# Patient Record
Sex: FEMALE | Race: WHITE | NOT HISPANIC OR LATINO | Employment: OTHER | URBAN - METROPOLITAN AREA
[De-identification: names, ages, dates, MRNs, and addresses within clinical notes are randomized per-mention and may not be internally consistent; named-entity substitution may affect disease eponyms.]

---

## 2017-11-24 ENCOUNTER — HOSPITAL ENCOUNTER (EMERGENCY)
Facility: HOSPITAL | Age: 37
End: 2017-11-24
Attending: EMERGENCY MEDICINE | Admitting: EMERGENCY MEDICINE
Payer: MEDICARE

## 2017-11-24 ENCOUNTER — HOSPITAL ENCOUNTER (INPATIENT)
Facility: HOSPITAL | Age: 37
LOS: 21 days | Discharge: HOME | DRG: 885 | End: 2017-12-15
Attending: PSYCHIATRY & NEUROLOGY | Admitting: PSYCHIATRY & NEUROLOGY
Payer: MEDICARE

## 2017-11-24 VITALS
SYSTOLIC BLOOD PRESSURE: 153 MMHG | HEART RATE: 101 BPM | DIASTOLIC BLOOD PRESSURE: 81 MMHG | OXYGEN SATURATION: 100 % | TEMPERATURE: 98.2 F | RESPIRATION RATE: 16 BRPM

## 2017-11-24 DIAGNOSIS — F41.9 ANXIETY: ICD-10-CM

## 2017-11-24 DIAGNOSIS — L98.9 DERMATOSIS: ICD-10-CM

## 2017-11-24 DIAGNOSIS — Z72.0 TOBACCO USE: ICD-10-CM

## 2017-11-24 DIAGNOSIS — F31.64 BIPOLAR I DISORDER, MOST RECENT EPISODE MIXED, SEVERE WITH PSYCHOTIC FEATURES (HCC): Primary | Chronic | ICD-10-CM

## 2017-11-24 DIAGNOSIS — E63.9 NUTRITIONAL DEFICIENCY: ICD-10-CM

## 2017-11-24 DIAGNOSIS — N39.0 UTI (URINARY TRACT INFECTION): ICD-10-CM

## 2017-11-24 DIAGNOSIS — K59.00 CONSTIPATION: ICD-10-CM

## 2017-11-24 DIAGNOSIS — R44.0 AUDITORY HALLUCINATIONS: ICD-10-CM

## 2017-11-24 DIAGNOSIS — F12.10 CANNABIS ABUSE: Chronic | ICD-10-CM

## 2017-11-24 DIAGNOSIS — R45.851 SUICIDAL IDEATIONS: Primary | ICD-10-CM

## 2017-11-24 DIAGNOSIS — L60.3: ICD-10-CM

## 2017-11-24 DIAGNOSIS — L85.3 XEROSIS OF SKIN: ICD-10-CM

## 2017-11-24 LAB — ETHANOL EXG-MCNC: 0 MG/DL

## 2017-11-24 PROCEDURE — 99284 EMERGENCY DEPT VISIT MOD MDM: CPT

## 2017-11-24 PROCEDURE — 82075 ASSAY OF BREATH ETHANOL: CPT | Performed by: EMERGENCY MEDICINE

## 2017-11-24 RX ORDER — HALOPERIDOL 5 MG/ML
10 INJECTION INTRAMUSCULAR ONCE
Status: DISCONTINUED | OUTPATIENT
Start: 2017-11-24 | End: 2017-11-24

## 2017-11-24 RX ORDER — HALOPERIDOL 5 MG/ML
5 INJECTION INTRAMUSCULAR EVERY 6 HOURS PRN
Status: CANCELLED | OUTPATIENT
Start: 2017-11-24

## 2017-11-24 RX ORDER — CARBAMAZEPINE 100 MG/5ML
SUSPENSION ORAL 4 TIMES DAILY
COMMUNITY
End: 2017-12-15 | Stop reason: HOSPADM

## 2017-11-24 RX ORDER — HALOPERIDOL 5 MG
5 TABLET ORAL EVERY 6 HOURS PRN
Status: CANCELLED | OUTPATIENT
Start: 2017-11-24

## 2017-11-24 RX ORDER — MAGNESIUM HYDROXIDE/ALUMINUM HYDROXICE/SIMETHICONE 120; 1200; 1200 MG/30ML; MG/30ML; MG/30ML
30 SUSPENSION ORAL EVERY 4 HOURS PRN
Status: CANCELLED | OUTPATIENT
Start: 2017-11-24

## 2017-11-24 RX ORDER — LORAZEPAM 1 MG/1
1 TABLET ORAL ONCE
Status: COMPLETED | OUTPATIENT
Start: 2017-11-24 | End: 2017-11-24

## 2017-11-24 RX ORDER — BENZTROPINE MESYLATE 1 MG/ML
1 INJECTION INTRAMUSCULAR; INTRAVENOUS
Status: CANCELLED | OUTPATIENT
Start: 2017-11-24

## 2017-11-24 RX ORDER — LORAZEPAM 2 MG/ML
2 INJECTION INTRAMUSCULAR ONCE
Status: DISCONTINUED | OUTPATIENT
Start: 2017-11-24 | End: 2017-11-24

## 2017-11-24 RX ORDER — OLANZAPINE 10 MG/1
10 TABLET, ORALLY DISINTEGRATING ORAL ONCE
Status: COMPLETED | OUTPATIENT
Start: 2017-11-24 | End: 2017-11-24

## 2017-11-24 RX ORDER — ACETAMINOPHEN 325 MG/1
650 TABLET ORAL EVERY 6 HOURS PRN
Status: CANCELLED | OUTPATIENT
Start: 2017-11-24

## 2017-11-24 RX ADMIN — LORAZEPAM 1 MG: 1 TABLET ORAL at 21:34

## 2017-11-24 RX ADMIN — OLANZAPINE 10 MG: 10 TABLET, ORALLY DISINTEGRATING ORAL at 21:34

## 2017-11-24 NOTE — ED NOTES
Pt was observed to attempt to leave the [address] [place] [address] area  She then walked into the corner of another empty room where she remained staring at the wall  She was approached and asked to remain in her room unless she needed to discuss something specific  Pt remains upset about not being able to urinate since she believes she does not have genitals      GINNY Quintana  11/24/17   3500
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
## 2017-11-24 NOTE — ED NOTES
Pt freely admits to having both SI and HI  Pt states she wishes her grandmother were dead and want to overdose her with medications  Pt states she already gives her grandmother pills at night to make her go to sleep so she doesn't have to deal with her  Pt also states that she has SI and has thougths of dissappearing, but walking off and "never to be found" also by overdosing on mouth wash       Thanh Avery RN  11/24/17 1557

## 2017-11-24 NOTE — ED NOTES
Received report from [facility], continual observation to be continued  Pt has been pacing around the [facility] unit and is now staring out the [facility] doors        Hetal Alvarado  11/24/17 9041

## 2017-11-24 NOTE — ED NOTES
CW spoke to patient in room  Patient seated on bed  Patient was cooperative  Patient requested water from [email or web address]  Patient was delivered water by SHAILA  CATALINO [facility]  CW exited [facility]  Will continue to monitor       Brittni Castro  11/24/17 9510

## 2017-11-24 NOTE — ED NOTES
Pt took sheet off of bed and is switching between sitting in the chair and laying in bed       Ramon Alcala  11/24/17 1846

## 2017-11-24 NOTE — ED NOTES
Patient given dietary tray  Tray is finger food diet only  Patient is seated on bed eating food  Will continue to monitor       Clarence Lozano  11/24/17 1625

## 2017-11-24 NOTE — LETTER
[address] [facility] [address]  [facility] [address]  Dept: [phone]                EMTALA TRANSFER CONSENT    NAME Cordelia Celestin                                1980                              MRN [id number]    I have been informed of my rights regarding examination, treatment, and transfer   by Dr Henrietta Chapin MD    Benefits: Other benefits (Include comment)_______________________ (inpt MH tx)    Risks: Potential deterioration of medical condition      Consent for Transfer:  I acknowledge that my medical condition has been evaluated and explained to me by the emergency department physician or other qualified medical person and/or my attending physician, who has recommended that I be transferred to the service of  Accepting Physician: Dr Adama Gusman at [address] [place] [address] Name, Clem[address]fedward [address] : Jose Juan Gibbs [phone]  The above potential benefits of such transfer, the potential risks associated with such transfer, and the probable risks of not being transferred have been explained to me, and I fully understand them  The doctor has explained that, in my case, the benefits of transfer outweigh the risks  I agree to be transferred  I authorize the performance of emergency medical procedures and treatments upon me in both transit and upon arrival at the receiving facility  Additionally, I authorize the release of any and all medical records to the receiving facility and request they be transported with me, if possible  I understand that the safest mode of transportation during a medical emergency is an ambulance and that the Hospital advocates the use of this mode of transport  Risks of traveling to the receiving facility by car, including absence of medical control, life sustaining equipment, such as oxygen, and medical personnel has been explained to me and I fully understand them      (ANNE CORRECT BOX BELOW)  [  Cecile Benites  I consent to the stated transfer and to be transported by ambulance/helicopter  [  ]  I consent to the stated transfer, but refuse transportation by ambulance and accept full responsibility for my transportation by car  I understand the risks of non-ambulance transfers and I exonerate the Hospital and its staff from any deterioration in my condition that results from this refusal     X___________________________________________    DATE  17  TIME________  Signature of patient or legally responsible individual signing on patient behalf           RELATIONSHIP TO PATIENT_________________________                          Provider Certification    NAME Kiet Elkins                                 1980                              MRN [id number]    A medical screening exam was performed on the above named patient  Based on the examination:    Condition Necessitating Transfer Suicidal Ideations, Psychosis     Patient Condition: The patient has been stabilized such that within reasonable medical probability, no material deterioration of the patient condition or the condition of the unborn child(hayden) is likely to result from the transfer    Reason for Transfer: Level of Care needed not available at this facility    Transfer Requirements: [facility] [address] [phone]   · Space available and qualified personnel available for treatment as acknowledged by Luma Linder  · Agreed to accept transfer and to provide appropriate medical treatment as acknowledged by       Dr Kp Marsh  · Appropriate medical records of the examination and treatment of the patient are provided at the time of transfer   [address] [facility], [address] __DC_____  · Transfer will be performed by qualified personnel from [facility] [phone]  and appropriate transfer equipment as required, including the use of necessary and appropriate life support measures      Provider Certification: I have examined the patient and explained the following risks and benefits of being transferred/refusing transfer to the patient/family:  General risk, such as traffic hazards, adverse weather conditions, rough terrain or turbulence, possible failure of equipment (including vehicle or aircraft), or consequences of actions of persons outside the control of the transport personnel      Based on these reasonable risks and benefits to the patient and/or the unborn child(hayden), and based upon the information available at the time of the patients examination, I certify that the medical benefits reasonably to be expected from the provision of appropriate medical treatments at another medical facility outweigh the increasing risks, if any, to the individuals medical condition, and in the case of labor to the unborn child, from effecting the transfer      X____________________________________________ DATE 11/24/17        TIME_______      ORIGINAL - SEND TO MEDICAL RECORDS   COPY - SEND WITH PATIENT DURING TRANSFER

## 2017-11-24 NOTE — ED NOTES
Spoke with Kristen Bishop at [facility], reports available beds  201 faxed over for review       Luma Linder, GINNY  11/24/17   9636

## 2017-11-24 NOTE — ED NOTES
Pt changed into paper scrubs per protocol  Pt's belongings inventoried and placed in bags in locker  Pt calm and cooperative  Constant watch observation initiated       Jett Herbert  11/24/17 5530

## 2017-11-24 NOTE — ED NOTES
Patient [facility] in [facility] at [facility] time  Full patient report given to Pallavi Love to continue to monitor        Woo Ritter  11/24/17 1545

## 2017-11-24 NOTE — ED NOTES
Patient pacing in [facility]  Dr Arianna Gill entered [facility] to talk to patient  Patient appears cooperative, but continues to pace  Dr Arianna Gill exits [facility]  Will continue to monitor        Kristal Herbert  11/24/17 1523

## 2017-11-24 NOTE — ED NOTES
Pt kept pacing around the unit after being told multiple times to stay in her room, but she had gone to the double doors and was staring out the window  She had pushed on the door and it had opened with no alarm going off, she was stopped at the doors before exiting the [address] [place] [address] unit  She was easily redirected back into the unit  Security was notified and the doors are now locked        Alli Charles  11/24/17 7231

## 2017-11-24 NOTE — ED NOTES
Registration entered secured holding three, patient seated and chair and cooperative  Registration exited  Will continue to monitor       [address] [facility]  11/24/17 4195

## 2017-11-24 NOTE — ED NOTES
Pt is laying in bed, eyes closed, respirations steady and unlabored  Will continue to monitor        Micaela Hernandez  11/24/17 8628

## 2017-11-24 NOTE — ED NOTES
Patient requested cup of water  Patient was given cup of water and then proceeded to pour down the sink and crush cup  Patient is now pacing in and out of doorway of room  Will continue to monitor       Jasmina Bell  11/24/17 6660

## 2017-11-25 ENCOUNTER — APPOINTMENT (INPATIENT)
Dept: RADIOLOGY | Facility: HOSPITAL | Age: 37
DRG: 885 | End: 2017-11-25
Payer: MEDICARE

## 2017-11-25 PROBLEM — F31.89 ATYPICAL BIPOLAR AFFECTIVE DISORDER (HCC): Status: ACTIVE | Noted: 2017-11-25

## 2017-11-25 LAB
ALBUMIN SERPL BCP-MCNC: 3.1 G/DL (ref 3.5–5)
ALP SERPL-CCNC: 59 U/L (ref 46–116)
ALT SERPL W P-5'-P-CCNC: 22 U/L (ref 12–78)
AMPHETAMINES SERPL QL SCN: NEGATIVE
ANION GAP SERPL CALCULATED.3IONS-SCNC: 5 MMOL/L (ref 4–13)
APAP SERPL-MCNC: <2 UG/ML (ref 10–30)
AST SERPL W P-5'-P-CCNC: 13 U/L (ref 5–45)
BARBITURATES UR QL: NEGATIVE
BASOPHILS # BLD AUTO: 0.01 THOUSANDS/ΜL (ref 0–0.1)
BASOPHILS NFR BLD AUTO: 0 % (ref 0–1)
BENZODIAZ UR QL: NEGATIVE
BILIRUB SERPL-MCNC: 0.33 MG/DL (ref 0.2–1)
BUN SERPL-MCNC: 9 MG/DL (ref 5–25)
CALCIUM SERPL-MCNC: 8.4 MG/DL (ref 8.3–10.1)
CHLORIDE SERPL-SCNC: 108 MMOL/L (ref 100–108)
CO2 SERPL-SCNC: 29 MMOL/L (ref 21–32)
COCAINE UR QL: NEGATIVE
CREAT SERPL-MCNC: 0.68 MG/DL (ref 0.6–1.3)
EOSINOPHIL # BLD AUTO: 0 THOUSAND/ΜL (ref 0–0.61)
EOSINOPHIL NFR BLD AUTO: 0 % (ref 0–6)
ERYTHROCYTE [DISTWIDTH] IN BLOOD BY AUTOMATED COUNT: 14.1 % (ref 11.6–15.1)
ETHANOL SERPL-MCNC: <3 MG/DL (ref 0–3)
GFR SERPL CREATININE-BSD FRML MDRD: 113 ML/MIN/1.73SQ M
GLUCOSE P FAST SERPL-MCNC: 99 MG/DL (ref 65–99)
GLUCOSE SERPL-MCNC: 96 MG/DL (ref 65–140)
GLUCOSE SERPL-MCNC: 99 MG/DL (ref 65–140)
HCT VFR BLD AUTO: 37.6 % (ref 34.8–46.1)
HGB BLD-MCNC: 12.3 G/DL (ref 11.5–15.4)
LYMPHOCYTES # BLD AUTO: 2.36 THOUSANDS/ΜL (ref 0.6–4.47)
LYMPHOCYTES NFR BLD AUTO: 23 % (ref 14–44)
MCH RBC QN AUTO: 29.7 PG (ref 26.8–34.3)
MCHC RBC AUTO-ENTMCNC: 32.7 G/DL (ref 31.4–37.4)
MCV RBC AUTO: 91 FL (ref 82–98)
METHADONE UR QL: NEGATIVE
MONOCYTES # BLD AUTO: 0.65 THOUSAND/ΜL (ref 0.17–1.22)
MONOCYTES NFR BLD AUTO: 6 % (ref 4–12)
NEUTROPHILS # BLD AUTO: 7.42 THOUSANDS/ΜL (ref 1.85–7.62)
NEUTS SEG NFR BLD AUTO: 71 % (ref 43–75)
NRBC BLD AUTO-RTO: 0 /100 WBCS
OPIATES UR QL SCN: NEGATIVE
PCP UR QL: NEGATIVE
PLATELET # BLD AUTO: 251 THOUSANDS/UL (ref 149–390)
PMV BLD AUTO: 10.7 FL (ref 8.9–12.7)
POTASSIUM SERPL-SCNC: 3.9 MMOL/L (ref 3.5–5.3)
PROT SERPL-MCNC: 6 G/DL (ref 6.4–8.2)
RBC # BLD AUTO: 4.14 MILLION/UL (ref 3.81–5.12)
SALICYLATES SERPL-MCNC: <3 MG/DL (ref 3–20)
SODIUM SERPL-SCNC: 142 MMOL/L (ref 136–145)
THC UR QL: NEGATIVE
WBC # BLD AUTO: 10.46 THOUSAND/UL (ref 4.31–10.16)

## 2017-11-25 PROCEDURE — 70450 CT HEAD/BRAIN W/O DYE: CPT

## 2017-11-25 PROCEDURE — 82948 REAGENT STRIP/BLOOD GLUCOSE: CPT

## 2017-11-25 PROCEDURE — 80329 ANALGESICS NON-OPIOID 1 OR 2: CPT | Performed by: EMERGENCY MEDICINE

## 2017-11-25 PROCEDURE — 80320 DRUG SCREEN QUANTALCOHOLS: CPT | Performed by: EMERGENCY MEDICINE

## 2017-11-25 PROCEDURE — 80307 DRUG TEST PRSMV CHEM ANLYZR: CPT | Performed by: EMERGENCY MEDICINE

## 2017-11-25 PROCEDURE — 85025 COMPLETE CBC W/AUTO DIFF WBC: CPT | Performed by: EMERGENCY MEDICINE

## 2017-11-25 PROCEDURE — 80053 COMPREHEN METABOLIC PANEL: CPT | Performed by: EMERGENCY MEDICINE

## 2017-11-25 RX ORDER — MAGNESIUM HYDROXIDE/ALUMINUM HYDROXICE/SIMETHICONE 120; 1200; 1200 MG/30ML; MG/30ML; MG/30ML
30 SUSPENSION ORAL EVERY 4 HOURS PRN
Status: DISCONTINUED | OUTPATIENT
Start: 2017-11-25 | End: 2017-12-15 | Stop reason: HOSPADM

## 2017-11-25 RX ORDER — NALOXONE HYDROCHLORIDE 1 MG/ML
INJECTION INTRAMUSCULAR; INTRAVENOUS; SUBCUTANEOUS
Status: DISCONTINUED
Start: 2017-11-25 | End: 2017-11-25 | Stop reason: WASHOUT

## 2017-11-25 RX ORDER — HALOPERIDOL 5 MG/ML
5 INJECTION INTRAMUSCULAR EVERY 6 HOURS PRN
Status: DISCONTINUED | OUTPATIENT
Start: 2017-11-25 | End: 2017-11-28

## 2017-11-25 RX ORDER — ACETAMINOPHEN 325 MG/1
650 TABLET ORAL EVERY 6 HOURS PRN
Status: DISCONTINUED | OUTPATIENT
Start: 2017-11-25 | End: 2017-12-05

## 2017-11-25 RX ORDER — NALOXONE HYDROCHLORIDE 1 MG/ML
1 INJECTION INTRAMUSCULAR; INTRAVENOUS; SUBCUTANEOUS ONCE
Status: COMPLETED | OUTPATIENT
Start: 2017-11-25 | End: 2017-11-25

## 2017-11-25 RX ORDER — NALOXONE HYDROCHLORIDE 1 MG/ML
INJECTION INTRAMUSCULAR; INTRAVENOUS; SUBCUTANEOUS
Status: COMPLETED
Start: 2017-11-25 | End: 2017-11-25

## 2017-11-25 RX ORDER — BENZTROPINE MESYLATE 1 MG/ML
1 INJECTION INTRAMUSCULAR; INTRAVENOUS
Status: DISCONTINUED | OUTPATIENT
Start: 2017-11-25 | End: 2017-11-28

## 2017-11-25 RX ORDER — HALOPERIDOL 5 MG
5 TABLET ORAL EVERY 6 HOURS PRN
Status: DISCONTINUED | OUTPATIENT
Start: 2017-11-25 | End: 2017-11-28

## 2017-11-25 RX ADMIN — NALOXONE HYDROCHLORIDE 1 MG: 1 INJECTION INTRAMUSCULAR; INTRAVENOUS; SUBCUTANEOUS at 01:08

## 2017-11-25 RX ADMIN — NALOXONE HYDROCHLORIDE 1 MG: 1 INJECTION PARENTERAL at 01:08

## 2017-11-25 NOTE — ED NOTES
[facility]TS arrived early, [place] at [facility] made aware of ETA       Agusto Quijano, GINNY  11/24/17   0704
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
## 2017-11-25 NOTE — ED NOTES
Pt came out of room stating that she drank the entire bottle of water already  She was asking for more water  Pt was given another cup of water       Tala Rabago  11/24/17 2014
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
## 2017-11-25 NOTE — CONSULTS
H&P Exam - Trauma   Rudi Arellano 40 y o  female MRN: [id number]  Unit/Bed#: FB8 670-14 Encounter: [id number]    Assessment/Plan   Trauma Alert: Inpatient trauma consultation after fall  Model of Arrival: Ambulance  Trauma Team: Attending Inocente Guzman and Nedra Dockery  Consultants: None    Trauma Active Problems:   1  Unwitnessed fall  2  Altered mental status  3  Adverse medication effect (sedated)    Trauma Plan:   1  Baseline labs/UDS/POC Glucose   2  Narcan  3  CT head  4  Continual observation  5  If negative CTH, reevaluate patient when clinically sober, hold sedating medications until results of tests and pt has returned to baseline  6  Fall precautions/Bed alarm    Chief Complaint: altered mental status      History of Present Illness   HPI:  Rudi Arellano is a 40 y o  female who is an inpatient behavioral health consult after unwitnessed fall  Per staff, pt was recently transferred from [facility] for SI/HI/psychosis, tech heard loud noise and found patient on the floor next to the bed with (+) urinary incontinence  According to notes from [facility] ED pt was a poor historian at previous facility, was given Zyprexa and Ativan prior to arrival  Based on outpatient records pt is normally a good historian, unsure if pt took elicit drugs prior to arrival to ED  Pt at time of examination, is somnolent, altered with a GCS of 11  Pt initially told staff she did not strike her head but at time of examination stated she did  Pt denied any pain at time of exam, scattered bruising noted to all extremities, no spinal tenderness, chest/abdominal tenderness  History limited secondary to altered state      Mechanism:Fall      Past Medical History:   Diagnosis Date    Bipolar I disorder, most recent episode depressed, moderate (HCC)    Depressive disorder, not elsewhere classified     Past Surgical History:   plastic surgery on ear, earring came out   D and E   foot, skin graft     Family History   Problem Relation Age of Onset    Hypertension Mother    Heart Disorder Mother   heart murmur    Cancer Paternal Grandmother   skin     Social History    Marital status: Single   Spouse name: N/A    Number of children: 0    Years of education: 12+   Occupational History    [employer]   Social History Main Topics    Smoking status: Former Smoker   Packs/day: 0 40   Years: 2 00   Types: Cigarettes, Cigars   Quit date: 9/14/2016    Smokeless tobacco: Never Used   Comment: pt quit about 2 days ago    Alcohol use No   Comment: socially    Drug use: No    Sexual activity: Yes   Partners: Male   Comment: multiple partners; unprotected sex while drunk     Review of patient's allergies indicates: Allergen Reactions    Allopurinol    Depakote [Valproic Acid And Related]    Risperdal [Risperidone]       Review of Systems   Unable to perform ROS: Mental status change   Genitourinary:        Urinary incontinence   Musculoskeletal: Positive for gait problem (gait instability)  Skin: Positive for wound (scattered bruising to all extremities)  Psychiatric/Behavioral: Positive for confusion and hallucinations  Historical Information   History is unobtainable from the patient due to sedation/AMS  Efforts to obtain history included the following sources: other medical personnel, obtained from other records    Past Medical History:   Diagnosis Date    Bipolar disorder ([facility] [address] )     Depression      No past surgical history on file  Social History   History   Alcohol use Not on file     History   Drug Use    Frequency: 1 0 time per week    Types: Methamphetamines     History   Smoking Status    Unknown If Ever Smoked   Smokeless Tobacco    Not on file       There is no immunization history on file for this patient    Last Tetanus: unknown  Family History: Non-contributory      Meds/Allergies   all current active meds have been reviewed    Allergies   Allergen Reactions    Lithium          PHYSICAL EXAM    PE limited by: patient cooperation    Objective   Vitals:   First set: Temperature: 97 8 °F (36 6 °C) (11/24/17 2249)  Pulse: 104 (11/24/17 2249)  Respirations: 16 (11/24/17 2249)  Blood Pressure: 131/65 (11/24/17 2249)    Primary Survey:   (A) Airway: intact  (B) Breathing: BBS CTA, no W/R/R  (C) Circulation: Pulses:   pedal  2/4 and radial  2/4  (D) Disabliity:  GCS Total:  11, Eye Opening: To pain = 2, Motor Response: Localizes pain = 5 and Verbal Response:  Confused = 4  (E) Expose:  Completed    Secondary Survey:   Physical Exam   Constitutional: She appears well-developed and well-nourished  Mild distress secondary to somnolent/altered mental status   HENT:   Head: Normocephalic and atraumatic  Nose: Nose normal    Mouth/Throat: Oropharynx is clear and moist  No oropharyngeal exudate  Eyes: Conjunctivae and EOM are normal  Pupils are equal, round, and reactive to light  Right eye exhibits no discharge  Left eye exhibits no discharge  Neck: Normal range of motion  Neck supple  No JVD present  No tracheal deviation present  No c-spine tenderness   Cardiovascular: Regular rhythm, normal heart sounds and intact distal pulses  Exam reveals no gallop and no friction rub  No murmur heard  tachycardic   Pulmonary/Chest: Effort normal and breath sounds normal  No stridor  No respiratory distress  She has no wheezes  She has no rales  She exhibits no tenderness  Abdominal: Soft  Bowel sounds are normal  She exhibits no distension  There is no tenderness  There is no rebound and no guarding  Genitourinary:   Genitourinary Comments: No genital trauma noted   Musculoskeletal: Normal range of motion  She exhibits no edema, tenderness or deformity  Neurological:   Drowsy, GCS 11, pt falling asleep during exam (difficulty with answering questions or following commands)   Skin: She is not diaphoretic  Scattered ecchymosis to extremities   Nursing note and vitals reviewed        Invasive Devices          No matching active lines, drains, or airways          Lab Results:   BMP/CMP:   Lab Results   Component Value Date     11/25/2017    K 3 9 11/25/2017     11/25/2017    CO2 29 11/25/2017    ANIONGAP 5 11/25/2017    BUN 9 11/25/2017    CREATININE 0 68 11/25/2017    GLUCOSE 99 11/25/2017    CALCIUM 8 4 11/25/2017    AST 13 11/25/2017    ALT 22 11/25/2017    ALKPHOS 59 11/25/2017    PROT 6 0 (L) 11/25/2017    ALBUMIN 3 1 (L) 11/25/2017    BILITOT 0 33 11/25/2017    EGFR 113 11/25/2017   , CBC:   Lab Results   Component Value Date    WBC 10 46 (H) 11/25/2017    HGB 12 3 11/25/2017    HCT 37 6 11/25/2017    MCV 91 11/25/2017     11/25/2017    MCH 29 7 11/25/2017    MCHC 32 7 11/25/2017    RDW 14 1 11/25/2017    MPV 10 7 11/25/2017    NRBC 0 11/25/2017   , AST:   Lab Results   Component Value Date    AST 13 11/25/2017   , ALT:   Lab Results   Component Value Date    ALT 22 11/25/2017   UDS: pending  Imaging/EKG Studies:   11/25/17 CT head: No acute intracranial pathology        Code Status: No Order  Advance Directive and Living Will:      Power of :    POLST:

## 2017-11-25 NOTE — ED NOTES
1 to 1 taken over  Pt appears to be asleep on her bed  No signs of distress  Will continue to monitor       Armando Rabago  11/24/17 9172

## 2017-11-25 NOTE — PLAN OF CARE
Alteration in Thoughts and Perception     Treatment Goal: Gain control of psychotic behaviors/thinking, reduce/eliminate presenting symptoms and demonstrate improved reality functioning upon discharge Progressing     Verbalize thoughts and feelings Progressing     Refrain from acting on delusional thinking/internal stimuli Progressing     Agree to be compliant with medication regime, as prescribed and report medication side effects Progressing     Recognize dysfunctional thoughts, communicate reality-based thoughts at the time of discharge Progressing     Complete daily ADLs, including personal hygiene independently, as able Progressing

## 2017-11-25 NOTE — ED NOTES
Pt allowed me to obtain her vitals  After I walked out she stepped out of her room, placed her empty urine cup on the counter near the sink and grabbed her food from her tray that was sitting next to the sink  Pt is now standing in her doorway staring out  Will continue to monitor       Bre Sharifa Rabago  11/24/17 9630

## 2017-11-25 NOTE — ED NOTES
Call placed to [facility] to check on status of COB request, spoke with Jc Faye who stated Austin Sherman will not be placed on same day; due to weekend return call will most likely be received on Monday 11/27       GINNY Null  11/24/17   9591

## 2017-11-25 NOTE — PROGRESS NOTES
Patient demanding to be given "something" because "I was given Lithium and I'm allergic to it " "Take this band of me - pointing to her allergy band - it's too tight, I can't breathe " Patient breathing perfectly normally, plastic allergy band was not tight but was cut off anyway  Explained to patient she was given a small dose of Narcan at 1 08 am because her behavior was causing suspicion she may have used drugs before she got here, and was potentially the reason she was falling around and was disorganized  She was told she was not given any other drug and at this point was not going to receive any medication

## 2017-11-25 NOTE — PROGRESS NOTES
MHT that was doing rounding heard a "thump" from pt's room  Pt was noted to be on her knees and getting up from  The floor  Pt was incontinent of a large amount of urine  Pt was disorganized and sedated  Gait was unsteady  Staff assisted pt to bathroom to assist in ADLs

## 2017-11-25 NOTE — ED NOTES
Pt came out of room and asked what time breakfast was served  I advised her not until 7 am   Pt then asked what time it was, informed that it was approx  8 pm   Pt asked for snacks  Pt was provided a water and a bag lunch which included a sandwich, pretzels and animal crackers  Pt brought bag lunch to her bed and sat it on the mattress  Pt continues to stand and stare out the doorway of her room       Verona Rabago  11/24/17 2010

## 2017-11-25 NOTE — ED NOTES
Pt has been accepted to [facility] by Nurse Ebonie Boggs and Dr Montesinos Held      Report needs to be called to [facility] at [phone]

## 2017-11-25 NOTE — ED NOTES
Call placed to [facility], pt has [address] [place] [address] coverage as secondary insurance  Call placed to [address] [place] [address], spoke with Fabiano Garnett to begin COB  Awaiting call back  Call placed to [facility] spoke with Rosas Burnette who set up transport for 2300      GINNY Engle  11/24/17   1964
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
## 2017-11-25 NOTE — TREATMENT PLAN
TREATMENT PLAN REVIEW - [address] 40 y o  1980 female MRN: [id number]    [address] [facility] Room / Bed: [place] [address] Encounter: [id number]          Admit Date/Time:  11/24/2017 10:43 PM    Treatment Team: Attending Provider: Judit Chavez MD; Patient Care Technician: Sabas Keenan; Patient Care Technician: Roxann Nagy; Patient Care Technician: Julito Lazcano; Nursing Student: Agus Najera;  Registered Nurse: Zhane Mauricio RN; Patient Care Technician: Harjeet Mccrary    Diagnosis: Principal Problem:    Atypical bipolar affective disorder [facility])      Patient Strengths/Assets: cooperative, communication skills    Patient Barriers/Limitations: limited education, limited motivation, low self esteem, noncompliant with medication, noncompliant with treatment, poor insight, poor interpersonal skills    Short Term Goals: decrease in depressive symptoms, decrease in paranoid thoughts, decrease in psychotic symptoms, decrease in suicidal thoughts    Long Term Goals: free of suicidal thoughts, resolution of psychotic symptoms, improved insight    Progress Towards Goals: continue psychiatric medications as prescribed    Recommended Treatment: medication management, patient medication education, group therapy, milieu therapy, continued Behavioral Health psychiatric evaluation/assessment process    Treatment Frequency: daily medication monitoring, group and milieu therapy daily, monitoring through interdisciplinary rounds, monitoring through weekly patient care conferences    Expected Discharge Date:  12/05/17    Discharge Plan: discharge to home    Treatment Plan Created/Updated By: Aurelio Henriquez MD

## 2017-11-25 NOTE — ED NOTES
Call received from [id number],  of [facility]  [id number] inquired about pt's status; this worker apologized for not reaching out and explained pt denied current services  [id number] reports that pt gave birth a month ago, and prior to that was stable on medications  Pt had a recent psychiatry appointment, but is unsure the turn out of that and reports that pt may have lied about taking medications since she has been breast feeding  [address] confirmed pt lives with her parents, is in active treatment and has not been presenting the way described at this time  ACT is agreeable to pt's inpatient hospitalization at this time as she was informed of current psychosis  [id number] made aware of ETA and placement  Please update ACT as needed: [id number] [phone]       GINNY Guy  11/24/17 2037

## 2017-11-25 NOTE — ED NOTES
Crisis worker at bedside speaking with pt  Pt signing paperwork for transport       Bonnie Rabago  11/24/17 2033

## 2017-11-25 NOTE — PROGRESS NOTES
More alert,    Pt understands that she is at the hospital   She is orientated x 4  Gait is becoming more steady  Bed alarm is on

## 2017-11-25 NOTE — ED NOTES
Spoke with Kasie Clemons at [facility], pt accepted to [facility] by Dr Letitia Huitron      Nurse to Nurse Report to be completed [address] [facility]  11/24/17   1950

## 2017-11-25 NOTE — ED NOTES
Attempted to call report to [facility]   Spoke with Ron Reid who states she will call back     Silvino Carmona RN  11/24/17 7218
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
## 2017-11-25 NOTE — ED NOTES
Pt told security SHAILA Dougherty that she saw a rat running around the [facility]ILITY unit        Arnaud Arielle  11/24/17 1903

## 2017-11-25 NOTE — PROGRESS NOTES
Narcan 1 mg IM given at 01:10  Minimal effect of same  Pt is arousable with shaking of shoulder and loud verbal stimuli, but falls right back to sleep
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
## 2017-11-25 NOTE — ED NOTES
Pt was given a bucket with personal cleaning supplies in it  She was advised that she may go into the restroom to clean up if she would like  New scrubs were given to pt as well       Bre Rabago  11/24/17 5743

## 2017-11-26 RX ORDER — CARBAMAZEPINE 100 MG/1
100 TABLET, CHEWABLE ORAL 2 TIMES DAILY
Status: DISCONTINUED | OUTPATIENT
Start: 2017-11-26 | End: 2017-11-29

## 2017-11-26 RX ADMIN — CARBAMAZEPINE 100 MG: 100 TABLET, CHEWABLE ORAL at 12:23

## 2017-11-26 RX ADMIN — CARBAMAZEPINE 100 MG: 100 TABLET, CHEWABLE ORAL at 18:47

## 2017-11-26 NOTE — PROGRESS NOTES
Progress Note - Behavioral Health   Tatiana Triplett 40 y o  female MRN: [id number]  Unit/Bed#: RJ8 139-63 Encounter: [id number]    Assessment/Plan   Principal Problem:    Atypical bipolar affective disorder ([address] )      Behavior over the last 24 hours:  unchanged  Sleep: insomnia  Appetite: normal  Medication side effects: No  ROS: no complaints    Mental Status Evaluation:  Appearance:  disheveled   Behavior:  psychomotor agitation   Speech:  soft   Mood:  dysthymic   Affect:  labile   Thought Process:  disorganized   Thought Content:  delusions  persecutory   Perceptual Disturbances: None   Risk Potential: Suicidal Ideations without plan   Sensorium:  person and place   Cognition:  grossly intact   Consciousness:  awake    Attention: attention span and concentration were age appropriate   Insight:  limited   Judgment: limited   Gait/Station: normal gait/station   Motor Activity: no abnormal movements     Progress Toward Goals: Pt cont to be quite disorganized with her mood being labile and impulsive  Staff report that she slept poorly last night though pt denies this stating that she sometime sleep walks  Limiyed insght  Recommended Treatment:  1-Tegretol 100mg bid  2- Continue with group therapy, milieu therapy and occupational therapy  Risks, benefits and possible side effects of Medications:   Risks, benefits, and possible side effects of medications explained to patient and patient verbalizes understanding        Medications:   current meds:   Current Facility-Administered Medications   Medication Dose Route Frequency    acetaminophen (TYLENOL) tablet 650 mg  650 mg Oral Q6H PRN    aluminum-magnesium hydroxide-simethicone (MYLANTA) 200-200-20 mg/5 mL oral suspension 30 mL  30 mL Oral Q4H PRN    benztropine (COGENTIN) injection 1 mg  1 mg Intramuscular Q1H PRN    carBAMazepine (TEGretol) chewable tablet 100 mg  100 mg Oral BID    haloperidol (HALDOL) tablet 5 mg  5 mg Oral Q6H PRN    haloperidol lactate (HALDOL) injection 5 mg  5 mg Intramuscular Q6H PRN    magnesium hydroxide (MILK OF MAGNESIA) 400 mg/5 mL oral suspension 30 mL  30 mL Oral Daily PRN     Labs: I have reviewed pt's labs    Counseling / Coordination of Care  Total floor / unit time spent today 25 minutes  Greater than 50% of total time was spent with the patient and / or family counseling and / or coordination of care   A description of the counseling / coordination of care:

## 2017-11-26 NOTE — PLAN OF CARE
Problem: Alteration in Thoughts and Perception  Goal: Treatment Goal: Gain control of psychotic behaviors/thinking, reduce/eliminate presenting symptoms and demonstrate improved reality functioning upon discharge  Outcome: Progressing    Goal: Verbalize thoughts and feelings  Interventions:  - Promote a nonjudgmental and trusting relationship with the patient through active listening and therapeutic communication  - Assess patient's level of functioning, behavior and potential for risk  - Engage patient in 1 on 1 interactions for a minimum of 15 minutes each session  - Encourage patient to express fears, feelings, frustrations, and discuss symptoms    - [place] patient to reality, help patient recognize reality-based thinking   - Administer medications as ordered and assess for potential side effects  - Provide the patient education related to the signs and symptoms of the illness and desired effects of prescribed medications   Outcome: Progressing    Goal: Refrain from acting on delusional thinking/internal stimuli  Interventions:  - Monitor patient closely, per order   - Utilize least restrictive measures   - Set reasonable limits, give positive feedback for acceptable   - Administer medications as ordered and monitor of potential side effects   Outcome: Progressing    Goal: Agree to be compliant with medication regime, as prescribed and report medication side effects  Interventions:  - Offer appropriate PRN medication and supervise ingestion; conduct aims, as needed    Outcome: Progressing    Goal: Recognize dysfunctional thoughts, communicate reality-based thoughts at the time of discharge  Interventions:  - Provide medication and psycho-education to assist patient in compliance and developing insight into his/her illness    Outcome: Progressing    Goal: Complete daily ADLs, including personal hygiene independently, as able  Interventions:  - Observe, teach, and assist patient with ADLS  - Monitor and promote a balance of rest/activity, with adequate nutrition and elimination    Outcome: Progressing

## 2017-11-26 NOTE — PROGRESS NOTES
Pt denies all symptoms  She appears guarded and suspicious  Pt has an odd affect, seems disorganized during brief interaction with RN  Had an inappropriate smile  All she admits to is feeling depressed  Will monitor

## 2017-11-26 NOTE — PROGRESS NOTES
Patient has been quite disorganized this evening but less of the irritable edge  She appears to be internally stimulated but becomes quite guarded when she is asked about this

## 2017-11-27 LAB
ALBUMIN SERPL BCP-MCNC: 2.8 G/DL (ref 3.5–5)
ALP SERPL-CCNC: 55 U/L (ref 46–116)
ALT SERPL W P-5'-P-CCNC: 22 U/L (ref 12–78)
ANION GAP SERPL CALCULATED.3IONS-SCNC: 6 MMOL/L (ref 4–13)
AST SERPL W P-5'-P-CCNC: 14 U/L (ref 5–45)
BASOPHILS # BLD AUTO: 0.01 THOUSANDS/ΜL (ref 0–0.1)
BASOPHILS NFR BLD AUTO: 0 % (ref 0–1)
BILIRUB SERPL-MCNC: 0.31 MG/DL (ref 0.2–1)
BUN SERPL-MCNC: 8 MG/DL (ref 5–25)
CALCIUM SERPL-MCNC: 8.5 MG/DL (ref 8.3–10.1)
CHLORIDE SERPL-SCNC: 108 MMOL/L (ref 100–108)
CHOLEST SERPL-MCNC: 146 MG/DL (ref 50–200)
CO2 SERPL-SCNC: 28 MMOL/L (ref 21–32)
CREAT SERPL-MCNC: 0.7 MG/DL (ref 0.6–1.3)
EOSINOPHIL # BLD AUTO: 0 THOUSAND/ΜL (ref 0–0.61)
EOSINOPHIL NFR BLD AUTO: 0 % (ref 0–6)
ERYTHROCYTE [DISTWIDTH] IN BLOOD BY AUTOMATED COUNT: 14 % (ref 11.6–15.1)
FOLATE SERPL-MCNC: 12.4 NG/ML (ref 3.1–17.5)
GFR SERPL CREATININE-BSD FRML MDRD: 112 ML/MIN/1.73SQ M
GLUCOSE P FAST SERPL-MCNC: 89 MG/DL (ref 65–99)
GLUCOSE SERPL-MCNC: 89 MG/DL (ref 65–140)
HCG SERPL QL: NEGATIVE
HCT VFR BLD AUTO: 36.8 % (ref 34.8–46.1)
HDLC SERPL-MCNC: 52 MG/DL (ref 40–60)
HGB BLD-MCNC: 11.9 G/DL (ref 11.5–15.4)
LDLC SERPL CALC-MCNC: 78 MG/DL (ref 0–100)
LYMPHOCYTES # BLD AUTO: 1.73 THOUSANDS/ΜL (ref 0.6–4.47)
LYMPHOCYTES NFR BLD AUTO: 24 % (ref 14–44)
MCH RBC QN AUTO: 29.2 PG (ref 26.8–34.3)
MCHC RBC AUTO-ENTMCNC: 32.3 G/DL (ref 31.4–37.4)
MCV RBC AUTO: 90 FL (ref 82–98)
MONOCYTES # BLD AUTO: 0.69 THOUSAND/ΜL (ref 0.17–1.22)
MONOCYTES NFR BLD AUTO: 9 % (ref 4–12)
NEUTROPHILS # BLD AUTO: 4.89 THOUSANDS/ΜL (ref 1.85–7.62)
NEUTS SEG NFR BLD AUTO: 67 % (ref 43–75)
NRBC BLD AUTO-RTO: 0 /100 WBCS
PLATELET # BLD AUTO: 242 THOUSANDS/UL (ref 149–390)
PMV BLD AUTO: 10.6 FL (ref 8.9–12.7)
POTASSIUM SERPL-SCNC: 4.1 MMOL/L (ref 3.5–5.3)
PROT SERPL-MCNC: 6.3 G/DL (ref 6.4–8.2)
RBC # BLD AUTO: 4.07 MILLION/UL (ref 3.81–5.12)
SODIUM SERPL-SCNC: 142 MMOL/L (ref 136–145)
T3 SERPL-MCNC: 1 NG/ML (ref 0.6–1.8)
TRIGL SERPL-MCNC: 80 MG/DL
TSH SERPL DL<=0.05 MIU/L-ACNC: 0.68 UIU/ML (ref 0.36–3.74)
VIT B12 SERPL-MCNC: 320 PG/ML (ref 100–900)
WBC # BLD AUTO: 7.33 THOUSAND/UL (ref 4.31–10.16)

## 2017-11-27 PROCEDURE — 84480 ASSAY TRIIODOTHYRONINE (T3): CPT | Performed by: PSYCHIATRY & NEUROLOGY

## 2017-11-27 PROCEDURE — 82746 ASSAY OF FOLIC ACID SERUM: CPT | Performed by: PSYCHIATRY & NEUROLOGY

## 2017-11-27 PROCEDURE — 80061 LIPID PANEL: CPT | Performed by: PSYCHIATRY & NEUROLOGY

## 2017-11-27 PROCEDURE — 84443 ASSAY THYROID STIM HORMONE: CPT | Performed by: PSYCHIATRY & NEUROLOGY

## 2017-11-27 PROCEDURE — 84703 CHORIONIC GONADOTROPIN ASSAY: CPT | Performed by: PSYCHIATRY & NEUROLOGY

## 2017-11-27 PROCEDURE — 85025 COMPLETE CBC W/AUTO DIFF WBC: CPT | Performed by: PSYCHIATRY & NEUROLOGY

## 2017-11-27 PROCEDURE — 80053 COMPREHEN METABOLIC PANEL: CPT | Performed by: PSYCHIATRY & NEUROLOGY

## 2017-11-27 PROCEDURE — 82607 VITAMIN B-12: CPT | Performed by: PSYCHIATRY & NEUROLOGY

## 2017-11-27 RX ORDER — QUETIAPINE FUMARATE 25 MG/1
50 TABLET, FILM COATED ORAL
Status: DISCONTINUED | OUTPATIENT
Start: 2017-11-27 | End: 2017-11-28

## 2017-11-27 RX ADMIN — ALUMINUM HYDROXIDE, MAGNESIUM HYDROXIDE, AND SIMETHICONE 30 ML: 200; 200; 20 SUSPENSION ORAL at 23:59

## 2017-11-27 RX ADMIN — CARBAMAZEPINE 100 MG: 100 TABLET, CHEWABLE ORAL at 08:42

## 2017-11-27 RX ADMIN — CARBAMAZEPINE 100 MG: 100 TABLET, CHEWABLE ORAL at 17:53

## 2017-11-27 RX ADMIN — QUETIAPINE FUMARATE 50 MG: 25 TABLET ORAL at 22:02

## 2017-11-27 RX ADMIN — ACETAMINOPHEN 650 MG: 325 TABLET, FILM COATED ORAL at 14:33

## 2017-11-27 NOTE — PROGRESS NOTES
Patient is cooperative and calm, medication compliant without additional encouragement needed  Pt advised that she is feeling better today d/t the fact that she finally had a good night sleep  Pt has been present in the milieu, no agitation or bizarre behavior noted  Denies s/s  Will continue to monitor

## 2017-11-27 NOTE — PLAN OF CARE
Problem: Ineffective Coping  Goal: Participates in unit activities  Interventions:  - Provide therapeutic environment   - Provide required programming   - Redirect inappropriate behaviors   Outcome: Progressing

## 2017-11-27 NOTE — PROGRESS NOTES
When asked how she is today she said "I'm less manic " She is better related, interaction more appropriate  She had no ambivalence about taking her medications this evening and she is aware she will get Seroquel at [facility]

## 2017-11-27 NOTE — ED NOTES
Spoke with Pippa from [facility] regarding [address] for placement

## 2017-11-27 NOTE — PROGRESS NOTES
Progress Note - [address] 40 y o  female MRN: @MRN   Unit/Bed#: RM6 736-58 Encounter: [id number]    Patient has demonstrated odd behavior, disorganization, irritability  Maeve Suh reports today that she is feeling okay  She says that she is tired but doing all right with medications  She says that she slept last night  Admits to feeling a little bit sad but not very depressed, anxiety is present  He is not sure why she is here and only had vague recollection of the details of the event prior to her hospitalization  She admits to trying that overdose years ago on mouthwash and pills, but denies any current issues  She denies any suicidal or homicidal ideation but also denies ever stating that she was suicidal or homicidal, denies any auditory or visual hallucinations, denies having an ACT team, denies making statements that have been reported in prior notes, and minimizes events and symptoms  She seemed very guarded and unable to articulate clearly an understanding of the events that preceded this hospitalization as well as events that happened here at the hospital      She feels comfortable taking the Tegretol and at 1st stated that she does not plan to continue to breast feed  She then later indicated that she was hoping to do so  We talked about medication options including increasing Tegretol, or addition of a 1st or 2nd generation antipsychotic  We talked about a number of different medication options, and she was most interested in Seroquel  She understands that seroquel does infiltrative breast milk although less so than many other medications  She understands also however that it is still a potential risk to her infant and  I still advise that she stop breastfeeding  She has done well with seroquel in the past      Sleep: normal  Appetite: normal  Medication side effects: No except feeling tired     ROS: tired    Mental Status Evaluation:    Appearance:  age appropriate   Behavior:  Pleasant & cooperative, guarded   Speech:  regular rate and rhythm   Mood:  dysphoric, anxious   Affect:  blunted       Thought Process:  goal directed, concrete   Associations: intact associations   Thought Content:  appears paranoid/guarded but denies symptoms   Perceptual Disturbances: Stated [employer] AH and VH at admission, but she denies any IOR, [employer], thought insertion, AH or VH  She also denies ever having this recently   Risk Potential: Suicidal ideation - she denies, but also states she has not had SI for a long time although notes indicate she admitted to [facility] while in hospital  Homicidal ideation - as above  Potential for aggression - No   Sensorium:  A&Ox3   Cognition:  grossly intact   Consciousness:  alert and awake   Memory:  recent memory moderately impaired, remote memory mildly impaired   Attention: attention span and concentration appear shorter than expected for age   Intellect: within normal limits       Insight:  impaired   Judgment: limited         Gait/Station: normal gait/station with good balance   Motor Activity: no abnormal movements     Vital signs in last 24 hours:    Temp:  [97 9 °F (36 6 °C)] 97 9 °F (36 6 °C)  HR:  [] 92  Resp:  [16] 16  BP: (112-121)/(64-66) 112/64    Laboratory results:  I have personally reviewed all pertinent laboratory/tests results  Progress Toward Goals:  slow improvement  Assessment/Plan   Principal Problem:    Atypical bipolar affective disorder (HCC)      Kisha Gold is demonstrating symptoms that make me strongly suspicious for psychotic symptoms  She is very guarded and withdrawn  She has the longstanding history of being stable psychiatrically per her ACT team representative Karel Lundberg with whom I talked today  I tried to reach her psychiatrist   They are not in the office today  I considered maintaining the Tegretol at the current dose or increasing that dose    She states that she was compliant with this medication although I a m  suspicious that she was not  Unfortunately we do not have Tegretol levels from admission to verify  She is on a very low dose and had reported issues with this at higher doses in the past   She indicates that Seroquel has been a very favorable medication and she is interested in this medication  I did weigh the risks and benefits with breast feeding and other medication considerations  I shared with her metabolic syndrome risks, TD, EPS and NMS  We talked about considerations the medication directions but she was most interested in Seroquel as she has been on this medication in the past and feels that it has helped her  Recommended Treatment:     Planned medication and treatment changes: All current active medications have been reviewed  carBAMazepine 100 mg Oral BID   QUEtiapine 50 mg Oral HS       1) Start seroquel 50mg HS  PARQ discussed  Continue to consider options in moving forward in treatment (pt wants to breastfeed)  2) continue tegretol 100mg BID  3) Attempted to reach Dr Jacquie Randolph [phone] (closed Monday, will open Tuesday)  4) Discussed pt with , Yadira Contreras [phone]  She was apparently stable for ~2yr prior to this event  Child born 9/18/2017  Medications adjusted recently by psychiatrist   5) Majorica, ACT CM would like us to contact her when Leticia Soto is less guarded/more engageable  She would like to visit patient  6) Continue to support patient and engage them in the programs available as possible and appropriate  Continue case management support and therapy  Continue discharge planning  Risks / Benefits of Treatment:    Risks, benefits, and possible side effects of medications explained to patient and patient verbalizes understanding and agreement for treatment  Counseling / Coordination of Care:    Patient's progress discussed with staff in treatment team meeting    Medications, treatment progress and treatment plan reviewed with patient        Marlon Park III, DO  11/27/2017  3:33 PM
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
## 2017-11-27 NOTE — PROGRESS NOTES
Initially resistant to taking 2nd dose of Tegretol this evening but after multiple explanations about name of drug, when she is supposed to take it, whether or not she had an allergy - she eventually agreed to take it  Approaching [facility] with a very demanding, bullying attitude  Redirection needed    Patient has told her peers staff are  Calling her by the wrong name - her real name is Andressa Sotomayor

## 2017-11-28 PROBLEM — F31.64 BIPOLAR I DISORDER, MOST RECENT EPISODE MIXED, SEVERE WITH PSYCHOTIC FEATURES (HCC): Chronic | Status: ACTIVE | Noted: 2017-11-25

## 2017-11-28 LAB
BACTERIA UR QL AUTO: ABNORMAL /HPF
BILIRUB UR QL STRIP: NEGATIVE
CLARITY UR: ABNORMAL
COLOR UR: YELLOW
GLUCOSE UR STRIP-MCNC: NEGATIVE MG/DL
HGB UR QL STRIP.AUTO: NEGATIVE
HYALINE CASTS #/AREA URNS LPF: ABNORMAL /LPF
KETONES UR STRIP-MCNC: NEGATIVE MG/DL
LEUKOCYTE ESTERASE UR QL STRIP: ABNORMAL
NITRITE UR QL STRIP: NEGATIVE
NON-SQ EPI CELLS URNS QL MICRO: ABNORMAL /HPF
PH UR STRIP.AUTO: 8 [PH] (ref 4.5–8)
PROT UR STRIP-MCNC: NEGATIVE MG/DL
RBC #/AREA URNS AUTO: ABNORMAL /HPF
SP GR UR STRIP.AUTO: 1.01 (ref 1–1.03)
UROBILINOGEN UR QL STRIP.AUTO: 1 E.U./DL
WBC #/AREA URNS AUTO: ABNORMAL /HPF

## 2017-11-28 PROCEDURE — 87086 URINE CULTURE/COLONY COUNT: CPT | Performed by: PSYCHIATRY & NEUROLOGY

## 2017-11-28 PROCEDURE — 81001 URINALYSIS AUTO W/SCOPE: CPT | Performed by: PSYCHIATRY & NEUROLOGY

## 2017-11-28 RX ORDER — BETAMETHASONE DIPROPIONATE 0.05 %
OINTMENT (GRAM) TOPICAL 2 TIMES DAILY PRN
Status: DISCONTINUED | OUTPATIENT
Start: 2017-11-28 | End: 2017-11-29

## 2017-11-28 RX ORDER — ARIPIPRAZOLE 15 MG/1
15 TABLET ORAL
COMMUNITY
Start: 2015-12-05 | End: 2017-12-15 | Stop reason: HOSPADM

## 2017-11-28 RX ORDER — LANOLIN ALCOHOL/MO/W.PET/CERES
3 CREAM (GRAM) TOPICAL
Status: DISCONTINUED | OUTPATIENT
Start: 2017-11-28 | End: 2017-11-29 | Stop reason: SDUPTHER

## 2017-11-28 RX ORDER — HALOPERIDOL 5 MG
5 TABLET ORAL EVERY 6 HOURS PRN
Status: DISCONTINUED | OUTPATIENT
Start: 2017-11-28 | End: 2017-12-15 | Stop reason: HOSPADM

## 2017-11-28 RX ORDER — NICOTINE 21 MG/24HR
21 PATCH, TRANSDERMAL 24 HOURS TRANSDERMAL DAILY
Status: DISCONTINUED | OUTPATIENT
Start: 2017-11-28 | End: 2017-12-15 | Stop reason: HOSPADM

## 2017-11-28 RX ORDER — OLANZAPINE 10 MG/1
10 INJECTION, POWDER, LYOPHILIZED, FOR SOLUTION INTRAMUSCULAR
Status: DISCONTINUED | OUTPATIENT
Start: 2017-11-28 | End: 2017-12-15 | Stop reason: HOSPADM

## 2017-11-28 RX ORDER — TRAZODONE HYDROCHLORIDE 50 MG/1
50 TABLET ORAL
Status: DISCONTINUED | OUTPATIENT
Start: 2017-11-28 | End: 2017-11-29

## 2017-11-28 RX ORDER — BENZTROPINE MESYLATE 1 MG/1
1 TABLET ORAL EVERY 6 HOURS PRN
Status: DISCONTINUED | OUTPATIENT
Start: 2017-11-28 | End: 2017-12-15 | Stop reason: HOSPADM

## 2017-11-28 RX ORDER — OLANZAPINE 10 MG/1
10 TABLET, ORALLY DISINTEGRATING ORAL
Status: DISCONTINUED | OUTPATIENT
Start: 2017-11-28 | End: 2017-12-15 | Stop reason: HOSPADM

## 2017-11-28 RX ORDER — BETAMETHASONE DIPROPIONATE 0.05 %
OINTMENT (GRAM) TOPICAL
COMMUNITY
Start: 2017-10-05 | End: 2017-12-15 | Stop reason: HOSPADM

## 2017-11-28 RX ORDER — HALOPERIDOL 5 MG/ML
5 INJECTION INTRAMUSCULAR EVERY 6 HOURS PRN
Status: DISCONTINUED | OUTPATIENT
Start: 2017-11-28 | End: 2017-12-15 | Stop reason: HOSPADM

## 2017-11-28 RX ORDER — BENZTROPINE MESYLATE 1 MG/ML
1 INJECTION INTRAMUSCULAR; INTRAVENOUS EVERY 6 HOURS PRN
Status: DISCONTINUED | OUTPATIENT
Start: 2017-11-28 | End: 2017-12-15 | Stop reason: HOSPADM

## 2017-11-28 RX ADMIN — NICOTINE 21 MG: 21 PATCH, EXTENDED RELEASE TRANSDERMAL at 10:04

## 2017-11-28 RX ADMIN — QUETIAPINE FUMARATE 150 MG: 100 TABLET ORAL at 21:30

## 2017-11-28 RX ADMIN — MELATONIN TAB 3 MG 3 MG: 3 TAB at 21:30

## 2017-11-28 RX ADMIN — ACETAMINOPHEN 650 MG: 325 TABLET, FILM COATED ORAL at 03:52

## 2017-11-28 RX ADMIN — CARBAMAZEPINE 100 MG: 100 TABLET, CHEWABLE ORAL at 08:20

## 2017-11-28 RX ADMIN — CARBAMAZEPINE 100 MG: 100 TABLET, CHEWABLE ORAL at 18:11

## 2017-11-28 NOTE — PROGRESS NOTES
Pt given Tylenol PRN for headache 6/10 pain @0355  Medication effective, pt states her pain improved to 3/10 which is an acceptable level

## 2017-11-28 NOTE — PROGRESS NOTES
Pt was complaining of indigestion and was given Mylanta  Pt stated one hour later that she has no indigestion

## 2017-11-28 NOTE — PROGRESS NOTES
Progress Note - [address] 40 y o  female MRN: [id number]   Unit/Bed#: YW8 745-05 Encounter: [id number]    Behavior over the last 24 hours: minimal improvement  Myron Speaker is still disorganized and still has some paranoia - has been accusing staff of taking her belongings  Seems distracted at times, reports visual hallucinations of "shadows"  Poor sleep last night - as per staff report she was awake most of the night, but when asked about that she answered 'I slept fine"  Compliant with medications - wants to remain on Tegretol and Seroquel and plans to stop breastfeeding after discharge  Attends some groups  Sleep: decreased  Appetite: normal  Medication side effects: No   ROS: reports heartburn and urinary incontinence, denies any shortness of breath or chest pain    Mental Status Evaluation:    Appearance:  casually dressed   Behavior:  cooperative, limited eye contact   Speech:  soft, disorganized   Mood:  dysphoric, anxious   Affect:  blunted   Thought Process:  disorganized   Associations: tangential associations   Thought Content:  paranoid delusions   Perceptual Disturbances: no auditory hallucinations, visual hallucinations of "shadows", appears distracted   Risk Potential: Suicidal ideation - None at present  Homicidal ideation - None  Potential for aggression - No   Sensorium:  oriented to person, place and time/date   Memory:  recent and remote memory grossly intact   Consciousness:  alert and awake   Attention: poor concentration and poor attention span   Insight:  impaired   Judgment: impaired   Gait/Station: normal gait/station and normal balance   Motor Activity: no abnormal movements     Vital signs in last 24 hours:    Temp:  [97 5 °F (36 4 °C)-98 3 °F (36 8 °C)] 97 5 °F (36 4 °C)  HR:  [76-88] 76  Resp:  [16] 16  BP: (114-136)/(78-82) 114/78    Laboratory results:  I have personally reviewed all pertinent laboratory/tests results      Most Recent Labs:   Lab Results Component Value Date    WBC 7 33 11/27/2017    RBC 4 07 11/27/2017    HGB 11 9 11/27/2017    HCT 36 8 11/27/2017     11/27/2017    RDW 14 0 11/27/2017    NEUTROABS 4 89 11/27/2017     11/27/2017    K 4 1 11/27/2017     11/27/2017    CO2 28 11/27/2017    BUN 8 11/27/2017    CREATININE 0 70 11/27/2017    GLUCOSE 89 11/27/2017    CALCIUM 8 5 11/27/2017    AST 14 11/27/2017    ALT 22 11/27/2017    ALKPHOS 55 11/27/2017    PROT 6 3 (L) 11/27/2017    ALBUMIN 2 8 (L) 11/27/2017    BILITOT 0 31 11/27/2017    CHOL 146 11/27/2017    HDL 52 11/27/2017    TRIG 80 11/27/2017    LDLCALC 78 11/27/2017    [id number] 0 678 11/27/2017    PREGSERUM Negative 11/27/2017   Drug Screen:   Lab Results   Component Value Date    BARBTUR Negative 11/25/2017    BDZUR Negative 11/25/2017    THCUR Negative 11/25/2017    COCAINEUR Negative 11/25/2017    METHADONEUR Negative 11/25/2017    OPIATEUR Negative 11/25/2017    PCPUR Negative 11/25/2017       Progress Toward Goals: limited progress, poor reality testing, still has psychotic symptoms    Assessment/Plan   Principal Problem:    Bipolar I disorder, most recent episode mixed, severe with psychotic features ([facility] [address] )    Recommended Treatment:     Planned medication and treatment changes: All current active medications have been reviewed  Encourage group therapy, milieu therapy and occupational therapy  Behavioral Health checks every 15 minutes  Increase Seroquel to 150 mg at bedtime and titrate dose  Check Tegretol level in the morning     Continue all other medications:      carBAMazepine 100 mg Oral BID   nicotine 21 mg Transdermal Daily   QUEtiapine 150 mg Oral HS       Risks / Benefits of Treatment:    Risks, benefits, and possible side effects of medications explained to patient and patient verbalizes understanding and agreement for treatment  Risks of medications in pregnancy explained if female patient   Patient verbalizes understanding and agrees to notify her doctor if she becomes pregnant  Counseling / Coordination of Care:    Patient's progress discussed with staff in treatment team meeting  Medications, treatment progress and treatment plan reviewed with patient  Medication changes discussed with patient

## 2017-11-28 NOTE — PROGRESS NOTES
Pt is out in the milieu and interacts with select peers  When pt asked why she came into the hospital, she states that she came from long-term  States that they were "barely feeding her there  " Pt denies that she stole a car  States that it was her boyfriend's car  States that she thinks she was arrested for "resisting arrest " Pt thoughts disorganized at times  Pt went on to talk about feeling" light headed and dizzy" when arrested and thinks it was because her sugar was low  Denies SI/HI

## 2017-11-28 NOTE — MEDICAL STUDENT
Progress Note - Behavioral Health   Patrick Rasmussen 40 y o  female MRN: [id number]  Unit/Bed#: FN5 381-88 Encounter: [id number]    Assessment/Plan   Principal Problem:    Atypical bipolar affective disorder ([address] )      Continue medication management and supportive therapy  Contact Dr Lopez Friends [phone] for medication timeline  Update ACT team , Yeny Hilario with updates in care [phone]  Contact pastoral care again for their services and a bible  Order Betamethasone cream for eczema rash    Patient says she is doing well  She had a visit last night from her family  She said she saw her phone with missed calls from her boyfriend giving her reassurance he still cares for her  The visit went well despite her strained relationship with her mother  Patient is now concerned for missing items: reading glasses and a book bag  MHTs report that they have repeatedly assured her that her belongings are locked up and safe with visual confirmation  She reports having mild anxiety yesterday to which she overcame with deep breathing exercises  Appetite has been good and patient slept well through the night  Staff reports patient is incontinent with urine  Patient indicates she feels less tired today and has a minor headache in the front of her head  Patient is pleased with medication regimen but still appears guarded  She denies any suicidal thoughts, plans or intent, homicidal ideations or hallucinations       Behavior over the last 24 hours:  unchanged  Sleep: normal  Appetite: normal  Medication side effects: No  ROS: headache, eczema, incontinece    Mental Status Evaluation:  Appearance:  age appropriate and casually dressed   Behavior:  guarded   Speech:  normal pitch and normal volume   Mood:  constricted   Affect:  blunted   Thought Process:  goal directed   Thought Content:  normal   Perceptual Disturbances: None   Risk Potential: Suicidal Ideations none, Homicidal Ideations none and Potential for Aggression No Sensorium:  person, place and time/date   Cognition:  grossly intact   Consciousness:  alert and awake    Attention: attention span and concentration were age appropriate   Insight:  limited   Judgment: limited   Gait/Station: normal gait/station and normal balance   Motor Activity: no abnormal movements     Progress Toward Goals: progressing    Recommended Treatment: Continue with group therapy, milieu therapy and occupational therapy  Risks, benefits and possible side effects of Medications:   Risks, benefits, and possible side effects of medications explained to patient and patient verbalizes understanding  Medications: all current active meds have been reviewed  Labs:   Lab Results   Component Value Date    WBC 7 33 11/27/2017    HGB 11 9 11/27/2017    HCT 36 8 11/27/2017    MCV 90 11/27/2017     11/27/2017     Lab Results   Component Value Date    GLUCOSE 89 11/27/2017    CALCIUM 8 5 11/27/2017     11/27/2017    K 4 1 11/27/2017    CO2 28 11/27/2017     11/27/2017    BUN 8 11/27/2017    CREATININE 0 70 11/27/2017       DISPO: Patient is guarded and evasive  Awaiting her to become more engaged and insightful about her situation and mental health care

## 2017-11-28 NOTE — PLAN OF CARE
Problem: Alteration in Thoughts and Perception  Goal: Treatment Goal: Gain control of psychotic behaviors/thinking, reduce/eliminate presenting symptoms and demonstrate improved reality functioning upon discharge  Outcome: Progressing    Goal: Verbalize thoughts and feelings  Interventions:  - Promote a nonjudgmental and trusting relationship with the patient through active listening and therapeutic communication  - Assess patient's level of functioning, behavior and potential for risk  - Engage patient in 1 on 1 interactions for a minimum of 15 minutes each session  - Encourage patient to express fears, feelings, frustrations, and discuss symptoms    - [place] patient to reality, help patient recognize reality-based thinking   - Administer medications as ordered and assess for potential side effects  - Provide the patient education related to the signs and symptoms of the illness and desired effects of prescribed medications   Outcome: Progressing    Goal: Refrain from acting on delusional thinking/internal stimuli  Interventions:  - Monitor patient closely, per order   - Utilize least restrictive measures   - Set reasonable limits, give positive feedback for acceptable   - Administer medications as ordered and monitor of potential side effects   Outcome: Progressing    Goal: Agree to be compliant with medication regime, as prescribed and report medication side effects  Interventions:  - Offer appropriate PRN medication and supervise ingestion; conduct aims, as needed    Outcome: Progressing    Goal: Recognize dysfunctional thoughts, communicate reality-based thoughts at the time of discharge  Interventions:  - Provide medication and psycho-education to assist patient in compliance and developing insight into his/her illness    Outcome: Progressing    Goal: Complete daily ADLs, including personal hygiene independently, as able  Interventions:  - Observe, teach, and assist patient with ADLS  - Monitor and promote a balance of rest/activity, with adequate nutrition and elimination    Outcome: Progressing      Problem: DISCHARGE PLANNING  Goal: Discharge to home or other facility with appropriate resources  INTERVENTIONS:  - Identify barriers to discharge w/patient and caregiver  - Arrange for needed discharge resources and transportation as appropriate  - Identify discharge learning needs (meds, wound care, etc )  - Refer to Case Management Department for coordinating discharge planning if the patient needs post-hospital services based on physician/advanced practitioner order or complex needs related to functional status, cognitive ability, or social support system    Outcome: Progressing      Problem: Ineffective Coping  Goal: Participates in unit activities  Interventions:  - Provide therapeutic environment   - Provide required programming   - Redirect inappropriate behaviors    Outcome: Progressing

## 2017-11-29 LAB
BACTERIA UR CULT: NORMAL
CARBAMAZEPINE SERPL-MCNC: 4.3 UG/ML (ref 4–12)

## 2017-11-29 PROCEDURE — 80156 ASSAY CARBAMAZEPINE TOTAL: CPT | Performed by: PSYCHIATRY & NEUROLOGY

## 2017-11-29 RX ORDER — CARBAMAZEPINE 100 MG/1
200 TABLET, CHEWABLE ORAL 2 TIMES DAILY
Status: DISCONTINUED | OUTPATIENT
Start: 2017-11-29 | End: 2017-12-01

## 2017-11-29 RX ORDER — CARBAMAZEPINE 100 MG/1
100 TABLET, CHEWABLE ORAL ONCE
Status: COMPLETED | OUTPATIENT
Start: 2017-11-29 | End: 2017-11-29

## 2017-11-29 RX ORDER — LANOLIN ALCOHOL/MO/W.PET/CERES
6 CREAM (GRAM) TOPICAL
Status: DISCONTINUED | OUTPATIENT
Start: 2017-11-29 | End: 2017-11-29

## 2017-11-29 RX ORDER — BETAMETHASONE DIPROPIONATE 0.05 %
OINTMENT (GRAM) TOPICAL 2 TIMES DAILY
Status: DISCONTINUED | OUTPATIENT
Start: 2017-11-29 | End: 2017-12-15 | Stop reason: HOSPADM

## 2017-11-29 RX ORDER — LANOLIN ALCOHOL/MO/W.PET/CERES
3 CREAM (GRAM) TOPICAL
Status: DISCONTINUED | OUTPATIENT
Start: 2017-11-29 | End: 2017-11-30

## 2017-11-29 RX ADMIN — QUETIAPINE FUMARATE 250 MG: 200 TABLET ORAL at 21:48

## 2017-11-29 RX ADMIN — CARBAMAZEPINE 100 MG: 100 TABLET, CHEWABLE ORAL at 10:26

## 2017-11-29 RX ADMIN — MELATONIN TAB 3 MG 3 MG: 3 TAB at 21:48

## 2017-11-29 RX ADMIN — CARBAMAZEPINE 200 MG: 100 TABLET, CHEWABLE ORAL at 17:39

## 2017-11-29 RX ADMIN — BETAMETHASONE DIPROPIONATE 1 APPLICATION: 0.5 OINTMENT TOPICAL at 10:26

## 2017-11-29 RX ADMIN — CARBAMAZEPINE 100 MG: 100 TABLET, CHEWABLE ORAL at 08:11

## 2017-11-29 RX ADMIN — NICOTINE 21 MG: 21 PATCH, EXTENDED RELEASE TRANSDERMAL at 08:11

## 2017-11-29 RX ADMIN — BETAMETHASONE DIPROPIONATE: 0.5 OINTMENT TOPICAL at 17:57

## 2017-11-29 NOTE — MEDICAL STUDENT
Progress Note - Behavioral Health   Gomez Hernandez 40 y o  female MRN: [id number]  Unit/Bed#: XH9 197-44 Encounter: [id number]    Assessment/Plan   Principal Problem:    Bipolar I disorder, most recent episode mixed, severe with psychotic features ([facility] [address] )    Patient has a history of bipolar disorder that has been stable on a low dose of Tegretol  This admission has been her first manic episode in 2 years according to her ACT team , Roscoe Mead  Patient continues to minimize her symptoms in an effort to be discharged sooner  On multiple occassions staff reports events of patient incontinence, dizziness, forgetfulness and odd behaviors and patient continues to deny these events occurred  She is compliant with medications and attends all groups  She continues to be guarded and has a blunted affect  She is not currently suicidal and is not having hallucinations, delusions, paranoia or homicidal ideation  Increase Seroquel to 250mg qhs  Change Betamethasone cream to scheduled bid  Add Melatonin 3mg qhs for sleep  Continue psychotropic management  Arrange family meeting for follow up care planning    Patient stated she slept well during the night, contrary to staff reports of her having difficulty sleeping due to feeling dizzy  Patient denies ever feeling dizzy  Last night patient was confused believing she had not seen the doctor all day  When asked about this today, she denied the occurrence  Patient is eating well, attending groups and commented on her irritation with other patients that attempt to engage her in conversation as she would prefer to be left alone  Patient is having increased anxiety due to wanting to be home for her daughter's13th birthday tomorrow  Of note, patient does report burning with urination, urgency and day time fatigue  Patient denies suicidal thoughts, plan or intent  She is not experiencing hallucinations, delusions or paranoia       Behavior over the last 24 hours: unchanged  Sleep: insomnia  Appetite: normal  Medication side effects: Yes , day time fatigue  ROS: dizziness, dysuria, polyuria    Mental Status Evaluation:  Appearance:  age appropriate and casually dressed   Behavior:  guarded   Speech:  increased latency of response   Mood:  anxious and constricted   Affect:  blunted   Thought Process:  goal directed and disorganized at times   Thought Content:  normal   Perceptual Disturbances: None   Risk Potential: Suicidal Ideations none, Homicidal Ideations none and Potential for Aggression No   Sensorium:  person, place, time/date and year   Cognition:  grossly intact   Consciousness:  alert and awake    Attention: attention span appeared shorter than expected for age   Insight:  limited   Judgment: limited   Gait/Station: normal gait/station and normal balance   Motor Activity: no abnormal movements     Progress Toward Goals: progressing    Recommended Treatment: Continue with group therapy, milieu therapy and occupational therapy  Risks, benefits and possible side effects of Medications:   Risks, benefits, and possible side effects of medications explained to patient and patient verbalizes understanding  Medications: all current active meds have been reviewed  Labs:  Carbamazepine level: 4 3  Urine dipstick shows negative for all components, positive for leukocytes      DISPO: family meeting with boyfriend to discuss patient follow up care and medication adherence

## 2017-11-29 NOTE — PROGRESS NOTES
Pt complained of feeling dizzy  She appeared very sleepy and was encouraged to go to bed  Vital signs were within normal limits

## 2017-11-29 NOTE — PROGRESS NOTES
Progress Note - [address] 40 y o  female MRN: [id number]   Unit/Bed#: KQ0 248-78 Encounter: [id number]    Behavior over the last 24 hours: unchanged  Edmund Lebron remains disorganized and irritable at times  She still has vague visual hallucinations of shadows" and vague auditory hallucinations of "radio box" - seems distracted and preoccupied  Poor concentration, poor memory  Was telling staff yesterday that she "did not see a doctor for several days"  Awake since 4:30 AM, but she insists that she "slept well"  Compliant with medications  Attends group therapy  Sleep: decreased, early awakening  Appetite: improving  Medication side effects: No   ROS: reports abdominal discomfort, dizziness and uinary frequency, denies any shortness of breath or chest pain    Mental Status Evaluation:    Appearance:  casually dressed   Behavior:  cooperative, limited eye contact   Speech:  soft, tangential   Mood:  irritable   Affect:  blunted   Thought Process:  disorganized   Associations: tangential associations   Thought Content:  paranoid delusions   Perceptual Disturbances: auditory hallucinations of "radio box", visual hallucinations of "shadows"   Risk Potential: Suicidal ideation - None  Homicidal ideation - None  Potential for aggression - No   Sensorium:  oriented to person, place and time/date   Memory:  recent memory mildly impaired, remote memory intact   Consciousness:  alert and awake   Attention: poor concentration and poor attention span   Insight:  impaired   Judgment: impaired   Gait/Station: normal gait/station and normal balance   Motor Activity: no abnormal movements     Vital signs in last 24 hours:    Temp:  [97 8 °F (36 6 °C)-98 4 °F (36 9 °C)] 97 8 °F (36 6 °C)  HR:  [] 89  Resp:  [18] 18  BP: (127-138)/(71-98) 127/81    Laboratory results:  I have personally reviewed all pertinent laboratory/tests results      Recent Results (from the past 24 hour(s))   UA w Reflex to Microscopic w Reflex to Culture    Collection Time: 11/28/17 10:39 AM   Result Value Ref Range    Color, UA Yellow     Clarity, UA Cloudy     Specific [place], UA 1 015 1 003 - 1 030    pH, UA 8 0 4 5 - 8 0    Leukocytes, UA Large (A) Negative    Nitrite, UA Negative Negative    Protein, UA Negative Negative mg/dl    Glucose, UA Negative Negative mg/dl    Ketones, UA Negative Negative mg/dl    Urobilinogen, UA 1 0 0 2, 1 0 E U /dl E U /dl    Bilirubin, UA Negative Negative    Blood, UA Negative Negative   Urine Microscopic    Collection Time: 11/28/17 10:39 AM   Result Value Ref Range    RBC, UA None Seen None Seen, 0-5 /hpf    WBC, UA 10-20 (A) None Seen, 0-5, 5-55, 5-65 /hpf    Epithelial Cells Moderate (A) None Seen, Occasional /hpf    Bacteria, UA Occasional None Seen, Occasional /hpf    Hyaline Casts, UA None Seen None Seen /lpf   Carbamazepine level, total    Collection Time: 11/29/17  4:52 AM   Result Value Ref Range    Carbamazepine Lvl 4 3 4 0 - 12 0 ug/mL       Progress Toward Goals: no significant improvement, poor insight, still has psychotic symptoms    Assessment/Plan   Principal Problem:    Bipolar I disorder, most recent episode mixed, severe with psychotic features ([facility] [address] )    Recommended Treatment:     Planned medication and treatment changes: All current active medications have been reviewed    Encourage group therapy, milieu therapy and occupational therapy  Behavioral Health checks every 15 minutes  Increase Tegretol to 200 mg bid  Increase Seroquel to 250 mg at bedtime and titrate dose gradually  Add Melatonin 3 mg at bedtime to help with insomnia  Recheck Tegretol level, CBC and CMP in 2 days     Continue all other medications:      betamethasone dipropionate  Topical BID   carBAMazepine 200 mg Oral BID   melatonin 3 mg Oral HS   nicotine 21 mg Transdermal Daily   QUEtiapine 250 mg Oral HS       Risks / Benefits of Treatment:    Risks, benefits, and possible side effects of medications explained to patient and patient verbalizes understanding and agreement for treatment  Risks of medications in pregnancy explained if female patient  Patient verbalizes understanding and agrees to notify her doctor if she becomes pregnant  Counseling / Coordination of Care: Total floor / unit time spent today 35 minutes  Greater than 50% of total time was spent with the patient and / or family counseling and / or coordination of care  A description of counseling / coordination of care:    Patient's progress discussed with staff in treatment team meeting  Medications, treatment progress and treatment plan reviewed with patient  Medication changes discussed with patient  Medication education provided to patient  Importance of medication and treatment compliance reviewed with patient  Reoriented to reality and reassured

## 2017-11-29 NOTE — PROGRESS NOTES
Pt denies all S&S, compliant with meds  She asked her showering supplies to shower  Pt was OOB and visible in the milieu the entire shift but does not socialize with peers  Pt is guarded  She asked to see the doctor because she said she did not see her/his MD yet  She is calm, pleasant, and cooperative

## 2017-11-29 NOTE — PLAN OF CARE
Problem: Alteration in Thoughts and Perception  Goal: Treatment Goal: Gain control of psychotic behaviors/thinking, reduce/eliminate presenting symptoms and demonstrate improved reality functioning upon discharge  Outcome: Progressing    Goal: Verbalize thoughts and feelings  Interventions:  - Promote a nonjudgmental and trusting relationship with the patient through active listening and therapeutic communication  - Assess patient's level of functioning, behavior and potential for risk  - Engage patient in 1 on 1 interactions for a minimum of 15 minutes each session  - Encourage patient to express fears, feelings, frustrations, and discuss symptoms    - [place] patient to reality, help patient recognize reality-based thinking   - Administer medications as ordered and assess for potential side effects  - Provide the patient education related to the signs and symptoms of the illness and desired effects of prescribed medications   Outcome: Progressing    Goal: Refrain from acting on delusional thinking/internal stimuli  Interventions:  - Monitor patient closely, per order   - Utilize least restrictive measures   - Set reasonable limits, give positive feedback for acceptable   - Administer medications as ordered and monitor of potential side effects   Outcome: Progressing    Goal: Agree to be compliant with medication regime, as prescribed and report medication side effects  Interventions:  - Offer appropriate PRN medication and supervise ingestion; conduct aims, as needed    Outcome: Progressing    Goal: Recognize dysfunctional thoughts, communicate reality-based thoughts at the time of discharge  Interventions:  - Provide medication and psycho-education to assist patient in compliance and developing insight into his/her illness    Outcome: Progressing    Goal: Complete daily ADLs, including personal hygiene independently, as able  Interventions:  - Observe, teach, and assist patient with ADLS  - Monitor and promote a balance of rest/activity, with adequate nutrition and elimination    Outcome: Progressing      Problem: DISCHARGE PLANNING  Goal: Discharge to home or other facility with appropriate resources  INTERVENTIONS:  - Identify barriers to discharge w/patient and caregiver  - Arrange for needed discharge resources and transportation as appropriate  - Identify discharge learning needs (meds, wound care, etc )  - Refer to Case Management Department for coordinating discharge planning if the patient needs post-hospital services based on physician/advanced practitioner order or complex needs related to functional status, cognitive ability, or social support system    Outcome: Progressing      Problem: Ineffective Coping  Goal: Participates in unit activities  Interventions:  - Provide therapeutic environment   - Provide required programming   - Redirect inappropriate behaviors    Outcome: Progressing

## 2017-11-29 NOTE — PROGRESS NOTES
Patient denies SI/HI and denies any hallucinations this am  Patient out in milieu interacting well with other patients and staff

## 2017-11-30 PROBLEM — F19.10 POLYSUBSTANCE ABUSE (HCC): Status: ACTIVE | Noted: 2017-11-30

## 2017-11-30 PROBLEM — K59.00 CONSTIPATION: Status: ACTIVE | Noted: 2017-11-30

## 2017-11-30 PROBLEM — N39.0 UTI (URINARY TRACT INFECTION): Status: ACTIVE | Noted: 2017-11-30

## 2017-11-30 LAB
BACTERIA UR QL AUTO: ABNORMAL /HPF
BILIRUB UR QL STRIP: NEGATIVE
CLARITY UR: ABNORMAL
COLOR UR: YELLOW
GLUCOSE UR STRIP-MCNC: NEGATIVE MG/DL
HGB UR QL STRIP.AUTO: NEGATIVE
HYALINE CASTS #/AREA URNS LPF: ABNORMAL /LPF
KETONES UR STRIP-MCNC: NEGATIVE MG/DL
LEUKOCYTE ESTERASE UR QL STRIP: ABNORMAL
NITRITE UR QL STRIP: NEGATIVE
NON-SQ EPI CELLS URNS QL MICRO: ABNORMAL /HPF
PH UR STRIP.AUTO: 7.5 [PH] (ref 4.5–8)
PROT UR STRIP-MCNC: NEGATIVE MG/DL
RBC #/AREA URNS AUTO: ABNORMAL /HPF
SP GR UR STRIP.AUTO: 1.01 (ref 1–1.03)
UROBILINOGEN UR QL STRIP.AUTO: 1 E.U./DL
WBC #/AREA URNS AUTO: ABNORMAL /HPF

## 2017-11-30 PROCEDURE — 81001 URINALYSIS AUTO W/SCOPE: CPT | Performed by: PSYCHIATRY & NEUROLOGY

## 2017-11-30 RX ORDER — SULFAMETHOXAZOLE AND TRIMETHOPRIM 800; 160 MG/1; MG/1
1 TABLET ORAL EVERY 12 HOURS SCHEDULED
Status: DISPENSED | OUTPATIENT
Start: 2017-11-30 | End: 2017-12-03

## 2017-11-30 RX ORDER — QUETIAPINE FUMARATE 300 MG/1
300 TABLET, FILM COATED ORAL
Status: DISCONTINUED | OUTPATIENT
Start: 2017-11-30 | End: 2017-12-01

## 2017-11-30 RX ORDER — LANOLIN ALCOHOL/MO/W.PET/CERES
6 CREAM (GRAM) TOPICAL
Status: DISCONTINUED | OUTPATIENT
Start: 2017-11-30 | End: 2017-12-03

## 2017-11-30 RX ORDER — DOCUSATE SODIUM 100 MG/1
100 CAPSULE, LIQUID FILLED ORAL 2 TIMES DAILY
Status: DISCONTINUED | OUTPATIENT
Start: 2017-11-30 | End: 2017-12-15 | Stop reason: HOSPADM

## 2017-11-30 RX ORDER — POLYVINYL ALCOHOL 14 MG/ML
1 SOLUTION/ DROPS OPHTHALMIC
Status: DISCONTINUED | OUTPATIENT
Start: 2017-11-30 | End: 2017-12-15 | Stop reason: HOSPADM

## 2017-11-30 RX ADMIN — BETAMETHASONE DIPROPIONATE: 0.5 OINTMENT TOPICAL at 08:18

## 2017-11-30 RX ADMIN — CARBAMAZEPINE 200 MG: 100 TABLET, CHEWABLE ORAL at 08:14

## 2017-11-30 RX ADMIN — NICOTINE 21 MG: 21 PATCH, EXTENDED RELEASE TRANSDERMAL at 08:18

## 2017-11-30 RX ADMIN — DOCUSATE SODIUM 100 MG: 100 CAPSULE, LIQUID FILLED ORAL at 18:00

## 2017-11-30 RX ADMIN — MELATONIN TAB 3 MG 6 MG: 3 TAB at 22:44

## 2017-11-30 RX ADMIN — SULFAMETHOXAZOLE AND TRIMETHOPRIM 1 TABLET: 800; 160 TABLET ORAL at 22:00

## 2017-11-30 RX ADMIN — BETAMETHASONE DIPROPIONATE: 0.5 OINTMENT TOPICAL at 17:14

## 2017-11-30 RX ADMIN — CARBAMAZEPINE 200 MG: 100 TABLET, CHEWABLE ORAL at 17:55

## 2017-11-30 RX ADMIN — QUETIAPINE FUMARATE 300 MG: 300 TABLET, FILM COATED ORAL at 22:44

## 2017-11-30 RX ADMIN — SULFAMETHOXAZOLE AND TRIMETHOPRIM 1 TABLET: 800; 160 TABLET ORAL at 16:00
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
## 2017-11-30 NOTE — PROGRESS NOTES
Pt denies all signs and symptoms  Pt had to be redirected because she was telling another patient she did not need to take her medications  Pt stated that she understood  She is out in the milieu and interacting with peers
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
## 2017-11-30 NOTE — CONSULTS
[address] [facility], [address] Internal Medicine-SOD TEAM Lue Osler 40 y o  female MRN: [id number]  Unit/Bed#: GW0 851-50 Encounter: [id number]    Code Status: Level 1 - Full Code    Reason for Admission / Principal Problem: Bipolar disorder  Reason for Consult: UTI    Impression:  Patient Active Problem List   Diagnosis    Suicidal thoughts    Bipolar I disorder, most recent episode mixed, severe with psychotic features ([address] )    UTI (urinary tract infection)    Polysubstance abuse    Constipation        A/P: Ms Louann Troncoso is a 40year old female with a past medical history of Bipolar I disorder admitted to inpatient behavioral health units for worsening depression with SI whom we are consulted for medical management of UTI    1  UTI  · Given sx of increased frequency and dirty UA, though negative nitrites will treat with DS Bactrim PO x3 days  · Continue to monitor clinically    2  Hx of HTN? · Will continue to monitor  May consider starting low-dose ACEi if continues to be elevated  · Check lipid panel    3  Hx of pre-diabetes? · Fasting glucose on 11/27 89 which is acceptable  · Will check A1c  · For now, continue regular diet  Pending A1c may consider adding carb restriction to diet, checking microalb/Cr ratio, and starting statin  Hold off for now though    4  Constipation  · Start Colace BID  · Continue to monitor BM  Avoid opiates    5  Nicotine dependence/Polysubstance abuse  · Patient counseled on smoking cessation and polysubstance abuse  · Continue to monitor    6  Bipolar disorder/Depressed mood   · Management per psychiatry    HPI: Astrid Goldmann is a 40 y o  female whom we are consulted for medical management of UTI  Patient denies any dysuria or urgency, though does state she has increased frequency  She is afebrile without leukocytosis    UA showed large leuks, cloudy, neg nitrites occasional bacteria, 4-10WBC  UCx grew mixed contaminants  Patient denies fever, chills, vomiting  Does state she has some nausea associated with food intake  She denies any CP  Patient reports a history of hypertension  She states she use to take medication for this but does not remember what she took  She also states she has a hx of prediabetes and is not on medication nor ever has been  Family history significant for father with heart disease and HTN, Mother with prediabetes and depression    History obtained from Chart review and patient    PMH:  Past Medical History:   Diagnosis Date    Bipolar disorder ([facility] [address] )     Eczema         PSH:  History reviewed  No pertinent surgical history  Allergies: Allergies   Allergen Reactions    Allopurinol     Lithium     Risperdal [Risperidone]     Valproic Acid And Related        Family History:   Family History   Problem Relation Age of Onset    Depression Mother     Heart disease Father     Hypertension Father        Social History:   History   Smoking Status    Current Every Day Smoker    Packs/day: 1 00   Smokeless Tobacco    Never Used      History   Alcohol Use    1 8 oz/week    2 Glasses of wine, 1 Cans of beer per week      History   Drug Use    Frequency: 1 0 time per week    Types: Marijuana        Home Medications:   Prior to Admission medications    Medication Sig Start Date End Date Taking? Authorizing Provider   ARIPiprazole (ABILIFY) 15 mg tablet Take 15 mg by mouth 12/5/15  Yes Historical Provider, MD   betamethasone dipropionate (DIPROSONE) 0 05 % ointment APPLY TO AFFECTED AREA TWICE A DAY AS NEEDED FOR RASH / [address] SKIN 10/5/17  Yes Historical Provider, MD   carBAMazepine (TEGretol) 100 mg/5 mL suspension Take by mouth 4 (four) times a day      Historical Provider, MD       ROS:   Review of Systems   Respiratory: Negative for cough and shortness of breath  Cardiovascular: Negative for chest pain, palpitations and leg swelling  Gastrointestinal: Positive for abdominal pain, constipation and nausea  Negative for abdominal distention, blood in stool, diarrhea and vomiting  Endocrine: Positive for polyuria  Genitourinary: Positive for frequency  Negative for decreased urine volume, difficulty urinating, dysuria, flank pain, hematuria, pelvic pain and urgency  Musculoskeletal: Negative  Allergic/Immunologic: Negative  Neurological: Negative for dizziness, tremors, seizures, syncope, numbness and headaches  Hematological: Negative  Vitals:   Vitals:    11/29/17 0716 11/29/17 1510 11/29/17 2056 11/30/17 0725   BP: 127/81 170/88 162/94 130/90   Pulse: 89 99 96 85   Resp: 18 18 16 18   Temp: 97 8 °F (36 6 °C) 98 2 °F (36 8 °C)  97 5 °F (36 4 °C)   TempSrc: Oral Oral  Oral   SpO2:    99%   Weight:       Height:         Temp  Min: 97 5 °F (36 4 °C)  Max: 98 4 °F (36 9 °C)  IBW: 57 kg  Body mass index is 34 95 kg/m²  PHYSICAL EXAM:  Physical Exam   Constitutional: She is oriented to person, place, and time  She appears well-developed and well-nourished  Obese   HENT:   Head: Normocephalic and atraumatic  Eyes: Pupils are equal, round, and reactive to light  Neck: Normal range of motion  Cardiovascular: Regular rhythm and normal heart sounds  Exam reveals no gallop and no friction rub  No murmur heard  Sinus tachycardia   Pulmonary/Chest: Breath sounds normal  No respiratory distress  She has no wheezes  She has no rales  Abdominal: Soft  She exhibits no distension  There is tenderness  Musculoskeletal: Normal range of motion  She exhibits no edema  Neurological: She is alert and oriented to person, place, and time  Skin: Skin is warm and dry  Vitals reviewed        Labs:     Results from last 7 days  Lab Units 11/27/17  0545 11/25/17  0119   WBC Thousand/uL 7 33 10 46*   HEMOGLOBIN g/dL 11 9 12 3   HEMATOCRIT % 36 8 37 6   PLATELETS Thousands/uL 242 251   NEUTROS PCT % 67 71   MONOS PCT % 9 6 Results from last 7 days  Lab Units 11/27/17  0545 11/25/17  0119   SODIUM mmol/L 142 142   POTASSIUM mmol/L 4 1 3 9   CHLORIDE mmol/L 108 108   CO2 mmol/L 28 29   BUN mg/dL 8 9   CREATININE mg/dL 0 70 0 68   CALCIUM mg/dL 8 5 8 4   TOTAL PROTEIN g/dL 6 3* 6 0*   BILIRUBIN TOTAL mg/dL 0 31 0 33   ALK PHOS U/L 55 59   ALT U/L 22 22   AST U/L 14 13   GLUCOSE RANDOM mg/dL 89 99     Component      Latest Ref Rng & Units 11/30/2017   Color, UA       Yellow   Clarity, UA       Cloudy   Specific [place], UA      1 003 - 1 030 1 008   pH, UA      4 5 - 8 0 7 5   Leukocytes, UA      Negative Large (A)   Nitrite, UA      Negative Negative   Protein, UA      Negative mg/dl Negative   Glucose, UA      Negative mg/dl Negative   Ketones, UA      Negative mg/dl Negative   Urobilinogen, UA      0 2, 1 0 E U /dl E U /dl 1 0   Bilirubin, UA      Negative Negative   Blood, UA      Negative Negative     Component      Latest Ref Rng & Units 11/30/2017   RBC, UA      None Seen, 0-5 /hpf None Seen   WBC, UA      None Seen, 0-5, 5-55, 5-65 /hpf 4-10 (A)   Epithelial Cells      None Seen, Occasional /hpf Occasional   Bacteria, UA      None Seen, Occasional /hpf Occasional   Hyaline Casts, UA      None Seen /lpf None Seen         No results found for: PHOS       No results found for: TROPONINT  ABG:No results found for: PHART, CQK6TKP, PO2ART, FZG4NIQ, U5VTOFIO, BEART, SOURCE    Imaging: I have personally reviewed pertinent reports        Micro:  Blood Culture: No results found for: BLOODCX  Urine Culture:   Lab Results   Component Value Date    URINECX 20,000-29,000 cfu/ml  11/28/2017     Sputum Culture: No components found for: SPUTUMCX  Wound Culure: No results found for: WOUNDCULT    VTE Pharmacologic Prophylaxis: Reason for no pharmacologic prophylaxis Patient is low-risk for VTE  VTE Mechanical Prophylaxis: reason for no mechanical VTE prophylaxis Patient is low-risk for VTE    Keith Mora MD  11/30/2017 2:36 PM
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
## 2017-11-30 NOTE — PLAN OF CARE
Problem: Alteration in Thoughts and Perception  Goal: Treatment Goal: Gain control of psychotic behaviors/thinking, reduce/eliminate presenting symptoms and demonstrate improved reality functioning upon discharge  Outcome: Progressing    Goal: Verbalize thoughts and feelings  Interventions:  - Promote a nonjudgmental and trusting relationship with the patient through active listening and therapeutic communication  - Assess patient's level of functioning, behavior and potential for risk  - Engage patient in 1 on 1 interactions for a minimum of 15 minutes each session  - Encourage patient to express fears, feelings, frustrations, and discuss symptoms    - [place] patient to reality, help patient recognize reality-based thinking   - Administer medications as ordered and assess for potential side effects  - Provide the patient education related to the signs and symptoms of the illness and desired effects of prescribed medications   Outcome: Progressing    Goal: Refrain from acting on delusional thinking/internal stimuli  Interventions:  - Monitor patient closely, per order   - Utilize least restrictive measures   - Set reasonable limits, give positive feedback for acceptable   - Administer medications as ordered and monitor of potential side effects   Outcome: Progressing    Goal: Agree to be compliant with medication regime, as prescribed and report medication side effects  Interventions:  - Offer appropriate PRN medication and supervise ingestion; conduct aims, as needed    Outcome: Progressing    Goal: Recognize dysfunctional thoughts, communicate reality-based thoughts at the time of discharge  Interventions:  - Provide medication and psycho-education to assist patient in compliance and developing insight into his/her illness    Outcome: Progressing    Goal: Complete daily ADLs, including personal hygiene independently, as able  Interventions:  - Observe, teach, and assist patient with ADLS  - Monitor and promote a balance of rest/activity, with adequate nutrition and elimination    Outcome: Progressing      Problem: DISCHARGE PLANNING  Goal: Discharge to home or other facility with appropriate resources  INTERVENTIONS:  - Identify barriers to discharge w/patient and caregiver  - Arrange for needed discharge resources and transportation as appropriate  - Identify discharge learning needs (meds, wound care, etc )  - Refer to Case Management Department for coordinating discharge planning if the patient needs post-hospital services based on physician/advanced practitioner order or complex needs related to functional status, cognitive ability, or social support system    Outcome: Progressing      Problem: Ineffective Coping  Goal: Participates in unit activities  Interventions:  - Provide therapeutic environment   - Provide required programming   - Redirect inappropriate behaviors    Outcome: Progressing

## 2017-12-01 PROBLEM — F12.10 CANNABIS ABUSE: Chronic | Status: ACTIVE | Noted: 2017-11-30

## 2017-12-01 LAB
ALBUMIN SERPL BCP-MCNC: 2.9 G/DL (ref 3.5–5)
ALP SERPL-CCNC: 65 U/L (ref 46–116)
ALT SERPL W P-5'-P-CCNC: 29 U/L (ref 12–78)
ANION GAP SERPL CALCULATED.3IONS-SCNC: 5 MMOL/L (ref 4–13)
AST SERPL W P-5'-P-CCNC: 14 U/L (ref 5–45)
BASOPHILS # BLD AUTO: 0.02 THOUSANDS/ΜL (ref 0–0.1)
BASOPHILS NFR BLD AUTO: 0 % (ref 0–1)
BILIRUB SERPL-MCNC: 0.24 MG/DL (ref 0.2–1)
BUN SERPL-MCNC: 11 MG/DL (ref 5–25)
CALCIUM SERPL-MCNC: 8.5 MG/DL (ref 8.3–10.1)
CARBAMAZEPINE SERPL-MCNC: 5.7 UG/ML (ref 4–12)
CHLORIDE SERPL-SCNC: 106 MMOL/L (ref 100–108)
CHOLEST SERPL-MCNC: 156 MG/DL (ref 50–200)
CO2 SERPL-SCNC: 28 MMOL/L (ref 21–32)
CREAT SERPL-MCNC: 0.78 MG/DL (ref 0.6–1.3)
EOSINOPHIL # BLD AUTO: 0.01 THOUSAND/ΜL (ref 0–0.61)
EOSINOPHIL NFR BLD AUTO: 0 % (ref 0–6)
ERYTHROCYTE [DISTWIDTH] IN BLOOD BY AUTOMATED COUNT: 13.6 % (ref 11.6–15.1)
EST. AVERAGE GLUCOSE BLD GHB EST-MCNC: 97 MG/DL
GFR SERPL CREATININE-BSD FRML MDRD: 98 ML/MIN/1.73SQ M
GLUCOSE P FAST SERPL-MCNC: 87 MG/DL (ref 65–99)
GLUCOSE SERPL-MCNC: 87 MG/DL (ref 65–140)
HBA1C MFR BLD: 5 % (ref 4.2–6.3)
HCT VFR BLD AUTO: 39.5 % (ref 34.8–46.1)
HDLC SERPL-MCNC: 41 MG/DL (ref 40–60)
HGB BLD-MCNC: 12.5 G/DL (ref 11.5–15.4)
LDLC SERPL CALC-MCNC: 93 MG/DL (ref 0–100)
LYMPHOCYTES # BLD AUTO: 2.48 THOUSANDS/ΜL (ref 0.6–4.47)
LYMPHOCYTES NFR BLD AUTO: 33 % (ref 14–44)
MCH RBC QN AUTO: 28.5 PG (ref 26.8–34.3)
MCHC RBC AUTO-ENTMCNC: 31.6 G/DL (ref 31.4–37.4)
MCV RBC AUTO: 90 FL (ref 82–98)
MONOCYTES # BLD AUTO: 0.45 THOUSAND/ΜL (ref 0.17–1.22)
MONOCYTES NFR BLD AUTO: 6 % (ref 4–12)
NEUTROPHILS # BLD AUTO: 4.44 THOUSANDS/ΜL (ref 1.85–7.62)
NEUTS SEG NFR BLD AUTO: 61 % (ref 43–75)
NRBC BLD AUTO-RTO: 0 /100 WBCS
PLATELET # BLD AUTO: 297 THOUSANDS/UL (ref 149–390)
PMV BLD AUTO: 10.7 FL (ref 8.9–12.7)
POTASSIUM SERPL-SCNC: 4.1 MMOL/L (ref 3.5–5.3)
PROT SERPL-MCNC: 6.7 G/DL (ref 6.4–8.2)
RBC # BLD AUTO: 4.39 MILLION/UL (ref 3.81–5.12)
SODIUM SERPL-SCNC: 139 MMOL/L (ref 136–145)
TRIGL SERPL-MCNC: 109 MG/DL
WBC # BLD AUTO: 7.43 THOUSAND/UL (ref 4.31–10.16)

## 2017-12-01 PROCEDURE — 80156 ASSAY CARBAMAZEPINE TOTAL: CPT | Performed by: PSYCHIATRY & NEUROLOGY

## 2017-12-01 PROCEDURE — 80061 LIPID PANEL: CPT | Performed by: INTERNAL MEDICINE

## 2017-12-01 PROCEDURE — 80053 COMPREHEN METABOLIC PANEL: CPT | Performed by: PSYCHIATRY & NEUROLOGY

## 2017-12-01 PROCEDURE — 83036 HEMOGLOBIN GLYCOSYLATED A1C: CPT | Performed by: INTERNAL MEDICINE

## 2017-12-01 PROCEDURE — 85025 COMPLETE CBC W/AUTO DIFF WBC: CPT | Performed by: PSYCHIATRY & NEUROLOGY

## 2017-12-01 RX ORDER — GABAPENTIN 300 MG/1
300 CAPSULE ORAL 3 TIMES DAILY
Status: DISCONTINUED | OUTPATIENT
Start: 2017-12-03 | End: 2017-12-03

## 2017-12-01 RX ORDER — HYDROXYZINE 50 MG/1
50 TABLET, FILM COATED ORAL EVERY 6 HOURS PRN
Status: DISCONTINUED | OUTPATIENT
Start: 2017-12-01 | End: 2017-12-15 | Stop reason: HOSPADM

## 2017-12-01 RX ORDER — QUETIAPINE FUMARATE 25 MG/1
25 TABLET, FILM COATED ORAL 2 TIMES DAILY
Status: COMPLETED | OUTPATIENT
Start: 2017-12-01 | End: 2017-12-01

## 2017-12-01 RX ORDER — QUETIAPINE FUMARATE 200 MG/1
400 TABLET, FILM COATED ORAL
Status: DISCONTINUED | OUTPATIENT
Start: 2017-12-02 | End: 2017-12-05

## 2017-12-01 RX ORDER — GABAPENTIN 100 MG/1
200 CAPSULE ORAL 3 TIMES DAILY
Status: DISPENSED | OUTPATIENT
Start: 2017-12-02 | End: 2017-12-03

## 2017-12-01 RX ORDER — CARBAMAZEPINE 100 MG/1
200 TABLET, CHEWABLE ORAL DAILY
Status: DISCONTINUED | OUTPATIENT
Start: 2017-12-02 | End: 2017-12-15 | Stop reason: HOSPADM

## 2017-12-01 RX ORDER — CARBAMAZEPINE 100 MG/1
400 TABLET, CHEWABLE ORAL EVERY EVENING
Status: DISCONTINUED | OUTPATIENT
Start: 2017-12-01 | End: 2017-12-15 | Stop reason: HOSPADM

## 2017-12-01 RX ORDER — QUETIAPINE FUMARATE 25 MG/1
50 TABLET, FILM COATED ORAL 2 TIMES DAILY
Status: DISCONTINUED | OUTPATIENT
Start: 2017-12-02 | End: 2017-12-04

## 2017-12-01 RX ORDER — GABAPENTIN 100 MG/1
100 CAPSULE ORAL 3 TIMES DAILY
Status: COMPLETED | OUTPATIENT
Start: 2017-12-01 | End: 2017-12-01

## 2017-12-01 RX ADMIN — CARBAMAZEPINE 200 MG: 100 TABLET, CHEWABLE ORAL at 08:23

## 2017-12-01 RX ADMIN — DOCUSATE SODIUM 100 MG: 100 CAPSULE, LIQUID FILLED ORAL at 08:23

## 2017-12-01 RX ADMIN — MELATONIN TAB 3 MG 6 MG: 3 TAB at 22:18

## 2017-12-01 RX ADMIN — GABAPENTIN 100 MG: 100 CAPSULE ORAL at 16:57

## 2017-12-01 RX ADMIN — OLANZAPINE 10 MG: 10 INJECTION, POWDER, FOR SOLUTION INTRAMUSCULAR at 22:23

## 2017-12-01 RX ADMIN — GABAPENTIN 100 MG: 100 CAPSULE ORAL at 11:38

## 2017-12-01 RX ADMIN — SULFAMETHOXAZOLE AND TRIMETHOPRIM 1 TABLET: 800; 160 TABLET ORAL at 22:18

## 2017-12-01 RX ADMIN — SULFAMETHOXAZOLE AND TRIMETHOPRIM 1 TABLET: 800; 160 TABLET ORAL at 08:23

## 2017-12-01 RX ADMIN — GABAPENTIN 100 MG: 100 CAPSULE ORAL at 22:00

## 2017-12-01 RX ADMIN — CARBAMAZEPINE 400 MG: 100 TABLET, CHEWABLE ORAL at 18:45

## 2017-12-01 RX ADMIN — DOCUSATE SODIUM 100 MG: 100 CAPSULE, LIQUID FILLED ORAL at 17:18

## 2017-12-01 RX ADMIN — NICOTINE 21 MG: 21 PATCH, EXTENDED RELEASE TRANSDERMAL at 08:25

## 2017-12-01 RX ADMIN — QUETIAPINE FUMARATE 25 MG: 25 TABLET ORAL at 16:57

## 2017-12-01 RX ADMIN — BETAMETHASONE DIPROPIONATE 1 APPLICATION: 0.5 OINTMENT TOPICAL at 18:54

## 2017-12-01 RX ADMIN — BETAMETHASONE DIPROPIONATE: 0.5 OINTMENT TOPICAL at 10:06

## 2017-12-01 RX ADMIN — QUETIAPINE FUMARATE 25 MG: 25 TABLET ORAL at 11:38

## 2017-12-01 NOTE — PROGRESS NOTES
Pt's mood is labile  Pt angry one minute and tearful the next  Pt's thoughts are disorganized  Pt stating her boyfriend is saying that the "[employer] [employer]" is printing that she is a "heroin addict " Pt states she said that she thinks is "diabetic" and that her sugar was "low " Pt then went on saying that her mother calls her a "whore " Pt denies all symptoms  Out and attending groups
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
## 2017-12-01 NOTE — PROGRESS NOTES
IM Residency Progress Note   Unit/Bed#: NW7 687-25 Encounter: [id number]  SOD Team C       Astrid Goldmann 40 y o  female [address] 27 Stay Days: 7      Assessment/Plan:    Principal Problem:    Bipolar I disorder, most recent episode mixed, severe with psychotic features ([address] )  Continue treatment as per psych  Active Problems:    UTI (urinary tract infection): started on DS bactrim yesterday, doing well, no episodes of fever, normal WBC, continue treatment for total of 3 days           Disposition: will sign off, continue inpatient psych treatment      Subjective:   Patient seen and examined at her room, patient is feeling tired, is tolerating PO route and having BM, denies urinary symptoms       Vitals: Temp (24hrs), Av 1 °F (36 7 °C), Min:98 °F (36 7 °C), Max:98 1 °F (36 7 °C)  Current: Temperature: 98 1 °F (36 7 °C)  Vitals:    17 1557 17 2033 17 0733 17 0828   BP: 163/92 153/82 (!) 147/107 158/86   Pulse: 97 105 100 86   Resp: 16  16 16   Temp: 98 °F (36 7 °C)  98 1 °F (36 7 °C)    TempSrc: Oral  Oral    SpO2:       Weight:       Height:        Body mass index is 34 95 kg/m²  No intake/output data recorded        Physical Exam: /86   Pulse 86   Temp 98 1 °F (36 7 °C) (Oral)   Resp 16   Ht 5' 5" (1 651 m)   Wt 95 3 kg (210 lb)   SpO2 99%   BMI 34 95 kg/m²     General Appearance:    Alert, cooperative, no distress, appears stated age   Head:    Normocephalic, without obvious abnormality, atraumatic   Lungs:     Clear to auscultation bilaterally, respirations unlabored    Heart:    Regular rate and rhythm, S1 and S2 normal, no murmur, rub   or gallop   Abdomen:     Soft, non-tender, bowel sounds active all four quadrants,     no masses, no organomegaly   Extremities:   Extremities normal, atraumatic, no cyanosis or edema   Neurologic:   CNII-XII intact, normal strength, sensation and reflexes     throughout        Invasive Devices          No matching active lines, drains, or airways                    Labs:   Recent Results (from the past 24 hour(s))   UA w Reflex to Microscopic w Reflex to Culture    Collection Time: 11/30/17 12:40 PM   Result Value Ref Range    Color, UA Yellow     Clarity, UA Cloudy     Specific [place], UA 1 008 1 003 - 1 030    pH, UA 7 5 4 5 - 8 0    Leukocytes, UA Large (A) Negative    Nitrite, UA Negative Negative    Protein, UA Negative Negative mg/dl    Glucose, UA Negative Negative mg/dl    Ketones, UA Negative Negative mg/dl    Urobilinogen, UA 1 0 0 2, 1 0 E U /dl E U /dl    Bilirubin, UA Negative Negative    Blood, UA Negative Negative   Urine Microscopic    Collection Time: 11/30/17 12:40 PM   Result Value Ref Range    RBC, UA None Seen None Seen, 0-5 /hpf    WBC, UA 4-10 (A) None Seen, 0-5, 5-55, 5-65 /hpf    Epithelial Cells Occasional None Seen, Occasional /hpf    Bacteria, UA Occasional None Seen, Occasional /hpf    Hyaline Casts, UA None Seen None Seen /lpf   Carbamazepine level, total    Collection Time: 12/01/17  5:39 AM   Result Value Ref Range    Carbamazepine Lvl 5 7 4 0 - 12 0 ug/mL   CBC and differential    Collection Time: 12/01/17  5:39 AM   Result Value Ref Range    WBC 7 43 4 31 - 10 16 Thousand/uL    RBC 4 39 3 81 - 5 12 Million/uL    Hemoglobin 12 5 11 5 - 15 4 g/dL    Hematocrit 39 5 34 8 - 46 1 %    MCV 90 82 - 98 fL    MCH 28 5 26 8 - 34 3 pg    MCHC 31 6 31 4 - 37 4 g/dL    RDW 13 6 11 6 - 15 1 %    MPV 10 7 8 9 - 12 7 fL    Platelets 648 068 - 143 Thousands/uL    nRBC 0 /100 WBCs    Neutrophils Relative 61 43 - 75 %    Lymphocytes Relative 33 14 - 44 %    Monocytes Relative 6 4 - 12 %    Eosinophils Relative 0 0 - 6 %    Basophils Relative 0 0 - 1 %    Neutrophils Absolute 4 44 1 85 - 7 62 Thousands/µL    Lymphocytes Absolute 2 48 0 60 - 4 47 Thousands/µL    Monocytes Absolute 0 45 0 17 - 1 22 Thousand/µL    Eosinophils Absolute 0 01 0 00 - 0 61 Thousand/µL    Basophils Absolute 0 02 0 00 - 0 10 Thousands/µL Comprehensive metabolic panel    Collection Time: 12/01/17  5:39 AM   Result Value Ref Range    Sodium 139 136 - 145 mmol/L    Potassium 4 1 3 5 - 5 3 mmol/L    Chloride 106 100 - 108 mmol/L    CO2 28 21 - 32 mmol/L    Anion Gap 5 4 - 13 mmol/L    BUN 11 5 - 25 mg/dL    Creatinine 0 78 0 60 - 1 30 mg/dL    Glucose 87 65 - 140 mg/dL    Glucose, Fasting 87 65 - 99 mg/dL    Calcium 8 5 8 3 - 10 1 mg/dL    AST 14 5 - 45 U/L    ALT 29 12 - 78 U/L    Alkaline Phosphatase 65 46 - 116 U/L    Total Protein 6 7 6 4 - 8 2 g/dL    Albumin 2 9 (L) 3 5 - 5 0 g/dL    Total Bilirubin 0 24 0 20 - 1 00 mg/dL    eGFR 98 ml/min/1 73sq m   Hemoglobin A1c    Collection Time: 12/01/17  5:39 AM   Result Value Ref Range    Hemoglobin A1C 5 0 4 2 - 6 3 %    EAG 97 mg/dl   Lipid Panel with Direct LDL reflex    Collection Time: 12/01/17  5:39 AM   Result Value Ref Range    Cholesterol 156 50 - 200 mg/dL    Triglycerides 109 <=150 mg/dL    HDL, Direct 41 40 - 60 mg/dL    LDL Calculated 93 0 - 100 mg/dL       Radiology Results: I have personally reviewed pertinent reports  and I have personally reviewed pertinent films in PACS      Other Diagnostic Testing:   I have personally reviewed pertinent reports     and I have personally reviewed pertinent films in PACS      Active Meds:   Current Facility-Administered Medications   Medication Dose Route Frequency    acetaminophen (TYLENOL) tablet 650 mg  650 mg Oral Q6H PRN    aluminum-magnesium hydroxide-simethicone (MYLANTA) 200-200-20 mg/5 mL oral suspension 30 mL  30 mL Oral Q4H PRN    benztropine (COGENTIN) injection 1 mg  1 mg Intramuscular Q6H PRN    benztropine (COGENTIN) tablet 1 mg  1 mg Oral Q6H PRN    betamethasone dipropionate (DIPROSONE) 0 05 % ointment   Topical BID    carBAMazepine (TEGretol) chewable tablet 200 mg  200 mg Oral BID    docusate sodium (COLACE) capsule 100 mg  100 mg Oral BID    haloperidol (HALDOL) tablet 5 mg  5 mg Oral Q6H PRN    haloperidol lactate (HALDOL) injection 5 mg  5 mg Intramuscular Q6H PRN    influenza inactivated quadrivalent vaccine (FLULAVAL) IM injection 0 5 mL  0 5 mL Intramuscular Prior to discharge    magnesium hydroxide (MILK OF MAGNESIA) 400 mg/5 mL oral suspension 30 mL  30 mL Oral Daily PRN    melatonin tablet 6 mg  6 mg Oral HS    nicotine (NICODERM CQ) 21 mg/24 hr TD 24 hr patch 21 mg  21 mg Transdermal Daily    nicotine polacrilex (NICORETTE) gum 4 mg  4 mg Oral Q2H PRN    OLANZapine (ZyPREXA ZYDIS) dispersible tablet 10 mg  10 mg Oral Q3H PRN    OLANZapine (ZyPREXA) IM injection 10 mg  10 mg Intramuscular Q3H PRN    pneumococcal 23-valent polysaccharide vaccine (PNEUMOVAX-23) injection 0 5 mL  0 5 mL Subcutaneous Prior to discharge    polyvinyl alcohol (LIQUIFILM TEARS) 1 4 % ophthalmic solution 1 drop  1 drop Both Eyes Q3H PRN    QUEtiapine (SEROquel) tablet 300 mg  300 mg Oral HS    sulfamethoxazole-trimethoprim (BACTRIM DS) 800-160 mg per tablet 1 tablet  1 tablet Oral Q12H [address]         VTE Pharmacologic Prophylaxis: Reason for no pharmacologic prophylaxis patient is ambulatory  VTE Mechanical Prophylaxis: reason for no mechanical VTE prophylaxis patient is ambulatory    StatSheet

## 2017-12-01 NOTE — PLAN OF CARE
Problem: Alteration in Thoughts and Perception  Goal: Treatment Goal: Gain control of psychotic behaviors/thinking, reduce/eliminate presenting symptoms and demonstrate improved reality functioning upon discharge  Outcome: Not Progressing  Still disorganized and delusional

## 2017-12-01 NOTE — MEDICAL STUDENT
Progress Note - Behavioral Health   Taz Bar 40 y o  female MRN: [id number]  Unit/Bed#: VT8 259-60 Encounter: [id number]    Assessment/Plan   Principal Problem:    Bipolar I disorder, most recent episode mixed, severe with psychotic features ([address] )  Active Problems:    UTI (urinary tract infection)    Polysubstance abuse    Constipation    Continue to monitor for inappropriate behaviors  Add Neurontin 200mg po tid for anxiety  Increase Tegretol to 400mg po in the evenings  Add morning dose of Seroquel 25mg for paranoia  Continue day 2 of 4 Bactrim 800-160mg treatment for UTI      Patient has bipolar I disorder and has been stable for the past 2 years on a low dose of Tegretol  Today, patient is manic  She is having delusions, paranoia and perseverating on ideas of sexual content  She is not having hallucinations, suicidal thoughts or homicidal thoughts  Of note, patient is experiencing dizziness as a side effect of her medications and continues to have difficulty sleeping  Interval History:  Overnight patient had an angry outburst when staff told her the sitting room was closed for curfew  Today patient reports feeling angry with staff and family  She feels staff is treating her poorly and giving her the wrong medications  Her family ended up not visiting last night so patient is upset about not seeing her daughter  This morning she is perseverating on having diabetes and on the possible sexual exploits of her boyfriend  She fixates on stories of a sexual nature calling several people in her life a "whore" and indicating that she herself is  "whore " Patient denies any feelings of suicide or homicide  She is not having any hallucinations but continues to experience paranoia and delusions  She has delusions of people stealing from her, taking advantage of her and her boyfriend cheating on her       Behavior over the last 24 hours:  regressed  Sleep: difficulty remaining asleep through the night  Appetite: normal  Medication side effects: Yes , dizzy and nausea  ROS: sedation    Mental Status Evaluation:  Appearance:  age appropriate and casually dressed   Behavior:  psychomotor agitation   Speech:  loud and pressured   Mood:  anxious, irritable and labile   Affect:  inappropriate, increased in intensity and labile   Thought Process:  flight of ideas   Thought Content:  delusions  obsessive/rumination   Perceptual Disturbances: None   Risk Potential: Suicidal Ideations none, Homicidal Ideations none and Potential for Aggression No   Sensorium:  person, place and year   Cognition:  grossly intact   Consciousness:  alert and awake    Attention: attention span appeared shorter than expected for age   Insight:  limited   Judgment: limited   Gait/Station: normal gait/station and normal balance   Motor Activity: no abnormal movements     Progress Toward Goals: progressing    Recommended Treatment: Continue with group therapy, milieu therapy and occupational therapy  Risks, benefits and possible side effects of Medications:   Risks, benefits, and possible side effects of medications explained to patient and patient verbalizes understanding  Medications: all current active meds have been reviewed      Labs:   Lab Results   Component Value Date    CHOL 156 12/01/2017    CHOL 146 11/27/2017     Lab Results   Component Value Date    HDL 41 12/01/2017    HDL 52 11/27/2017     Lab Results   Component Value Date    LDLCALC 93 12/01/2017    LDLCALC 78 11/27/2017     Lab Results   Component Value Date    TRIG 109 12/01/2017    TRIG 80 11/27/2017     Lab Results   Component Value Date    WBC 7 43 12/01/2017    HGB 12 5 12/01/2017    HCT 39 5 12/01/2017    MCV 90 12/01/2017     12/01/2017     Lab Results   Component Value Date    GLUCOSE 87 12/01/2017    CALCIUM 8 5 12/01/2017     12/01/2017    K 4 1 12/01/2017    CO2 28 12/01/2017     12/01/2017    BUN 11 12/01/2017    CREATININE 0 78 12/01/2017      Urine dipstick shows positive for WBC's  Micro exam: occasional + bacteria      DISPO: Patient is actively manic, discharge pending stabilization of mood

## 2017-12-01 NOTE — PROGRESS NOTES
Patient has been in the milieu most of the evening  She often appears internally preoccupied  She is suspicious and still has some ambivalence about medications  She started shouting and cursing when staff told her sitting room was closing for 11pm curfew  She went to her bedroom where she continued screaming and cursing  She was making accusations about being medication she was allergic to  She doesn't think people here are doing enough to help her and she wants to go to [facility]

## 2017-12-01 NOTE — PLAN OF CARE
Problem: Alteration in Thoughts and Perception  Goal: Verbalize thoughts and feelings  Interventions:  - Promote a nonjudgmental and trusting relationship with the patient through active listening and therapeutic communication  - Assess patient's level of functioning, behavior and potential for risk  - Engage patient in 1 on 1 interactions for a minimum of 15 minutes each session  - Encourage patient to express fears, feelings, frustrations, and discuss symptoms    - [place] patient to reality, help patient recognize reality-based thinking   - Administer medications as ordered and assess for potential side effects  - Provide the patient education related to the signs and symptoms of the illness and desired effects of prescribed medications   Outcome: Progressing    Goal: Refrain from acting on delusional thinking/internal stimuli  Interventions:  - Monitor patient closely, per order   - Utilize least restrictive measures   - Set reasonable limits, give positive feedback for acceptable   - Administer medications as ordered and monitor of potential side effects   Outcome: Progressing    Goal: Agree to be compliant with medication regime, as prescribed and report medication side effects  Interventions:  - Offer appropriate PRN medication and supervise ingestion; conduct aims, as needed    Outcome: Progressing    Goal: Recognize dysfunctional thoughts, communicate reality-based thoughts at the time of discharge  Interventions:  - Provide medication and psycho-education to assist patient in compliance and developing insight into his/her illness    Outcome: Progressing    Goal: Complete daily ADLs, including personal hygiene independently, as able  Interventions:  - Observe, teach, and assist patient with ADLS  - Monitor and promote a balance of rest/activity, with adequate nutrition and elimination    Outcome: Progressing      Problem: DISCHARGE PLANNING  Goal: Discharge to home or other facility with appropriate resources  INTERVENTIONS:  - Identify barriers to discharge w/patient and caregiver  - Arrange for needed discharge resources and transportation as appropriate  - Identify discharge learning needs (meds, wound care, etc )  - Refer to Case Management Department for coordinating discharge planning if the patient needs post-hospital services based on physician/advanced practitioner order or complex needs related to functional status, cognitive ability, or social support system    Outcome: Progressing      Problem: Ineffective Coping  Goal: Participates in unit activities  Interventions:  - Provide therapeutic environment   - Provide required programming   - Redirect inappropriate behaviors    Outcome: Progressing

## 2017-12-01 NOTE — PROGRESS NOTES
Progress Note - [address] 40 y o  female MRN: [id number]   Unit/Bed#: NG4 495-28 Encounter: [id number]    Behavior over the last 24 hours: marcia Morse Doing seems more irritated and labile today  She states that she feels angry at her boyfriend at home "I left a watch on my dresser and I want it back"  Also angry at peers on the unit and more paranoid about peers and staff - thinks people are stealing from her "I am tired of people making stories about me  I don't like people " Vague auditory and visual hallucinations - appears responding to internal stimuli at times  Cursing during the sessio, has pressured, disorganized speech today  Limited participation in milieu  States she has been taking medications      Sleep: decreased  Appetite: normal  Medication side effects: Yes - dizziness   ROS: reports dizziness, denies any shortness of breath or chest pain    Mental Status Evaluation:    Appearance:  casually dressed   Behavior:  cooperative, but easily agitated   Speech:  pressured, tangential, flight of ideas   Mood:  labile, irritable   Affect:  labile   Thought Process:  disorganized, illogical   Associations: tangential associations   Thought Content:  persecutory delusions, sexual preoccupation   Perceptual Disturbances: auditory hallucinations of "people talking aroud me", visual hallucinations of "shadows", appears responding to internal stimuli   Risk Potential: Suicidal ideation - None  Homicidal ideation - Yes, angry at peer and her boyfriend, wants to "hit people back"  Potential for aggression - No   Sensorium:  oriented to person, place and time/date   Memory:  recent and remote memory grossly intact   Consciousness:  alert and awake   Attention: poor concentration and poor attention span   Insight:  poor   Judgment: poor   Gait/Station: normal gait/station and normal balance   Motor Activity: no abnormal movements     Vital signs in last 24 hours:    Temp:  [98 °F (36 7 °C)-98 1 °F (36 7 °C)] 98 1 °F (36 7 °C)  HR:  [] 86  Resp:  [16] 16  BP: (147-163)/() 158/86    Laboratory results:  I have personally reviewed all pertinent laboratory/tests results      UA w Reflex to Microscopic w Reflex to Culture    Collection Time: 11/30/17 12:40 PM   Result Value Ref Range    Color, UA Yellow     Clarity, UA Cloudy     Specific [place], UA 1 008 1 003 - 1 030    pH, UA 7 5 4 5 - 8 0    Leukocytes, UA Large (A) Negative    Nitrite, UA Negative Negative    Protein, UA Negative Negative mg/dl    Glucose, UA Negative Negative mg/dl    Ketones, UA Negative Negative mg/dl    Urobilinogen, UA 1 0 0 2, 1 0 E U /dl E U /dl    Bilirubin, UA Negative Negative    Blood, UA Negative Negative   Urine Microscopic    Collection Time: 11/30/17 12:40 PM   Result Value Ref Range    RBC, UA None Seen None Seen, 0-5 /hpf    WBC, UA 4-10 (A) None Seen, 0-5, 5-55, 5-65 /hpf    Epithelial Cells Occasional None Seen, Occasional /hpf    Bacteria, UA Occasional None Seen, Occasional /hpf    Hyaline Casts, UA None Seen None Seen /lpf   Carbamazepine level, total    Collection Time: 12/01/17  5:39 AM   Result Value Ref Range    Carbamazepine Lvl 5 7 4 0 - 12 0 ug/mL   CBC and differential    Collection Time: 12/01/17  5:39 AM   Result Value Ref Range    WBC 7 43 4 31 - 10 16 Thousand/uL    RBC 4 39 3 81 - 5 12 Million/uL    Hemoglobin 12 5 11 5 - 15 4 g/dL    Hematocrit 39 5 34 8 - 46 1 %    MCV 90 82 - 98 fL    MCH 28 5 26 8 - 34 3 pg    MCHC 31 6 31 4 - 37 4 g/dL    RDW 13 6 11 6 - 15 1 %    MPV 10 7 8 9 - 12 7 fL    Platelets 844 340 - 230 Thousands/uL    nRBC 0 /100 WBCs    Neutrophils Relative 61 43 - 75 %    Lymphocytes Relative 33 14 - 44 %    Monocytes Relative 6 4 - 12 %    Eosinophils Relative 0 0 - 6 %    Basophils Relative 0 0 - 1 %    Neutrophils Absolute 4 44 1 85 - 7 62 Thousands/µL    Lymphocytes Absolute 2 48 0 60 - 4 47 Thousands/µL    Monocytes Absolute 0 45 0 17 - 1 22 Thousand/µL Eosinophils Absolute 0 01 0 00 - 0 61 Thousand/µL    Basophils Absolute 0 02 0 00 - 0 10 Thousands/µL   Comprehensive metabolic panel    Collection Time: 12/01/17  5:39 AM   Result Value Ref Range    Sodium 139 136 - 145 mmol/L    Potassium 4 1 3 5 - 5 3 mmol/L    Chloride 106 100 - 108 mmol/L    CO2 28 21 - 32 mmol/L    Anion Gap 5 4 - 13 mmol/L    BUN 11 5 - 25 mg/dL    Creatinine 0 78 0 60 - 1 30 mg/dL    Glucose 87 65 - 140 mg/dL    Glucose, Fasting 87 65 - 99 mg/dL    Calcium 8 5 8 3 - 10 1 mg/dL    AST 14 5 - 45 U/L    ALT 29 12 - 78 U/L    Alkaline Phosphatase 65 46 - 116 U/L    Total Protein 6 7 6 4 - 8 2 g/dL    Albumin 2 9 (L) 3 5 - 5 0 g/dL    Total Bilirubin 0 24 0 20 - 1 00 mg/dL    eGFR 98 ml/min/1 73sq m   Hemoglobin A1c    Collection Time: 12/01/17  5:39 AM   Result Value Ref Range    Hemoglobin A1C 5 0 4 2 - 6 3 %    EAG 97 mg/dl   Lipid Panel with Direct LDL reflex    Collection Time: 12/01/17  5:39 AM   Result Value Ref Range    Cholesterol 156 50 - 200 mg/dL    Triglycerides 109 <=150 mg/dL    HDL, Direct 41 40 - 60 mg/dL    LDL Calculated 93 0 - 100 mg/dL       Progress Toward Goals: regressed, more labile, slightly less paranoid, reality testing remains poor    Assessment/Plan   Principal Problem:    Bipolar I disorder, most recent episode mixed, severe with psychotic features ([facility])  Active Problems:    Cannabis abuse    UTI (urinary tract infection)    Constipation    Recommended Treatment:     Planned medication and treatment changes: All current active medications have been reviewed    Encourage group therapy, milieu therapy and occupational therapy  Behavioral Health checks every 15 minutes  Increase Tegretol to 200 mg daily and 400 mg in the evening - Tegretol level is low therapeutic at 5 7, will try to keep tegretol level around 10 due to ongoing mood instability  Increase Seroquel to 25 mg bid and 350 mg at bedtime and titrate dose gradually to help with psychotic symptoms  Add Neurontin 100 mg tid and titrate dose to help with agitation  Recheck Tegretol level in 3 days  Continue all other medications:    betamethasone dipropionate  Topical BID   [START ON 12/2/2017] carBAMazepine 200 mg Oral Daily   carBAMazepine 400 mg Oral QPM   docusate sodium 100 mg Oral BID   gabapentin 100 mg Oral TID   [START ON 12/2/2017] gabapentin 200 mg Oral TID   [START ON 12/3/2017] gabapentin 300 mg Oral TID   melatonin 6 mg Oral HS   nicotine 21 mg Transdermal Daily   QUEtiapine 25 mg Oral BID   QUEtiapine 350 mg Oral HS   [START ON 12/2/2017] QUEtiapine 400 mg Oral HS   [START ON 12/2/2017] QUEtiapine 50 mg Oral BID   sulfamethoxazole-trimethoprim 1 tablet Oral Q12H [facility]       Risks / Benefits of Treatment:    Risks, benefits, and possible side effects of medications explained to patient and patient verbalizes understanding and agreement for treatment  Risks of medications in pregnancy explained if female patient  Patient verbalizes understanding and agrees to notify her doctor if she becomes pregnant  Counseling / Coordination of Care: Total floor / unit time spent today 35 minutes  Greater than 50% of total time was spent with the patient and / or family counseling and / or coordination of care  A description of counseling / coordination of care:    Patient's progress discussed with staff in treatment team meeting  Medications, treatment progress and treatment plan reviewed with patient  Medication changes discussed with patient  Coping with need for medication adjustment and ongoing hospitalization reviewed with patient  Educated on importance of medication and treatment compliance  Reoriented to reality and reassured

## 2017-12-02 PROCEDURE — 0HBRXZZ EXCISION OF TOE NAIL, EXTERNAL APPROACH: ICD-10-PCS | Performed by: PSYCHIATRY & NEUROLOGY

## 2017-12-02 RX ADMIN — WATER: 1 INJECTION INTRAMUSCULAR; INTRAVENOUS; SUBCUTANEOUS at 03:53

## 2017-12-02 RX ADMIN — QUETIAPINE FUMARATE 50 MG: 25 TABLET ORAL at 17:06

## 2017-12-02 RX ADMIN — QUETIAPINE FUMARATE 50 MG: 25 TABLET ORAL at 09:09

## 2017-12-02 RX ADMIN — CARBAMAZEPINE 200 MG: 100 TABLET, CHEWABLE ORAL at 09:10

## 2017-12-02 RX ADMIN — CARBAMAZEPINE 400 MG: 100 TABLET, CHEWABLE ORAL at 17:12

## 2017-12-02 RX ADMIN — SULFAMETHOXAZOLE AND TRIMETHOPRIM 1 TABLET: 800; 160 TABLET ORAL at 10:58

## 2017-12-02 RX ADMIN — BETAMETHASONE DIPROPIONATE: 0.5 OINTMENT TOPICAL at 17:12

## 2017-12-02 RX ADMIN — DOCUSATE SODIUM 100 MG: 100 CAPSULE, LIQUID FILLED ORAL at 09:09

## 2017-12-02 RX ADMIN — BETAMETHASONE DIPROPIONATE: 0.5 OINTMENT TOPICAL at 09:16

## 2017-12-02 RX ADMIN — POLYVINYL ALCOHOL 1 DROP: 14 SOLUTION/ DROPS OPHTHALMIC at 17:09

## 2017-12-02 RX ADMIN — GABAPENTIN 200 MG: 100 CAPSULE ORAL at 17:06

## 2017-12-02 RX ADMIN — QUETIAPINE FUMARATE 400 MG: 200 TABLET ORAL at 21:31

## 2017-12-02 RX ADMIN — DOCUSATE SODIUM 100 MG: 100 CAPSULE, LIQUID FILLED ORAL at 17:08

## 2017-12-02 RX ADMIN — MELATONIN TAB 3 MG 6 MG: 3 TAB at 21:30

## 2017-12-02 RX ADMIN — NICOTINE 21 MG: 21 PATCH, EXTENDED RELEASE TRANSDERMAL at 09:17

## 2017-12-02 NOTE — PLAN OF CARE
Alteration in Thoughts and Perception     Agree to be compliant with medication regime, as prescribed and report medication side effects Not Progressing     Recognize dysfunctional thoughts, communicate reality-based thoughts at the time of discharge Not Progressing        Patient suspicious of medication regimen and staff intentions       Alteration in Thoughts and Perception     Treatment Goal: Gain control of psychotic behaviors/thinking, reduce/eliminate presenting symptoms and demonstrate improved reality functioning upon discharge Progressing     Verbalize thoughts and feelings Progressing     Refrain from acting on delusional thinking/internal stimuli Progressing     Complete daily ADLs, including personal hygiene independently, as able Progressing

## 2017-12-02 NOTE — PROGRESS NOTES
Patient has responded well to PRN medication with a decrease in agitation and return to impulse control  Patient is now sleeping peacefully

## 2017-12-02 NOTE — PROGRESS NOTES
Pt cursing at staff demanding a shower  Pt unsteady for a shower at this time  Appears sedated    Was not permitted to take a shower for safety reason

## 2017-12-02 NOTE — CONSULTS
Consult Routine Foot Care- Podiatry   Millicent Benavides 40 y o  female MRN: [id number]  Unit/Bed#: Saman Ayers 882-58 Encounter: [id number]    Assessment/Plan     Assessment:  1  Onychodystrophy  2  Xerosis    Plan:  - Trimmed nails x10 without incidence utilizing a sharp nail nipper  - ordered ammonium lactate for xerosis  - All questions and concerns addressed  - Will discuss this plan with my attending    - Podiatry signing off, thank you for the consult  History of Present Illness     HPI:  Millicent Benavides is a 40 y o  female who presents with elongated toenails and xerosis on b/l feet  States that their nails are painful when elongated  The pressure within their shoe gear is painful and they have been unable to cut their nails adequately  Patient's does not see a podiatrist       Inpatient consult to Podiatry  Performed by: Ivania Ramires  Authorized by: Enrrique Garg         Review of Systems   Constitutional: Negative  HENT: Negative  Eyes: Negative  Respiratory: Negative  Cardiovascular: Negative  Gastrointestinal: Negative  Musculoskeletal: Negative   Skin: elongated toenails, ingrowing left great toenail  Neurological: Negative  Historical Information   Past Medical History:   Diagnosis Date    [facility]     Eczema      History reviewed  No pertinent surgical history    Social History   History   Alcohol Use    1 8 oz/week    2 Glasses of wine, 1 Cans of beer per week     History   Drug Use    Frequency: 1 0 time per week    Types: Marijuana     History   Smoking Status    Current Every Day Smoker    Packs/day: 1 00   Smokeless Tobacco    Never Used     Family History:   Family History   Problem Relation Age of Onset    Depression Mother     Heart disease Father     Hypertension Father        Meds/Allergies   Prescriptions Prior to Admission   Medication    ARIPiprazole (ABILIFY) 15 mg tablet    betamethasone dipropionate (DIPROSONE) 0 05 % ointment    carBAMazepine (TEGretol) 100 mg/5 mL suspension     Allergies   Allergen Reactions    Allopurinol     Lithium     Risperdal [Risperidone]     Valproic Acid And Related        Objective   First Vitals:   Blood Pressure: 131/65 (11/24/17 2249)  Pulse: 104 (11/24/17 2249)  Temperature: 97 8 °F (36 6 °C) (11/24/17 2249)  Temp Source: Oral (11/24/17 2249)  Respirations: 16 (11/24/17 2249)  Height: 5' 5" (165 1 cm) (11/24/17 2249)  Weight - Scale: 95 3 kg (210 lb) (11/24/17 2249)  SpO2: 99 % (11/24/17 2249)    Current Vitals:   Blood Pressure: 128/88 (12/02/17 1058)  Pulse: 104 (12/02/17 1058)  Temperature: 98 1 °F (36 7 °C) (12/02/17 0731)  Temp Source: Oral (12/02/17 0731)  Respirations: 16 (12/02/17 0731)  Height: 5' 5" (165 1 cm) (11/24/17 2249)  Weight - Scale: 95 3 kg (210 lb) (11/24/17 2249)  SpO2: 99 % (11/30/17 0725)        /88   Pulse 104   Temp 98 1 °F (36 7 °C) (Oral)   Resp 16   Ht 5' 5" (1 651 m)   Wt 95 3 kg (210 lb)   SpO2 99%   BMI 34 95 kg/m²     General Appearance:    Alert, cooperative, no distress   Head:    Normocephalic, without obvious abnormality, atraumatic   Eyes:    PERRL, conjunctiva/corneas clear, EOM's intact            Nose:   Moist mucous membranes, no drainage or sinus tenderness   Throat:   No tenderness, no exudates   Neck:   Supple, symmetrical, trachea midline, no JVD   Back:     Symmetric, no CVA tenderness   Lungs:     Clear to auscultation bilaterally, respirations unlabored   Chest wall:    No tenderness or deformity   Heart:    Regular rate and rhythm, S1 and S2 normal, no murmur, rub   or gallop   Abdomen:     Soft, non-tender, bowel sounds active all four quadrants,     no masses, no organomegaly           Extremities:   MMT is 5/5 to all compartments of the LE, +0/4 edema B/L, Digital ROM is intact,    Pulses:   R DP is +2/4, R PT is +2/4, L DP is +2/4, L PT is +2/4, CFT< 3sec to all digits   Skin:   Nails are mildly thickened, elongated  No open Lesions   Skin of the LE is normal texture, turgor  Xerosis on plantar skin (L>R)       Neurologic:   CNII-XII intact  Normal strength, sensation and reflexes       Throughout  Gross sensation is intact   Protective sensation is intact           Lab Results:   Admission on 11/24/2017   Component Date Value    WBC 11/25/2017 10 46*    RBC 11/25/2017 4 14     Hemoglobin 11/25/2017 12 3     Hematocrit 11/25/2017 37 6     MCV 11/25/2017 91     MCH 11/25/2017 29 7     MCHC 11/25/2017 32 7     RDW 11/25/2017 14 1     MPV 11/25/2017 10 7     Platelets 86/84/7912 251     nRBC 11/25/2017 0     Neutrophils Relative 11/25/2017 71     Lymphocytes Relative 11/25/2017 23     Monocytes Relative 11/25/2017 6     Eosinophils Relative 11/25/2017 0     Basophils Relative 11/25/2017 0     Neutrophils Absolute 11/25/2017 7 42     Lymphocytes Absolute 11/25/2017 2 36     Monocytes Absolute 11/25/2017 0 65     Eosinophils Absolute 11/25/2017 0 00     Basophils Absolute 11/25/2017 0 01     Sodium 11/25/2017 142     Potassium 11/25/2017 3 9     Chloride 11/25/2017 108     CO2 11/25/2017 29     Anion Gap 11/25/2017 5     BUN 11/25/2017 9     Creatinine 11/25/2017 0 68     Glucose 11/25/2017 99     Glucose, Fasting 11/25/2017 99     Calcium 11/25/2017 8 4     AST 11/25/2017 13     ALT 11/25/2017 22     Alkaline Phosphatase 11/25/2017 59     Total Protein 11/25/2017 6 0*    Albumin 11/25/2017 3 1*    Total Bilirubin 11/25/2017 0 33     eGFR 11/25/2017 113     Amph/Meth UR 11/25/2017 Negative     Barbiturate Ur 11/25/2017 Negative     Benzodiazepine Urine 11/25/2017 Negative     Cocaine Urine 11/25/2017 Negative     Methadone Urine 11/25/2017 Negative     Opiate Urine 11/25/2017 Negative     PCP Ur 11/25/2017 Negative     THC Urine 11/25/2017 Negative     POC Glucose 11/25/2017 96     Ethanol Lvl 51/08/6806 <3     Salicylate Lvl 12/70/5442 <3*    Acetaminophen Level 11/25/2017 <2*    Cholesterol 11/27/2017 146  Triglycerides 11/27/2017 80     HDL, Direct 11/27/2017 52     LDL Calculated 11/27/2017 78     Sodium 11/27/2017 142     Potassium 11/27/2017 4 1     Chloride 11/27/2017 108     CO2 11/27/2017 28     Anion Gap 11/27/2017 6     BUN 11/27/2017 8     Creatinine 11/27/2017 0 70     Glucose 11/27/2017 89     Glucose, Fasting 11/27/2017 89     Calcium 11/27/2017 8 5     AST 11/27/2017 14     ALT 11/27/2017 22     Alkaline Phosphatase 11/27/2017 55     Total Protein 11/27/2017 6 3*    Albumin 11/27/2017 2 8*    Total Bilirubin 11/27/2017 0 31     eGFR 11/27/2017 112     WBC 11/27/2017 7 33     RBC 11/27/2017 4 07     Hemoglobin 11/27/2017 11 9     Hematocrit 11/27/2017 36 8     MCV 11/27/2017 90     MCH 11/27/2017 29 2     MCHC 11/27/2017 32 3     RDW 11/27/2017 14 0     MPV 11/27/2017 10 6     Platelets 65/29/1258 242     nRBC 11/27/2017 0     Neutrophils Relative 11/27/2017 67     Lymphocytes Relative 11/27/2017 24     Monocytes Relative 11/27/2017 9     Eosinophils Relative 11/27/2017 0     Basophils Relative 11/27/2017 0     Neutrophils Absolute 11/27/2017 4 89     Lymphocytes Absolute 11/27/2017 1 73     Monocytes Absolute 11/27/2017 0 69     Eosinophils Absolute 11/27/2017 0 00     Basophils Absolute 11/27/2017 0 01     Preg, Serum 11/27/2017 Negative     Folate 11/27/2017 12 4     T3, Total 11/27/2017 1 00     TSH 3RD GENERATON 11/27/2017 0 678     Vitamin B-12 11/27/2017 320     Color, UA 11/28/2017 Yellow     Clarity, UA 11/28/2017 Cloudy     Specific Gravity, UA 11/28/2017 1 015     pH, UA 11/28/2017 8 0     Leukocytes, UA 11/28/2017 Large*    Nitrite, UA 11/28/2017 Negative     Protein, UA 11/28/2017 Negative     Glucose, UA 11/28/2017 Negative     Ketones, UA 11/28/2017 Negative     Urobilinogen, UA 11/28/2017 1 0     Bilirubin, UA 11/28/2017 Negative     Blood, UA 11/28/2017 Negative     RBC, UA 11/28/2017 None Seen     WBC, UA 11/28/2017 10-20*  Epithelial Cells 11/28/2017 Moderate*    Bacteria, UA 11/28/2017 Occasional     Hyaline Casts, UA 11/28/2017 None Seen     Urine Culture 11/29/2017 20,000-29,000 cfu/ml      Carbamazepine Lvl 11/29/2017 4 3     Color, UA 11/30/2017 Yellow     Clarity, UA 11/30/2017 Cloudy     Specific Gravity, UA 11/30/2017 1 008     pH, UA 11/30/2017 7 5     Leukocytes, UA 11/30/2017 Large*    Nitrite, UA 11/30/2017 Negative     Protein, UA 11/30/2017 Negative     Glucose, UA 11/30/2017 Negative     Ketones, UA 11/30/2017 Negative     Urobilinogen, UA 11/30/2017 1 0     Bilirubin, UA 11/30/2017 Negative     Blood, UA 11/30/2017 Negative     RBC, UA 11/30/2017 None Seen     WBC, UA 11/30/2017 4-10*    Epithelial Cells 11/30/2017 Occasional     Bacteria, UA 11/30/2017 Occasional     Hyaline Casts, UA 11/30/2017 None Seen     Carbamazepine Lvl 12/01/2017 5 7     WBC 12/01/2017 7 43     RBC 12/01/2017 4 39     Hemoglobin 12/01/2017 12 5     Hematocrit 12/01/2017 39 5     MCV 12/01/2017 90     MCH 12/01/2017 28 5     MCHC 12/01/2017 31 6     RDW 12/01/2017 13 6     MPV 12/01/2017 10 7     Platelets 94/38/6694 297     nRBC 12/01/2017 0     Neutrophils Relative 12/01/2017 61     Lymphocytes Relative 12/01/2017 33     Monocytes Relative 12/01/2017 6     Eosinophils Relative 12/01/2017 0     Basophils Relative 12/01/2017 0     Neutrophils Absolute 12/01/2017 4 44     Lymphocytes Absolute 12/01/2017 2 48     Monocytes Absolute 12/01/2017 0 45     Eosinophils Absolute 12/01/2017 0 01     Basophils Absolute 12/01/2017 0 02     Sodium 12/01/2017 139     Potassium 12/01/2017 4 1     Chloride 12/01/2017 106     CO2 12/01/2017 28     Anion Gap 12/01/2017 5     BUN 12/01/2017 11     Creatinine 12/01/2017 0 78     Glucose 12/01/2017 87     Glucose, Fasting 12/01/2017 87     Calcium 12/01/2017 8 5     AST 12/01/2017 14     ALT 12/01/2017 29     Alkaline Phosphatase 12/01/2017 65     Total Protein 12/01/2017 6 7     Albumin 12/01/2017 2 9*    Total Bilirubin 12/01/2017 0 24     eGFR 12/01/2017 98     Hemoglobin A1C 12/01/2017 5 0     EAG 12/01/2017 97     Cholesterol 12/01/2017 156     Triglycerides 12/01/2017 109     HDL, Direct 12/01/2017 41     LDL Calculated 12/01/2017 93      Imaging: I have personally reviewed pertinent films in PACS  EKG, Pathology, and Other Studies: I have personally reviewed pertinent reports        Code Status: Level 1 - Full Code  Advance Directive and Living Will:      Power of :    POLST:

## 2017-12-02 NOTE — PROGRESS NOTES
Patient has now been given all HS meds with the exception of Seroquel since she had Zyprexa 10mgs IM

## 2017-12-02 NOTE — PROGRESS NOTES
Patient started screaming, shouting, cursing at staff  She is shouting that a staff member who is here now was here this morning(not true) and when she told staff that she had thrown up the staff member said "I don't give a shit " She is accusing RN of calling her a whore, accusing staff of not taking care of medical illnesses which do not exist and generally verbally aggressive and abusive  She refuses to take her HS medications, she is paranoid about staff and is not following verbal direction  She is escalating in agitation and security are present on the floor  Zyprexa 10mgs IM given

## 2017-12-02 NOTE — PROGRESS NOTES
Patient irritable and demanding this morning but calmed with supportive   Denied SI and HI but is preoccupied with multiple physical concerns she wants addressed: a podiatry consult for her feet (possible fungal infection), wants her prn Zofran d/cd bc it made her feel like she was going to have a "heart attack", wants a mole removed  She declined to take her Gabapentin until she had education on it, but when education was provided she said she'd prefer to talk to the doctor first  Cooperative with other medications  Says staff spoke to her rudely yesterday when she was reporting having vomited  Reported nausea later in the morning but did not want prn medication--drank Ginger Ale and ate she crackers instead  Present in the milieu

## 2017-12-03 RX ORDER — GABAPENTIN 100 MG/1
200 CAPSULE ORAL 3 TIMES DAILY
Status: DISCONTINUED | OUTPATIENT
Start: 2017-12-03 | End: 2017-12-04

## 2017-12-03 RX ORDER — AMMONIUM LACTATE 12 G/100G
CREAM TOPICAL 2 TIMES DAILY PRN
Status: DISCONTINUED | OUTPATIENT
Start: 2017-12-03 | End: 2017-12-15 | Stop reason: HOSPADM

## 2017-12-03 RX ADMIN — DOCUSATE SODIUM 100 MG: 100 CAPSULE, LIQUID FILLED ORAL at 09:03

## 2017-12-03 RX ADMIN — CARBAMAZEPINE 200 MG: 100 TABLET, CHEWABLE ORAL at 09:04

## 2017-12-03 RX ADMIN — CARBAMAZEPINE 400 MG: 100 TABLET, CHEWABLE ORAL at 17:03

## 2017-12-03 RX ADMIN — GABAPENTIN 300 MG: 300 CAPSULE ORAL at 09:07

## 2017-12-03 RX ADMIN — GABAPENTIN 300 MG: 300 CAPSULE ORAL at 16:09

## 2017-12-03 RX ADMIN — GABAPENTIN 200 MG: 100 CAPSULE ORAL at 20:29

## 2017-12-03 RX ADMIN — Medication: at 18:03

## 2017-12-03 RX ADMIN — DOCUSATE SODIUM 100 MG: 100 CAPSULE, LIQUID FILLED ORAL at 17:05

## 2017-12-03 RX ADMIN — BETAMETHASONE DIPROPIONATE: 0.5 OINTMENT TOPICAL at 17:03

## 2017-12-03 RX ADMIN — BETAMETHASONE DIPROPIONATE: 0.5 OINTMENT TOPICAL at 09:10

## 2017-12-03 RX ADMIN — QUETIAPINE FUMARATE 50 MG: 25 TABLET ORAL at 16:10

## 2017-12-03 RX ADMIN — NICOTINE 21 MG: 21 PATCH, EXTENDED RELEASE TRANSDERMAL at 09:11

## 2017-12-03 RX ADMIN — QUETIAPINE FUMARATE 50 MG: 25 TABLET ORAL at 09:04

## 2017-12-03 RX ADMIN — QUETIAPINE FUMARATE 400 MG: 200 TABLET ORAL at 21:08

## 2017-12-03 NOTE — PROGRESS NOTES
Progress Note - Behavioral Health   Georgina Villafuerte 40 y o  female MRN: [id number]  Unit/Bed#: FK3 518-76 Encounter: [id number]    Assessment/Plan   Principal Problem:    Bipolar I disorder, most recent episode mixed, severe with psychotic features ([address] )  Active Problems:    UTI (urinary tract infection)    Cannabis abuse    Constipation      Behavior over the last 24 hours:  unchanged  Sleep: insomnia  Appetite: increased  Medication side effects: No  ROS: paranoid    Mental Status Evaluation:  Appearance:  age appropriate and casually dressed   Behavior:  cooperative   Speech:  pressured   Mood:  anxious and labile   Affect:  labile   Thought Process:  circumstantial   Thought Content:  delusions  persecutory   Perceptual Disturbances: None   Risk Potential: Suicidal Ideations none, Homicidal Ideations none and Potential for Aggression No   Sensorium:  person, place and time/date   Cognition:  grossly intact   Consciousness:  alert and awake    Attention: attention span appeared shorter than expected for age   Insight:  limited   Judgment: limited   Gait/Station: normal gait/station and normal balance   Motor Activity: no abnormal movements     Progress Toward Goals: Patient is still very irritable, pressured and paranoid  She stated she will take Tegretol and Seroquel but wants Melatonin d/c due to nightmares and she wants to be tapered off Gabapentin because she feels too sedated  Recommended Treatment: Continue with group therapy, milieu therapy and occupational therapy  Risks, benefits and possible side effects of Medications:   Risks, benefits, and possible side effects of medications explained to patient and patient verbalizes understanding  Medications: all current active meds have been reviewed  Labs: reviewed    Counseling / Coordination of Care  Total floor / unit time spent today n/a minutes   Greater than 50% of total time was spent with the patient and / or family counseling and / or coordination of care   A description of the counseling / coordination of care:

## 2017-12-03 NOTE — PROGRESS NOTES
Staff reported patient was rude and disrespectful with them this morning; limits were set and patient reportedly apologized  Patient demanding with RN, declining meds at different times at demanding them at others  Ultimately took all her scheduled medications though  Scant in terms of answering RN's questions--only said she feels "better", slept "OK," and denied SI and HI

## 2017-12-03 NOTE — PROGRESS NOTES
Pt irritable, verbally abusive at times because she feels she is being over medicated by these " stupid doctors "  Pt was on phone earlier at someone but refused to tell writer who she was speaking to    She stated "  These medications are making me lose my sight and no one gives a shit, especially these doctors and nurse who only enjoy laughing at me "

## 2017-12-03 NOTE — PROGRESS NOTES
Pt angry, irritable and verbally abusive  Pt yelling that she is on too many medications and that the Dr should listen to her and cut off all her medications  Pt denies SI, Hi, denies AVH presently

## 2017-12-04 LAB — CARBAMAZEPINE SERPL-MCNC: 8.4 UG/ML (ref 4–12)

## 2017-12-04 PROCEDURE — 80156 ASSAY CARBAMAZEPINE TOTAL: CPT | Performed by: PSYCHIATRY & NEUROLOGY

## 2017-12-04 RX ORDER — PROMETHAZINE HYDROCHLORIDE 25 MG/1
25 TABLET ORAL 3 TIMES DAILY PRN
Status: DISCONTINUED | OUTPATIENT
Start: 2017-12-04 | End: 2017-12-15 | Stop reason: HOSPADM

## 2017-12-04 RX ORDER — QUETIAPINE FUMARATE 200 MG/1
200 TABLET, FILM COATED ORAL EVERY MORNING
Status: DISCONTINUED | OUTPATIENT
Start: 2017-12-05 | End: 2017-12-15 | Stop reason: HOSPADM

## 2017-12-04 RX ORDER — GABAPENTIN 100 MG/1
200 CAPSULE ORAL 2 TIMES DAILY
Status: DISCONTINUED | OUTPATIENT
Start: 2017-12-04 | End: 2017-12-05

## 2017-12-04 RX ADMIN — BETAMETHASONE DIPROPIONATE: 0.5 OINTMENT TOPICAL at 09:01

## 2017-12-04 RX ADMIN — Medication: at 19:37

## 2017-12-04 RX ADMIN — QUETIAPINE FUMARATE 50 MG: 25 TABLET ORAL at 08:55

## 2017-12-04 RX ADMIN — DOCUSATE SODIUM 100 MG: 100 CAPSULE, LIQUID FILLED ORAL at 08:54

## 2017-12-04 RX ADMIN — GABAPENTIN 200 MG: 100 CAPSULE ORAL at 17:44

## 2017-12-04 RX ADMIN — DOCUSATE SODIUM 100 MG: 100 CAPSULE, LIQUID FILLED ORAL at 17:44

## 2017-12-04 RX ADMIN — CARBAMAZEPINE 200 MG: 100 TABLET, CHEWABLE ORAL at 08:54

## 2017-12-04 RX ADMIN — BETAMETHASONE DIPROPIONATE: 0.5 OINTMENT TOPICAL at 19:35

## 2017-12-04 RX ADMIN — CARBAMAZEPINE 400 MG: 100 TABLET, CHEWABLE ORAL at 19:33

## 2017-12-04 RX ADMIN — NICOTINE 21 MG: 21 PATCH, EXTENDED RELEASE TRANSDERMAL at 08:56

## 2017-12-04 RX ADMIN — Medication: at 13:21

## 2017-12-04 RX ADMIN — QUETIAPINE FUMARATE 400 MG: 200 TABLET ORAL at 21:49

## 2017-12-04 NOTE — PROGRESS NOTES
Patient in sitting room shouting and accusing a peer of saying her daughter's name Amy and being responsible for stopping her families calls from getting through to her  Labile most of the evening  Tried to call her boyfriends mother to "aplologize" but the individual would not accept her call

## 2017-12-04 NOTE — PROGRESS NOTES
Progress Note - [facility] 40 y o  female MRN: [id number]  Unit/Bed#: SS3 025-72 Encounter: [id number]    Joshua Arroyo was seen for continuing care and reviewed with treatment team   Pt reported feeling "Better" today and misses her boyfriend and kids  She wants to go home  She states Gabapentin and "Zofran, or a Z drug" are making her angry  She does not want "To become aggressive "  She wants to continue Tegretol and Seroquel--feels she does well on these  She also does not want middle of the day doses of her medicines  She presently denies SI, HI, depression, anxiety, or psychotic Sxs  Floor team relays she has c/o the same issues to staff and remains labile and yesterday she felt that the staff was laughing at her  Sleep: Good  Appetite: Good  Medication side effects: as per HPI  ROS: None per Pt    Labs/Head Ct: Reviewed  Today's Carbamazepine level 8 4    Mental Status Evaluation:  Appearance:  Dressed, clean, good eye contact   Behavior:  Cooperative, a bit agitated but overall calm   Speech:  Clear, normal rate and volume, Pressured, Somewhat loud at times    Mood:  Labile   Affect:  Labile   Thought Process:  Circumstantial   Associations: circumstantial associations   Thought Content:  persecutory delusions   Perceptual Disturbances: Pt denies any Sxs, but appears paranoid  She does not appear to be responding to internal stimuli   Risk Potential: Pt presently denies SI or HI    Sensorium:  Person, Place, Day of the week, Month, Year, Self   Memory:  short term memory grossly intact   Consciousness:  alert, awake   Attention: Fair   Insight:  Poor   Judgment: Poor   Gait/Station: Normal gait/station   Motor Activity: No abnormal movements     Vitals:    12/04/17 0715   BP: 133/62   Pulse: 99   Resp: 16   Temp: 98 2 °F (36 8 °C)   SpO2:        Progress Toward Goals:  No apparent progress and will increase Quetiapine for mood and psychosis    Reduce Gabapentin to bid to wean potentially off this medicine as Pt is resistant to taking it  No Zofran on med list   I will give Promethazine 25mg tid prn anxiety--Pt accepted this  Get RPR  Assessment/Plan   Principal Problem:    Bipolar I disorder, most recent episode mixed, severe with psychotic features ([facility] [address] )  Active Problems:    UTI (urinary tract infection)    Cannabis abuse    Constipation      Recommended Treatment: Continue with pharmacotherapy, group therapy, milieu therapy and occupational therapy  The patient will be maintained on the following medications:    betamethasone dipropionate  Topical BID   carBAMazepine 200 mg Oral Daily   carBAMazepine 400 mg Oral QPM   docusate sodium 100 mg Oral BID   gabapentin 200 mg Oral BID   nicotine 21 mg Transdermal Daily   [START ON 12/5/2017] QUEtiapine 200 mg Oral QAM   QUEtiapine 400 mg Oral HS       Risks, benefits and possible side effects of Medications:    Risks, benefits, and possible side effects of medications explained to patient and patient verbalizes understanding  Risks of medications in pregnancy explained if female patient  Patient verbalizes understanding and agrees to notify her doctor if she becomes pregnant

## 2017-12-04 NOTE — MEDICAL STUDENT
Progress Note - Behavioral Health   Monae Daley 40 y o  female MRN: [id number]  Unit/Bed#: HN6 249-07 Encounter: [id number]    Assessment/Plan   Principal Problem:    Bipolar I disorder, most recent episode mixed, severe with psychotic features ([facility] [address] )  Active Problems:    UTI (urinary tract infection)    Cannabis abuse    Constipation     Patient has Bipolar I Disorder and is currently in a manic state  She has paranoia, delusions, and disorganized thoughts  She is emotionally labile, verbally abusive towards staff and has increased talkativeness with pressured speech  Patient's family has agreed to allow her to return home upon discharge  She is currently not suicidal or homicidal and not having any hallucinations  Plan:  1 ) Continue psychotropic medications    Interval History  Over the weekend, Gaylon Fleischer has had escalating agitation with verbally abusive language toward staff  Security was called at one point due to her angry outbursts  Podiatry came to trim her toe nails and prescribed Ammonium Lactate for her Xerosis on her left foot  She has been refusing her Gabapentin because she feels overly medicated and tired during the day  She prefers to take her medication twice a day versus three times a day  She continues to experience dizziness and at times blurry vision  She attributes this to having "gestational diabetes" and is requesting a pregnancy test  She continues to feel paranoid about other patients and staff members  She feels that staff members are laughing at her and other patients wanting to touch her inappropriately  She denies suicidal thoughts, plans or intent  She is not having hallucinations or homicidal thoughts  She feels depressed due to the recent death of her sister by drug overdose and from not being able to go home  She has anxiety over the delusions of her boyfriend stealing from her and cheating on her      Behavior over the last 24 hours:  unchanged  Sleep: decreased, early awakening  Appetite: increased  Medication side effects: Yes, drowsy, heart palpitations with Zofran  ROS: dizzy    Mental Status Evaluation:  Appearance:  age appropriate and casually dressed   Behavior:  psychomotor agitation   Speech:  loud, pressured and tangential   Mood:  anxious, irritable and labile   Affect:  normal and broad   Thought Process:  perserverative   Thought Content:  delusions  obsessive/rumination   Perceptual Disturbances: None   Risk Potential: Suicidal Ideations none, Homicidal Ideations none and Potential for Aggression No   Sensorium:  person, place, time/date and year   Cognition:  grossly intact   Consciousness:  alert and awake    Attention: attention span appeared shorter than expected for age   Insight:  limited   Judgment: limited   Gait/Station: normal gait/station and normal balance   Motor Activity: no abnormal movements     Progress Toward Goals: progressing    Recommended Treatment: Continue with group therapy, milieu therapy and occupational therapy  Risks, benefits and possible side effects of Medications:   Risks, benefits, and possible side effects of medications explained to patient and patient verbalizes understanding  Medications: all current active meds have been reviewed      Labs:   Carbamazepine level: 8 4

## 2017-12-04 NOTE — PROGRESS NOTES
Patient labile and demanding of staff  Declined her neurontin this morning and said she wants it discontinued because she's afraid it will make her aggressive and she doesn't want to get in trouble by becoming violent  She also said she only wants to take meds in the morning or at night--not in the "middle of the day" because she's said she's been "over medicated" here  Denied any other needs or symptoms   Said she's "ready to go home "

## 2017-12-04 NOTE — CASE MANAGEMENT
SW rec'd call from Yazan Apolinarjeff [place], [phone]  Wanted to meet with pt, pt's , & SW to discuss pt's progress, D/C, etc  CATERINA said pt's dr would probably not be available, but she could meet with SW to discuss above  She said she could come on Wed, 12/6 @ 1 PM  SW agreed

## 2017-12-05 LAB — RPR SER QL: NORMAL

## 2017-12-05 PROCEDURE — 86592 SYPHILIS TEST NON-TREP QUAL: CPT | Performed by: PHYSICIAN ASSISTANT

## 2017-12-05 RX ORDER — ACETAMINOPHEN 325 MG/1
650 TABLET ORAL EVERY 6 HOURS PRN
Status: DISCONTINUED | OUTPATIENT
Start: 2017-12-05 | End: 2017-12-15 | Stop reason: HOSPADM

## 2017-12-05 RX ORDER — IBUPROFEN 600 MG/1
600 TABLET ORAL EVERY 6 HOURS PRN
Status: DISCONTINUED | OUTPATIENT
Start: 2017-12-05 | End: 2017-12-15 | Stop reason: HOSPADM

## 2017-12-05 RX ORDER — GABAPENTIN 300 MG/1
300 CAPSULE ORAL 2 TIMES DAILY
Status: DISCONTINUED | OUTPATIENT
Start: 2017-12-05 | End: 2017-12-15 | Stop reason: HOSPADM

## 2017-12-05 RX ORDER — ACETAMINOPHEN 325 MG/1
975 TABLET ORAL EVERY 6 HOURS PRN
Status: DISCONTINUED | OUTPATIENT
Start: 2017-12-05 | End: 2017-12-15 | Stop reason: HOSPADM

## 2017-12-05 RX ADMIN — QUETIAPINE FUMARATE 200 MG: 200 TABLET ORAL at 08:12

## 2017-12-05 RX ADMIN — GABAPENTIN 200 MG: 100 CAPSULE ORAL at 08:12

## 2017-12-05 RX ADMIN — CARBAMAZEPINE 200 MG: 100 TABLET, CHEWABLE ORAL at 08:12

## 2017-12-05 RX ADMIN — GABAPENTIN 300 MG: 300 CAPSULE ORAL at 17:09

## 2017-12-05 RX ADMIN — NICOTINE 21 MG: 21 PATCH, EXTENDED RELEASE TRANSDERMAL at 08:12

## 2017-12-05 RX ADMIN — QUETIAPINE FUMARATE 500 MG: 300 TABLET ORAL at 21:14

## 2017-12-05 RX ADMIN — DOCUSATE SODIUM 100 MG: 100 CAPSULE, LIQUID FILLED ORAL at 08:12

## 2017-12-05 RX ADMIN — CARBAMAZEPINE 400 MG: 100 TABLET, CHEWABLE ORAL at 17:07

## 2017-12-05 RX ADMIN — DOCUSATE SODIUM 100 MG: 100 CAPSULE, LIQUID FILLED ORAL at 17:07

## 2017-12-05 RX ADMIN — BETAMETHASONE DIPROPIONATE: 0.5 OINTMENT TOPICAL at 08:45
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
## 2017-12-05 NOTE — PROGRESS NOTES
Progress Note - [address] [facility] [address] 40 y o  female MRN: [id number]   Unit/Bed#: VW3 438-03 Encounter: [id number]    Behavior over the last 24 hours: minimal improvement  Joshua Arroyo states she feels better and is cooperative with session, but she still has delusional thoughts and tangential, pressured speech  Thought yesterday that people were talking on the phone about her daughter and was shouting at peers  Poor insight, focusing on discharge  Compliant with medications today, but refused some of her Neurontin doses yesterday  Attends groups  Sleep: slept better  Appetite: improved  Medication side effects: No   ROS: reports headache, denies any shortness of breath or chest pain    Mental Status Evaluation:    Appearance:  casually dressed   Behavior:  cooperative   Speech:  pressured, tangential   Mood:  labile   Affect:  labile   Thought Process:  tangential   Associations: tangential associations   Thought Content:  persecutory delusions   Perceptual Disturbances: denies auditory or visual hallucinations when asked, but appears distracted   Risk Potential: Suicidal ideation - None  Homicidal ideation - None  Potential for aggression - No   Sensorium:  oriented to person, place and time/date   Memory:  recent and remote memory grossly intact   Consciousness:  alert and awake   Attention: attention span and concentration appear shorter than expected for age   Insight:  impaired   Judgment: impaired   Gait/Station: normal gait/station and normal balance   Motor Activity: no abnormal movements     Vital signs in last 24 hours:    Temp:  [98 1 °F (36 7 °C)] 98 1 °F (36 7 °C)  HR:  [] 80  Resp:  [16-18] 18  BP: (114-159)/(68-89) 114/68    Laboratory results:  I have personally reviewed all pertinent laboratory/tests results      Tegretol:   Lab Results   Component Value Date    CARBAMAZEPIN 8 4 12/04/2017       Progress Toward Goals: minimal progress, still labile, insight remains poor, still has psychotic symptoms    Assessment/Plan   Principal Problem:    Bipolar I disorder, most recent episode mixed, severe with psychotic features ([facility] [address] )  Active Problems:    Cannabis abuse    UTI (urinary tract infection)    Constipation    Recommended Treatment:     Planned medication and treatment changes: All current active medications have been reviewed  Encourage group therapy, milieu therapy and occupational therapy  Behavioral Health checks every 15 minutes  Increase Neurontin to 300 mg bid  Increase Seroquel to 200 mg daily and 500 mg at bedtime and titrate to 800 mg per day  May need addition of Haldol is she remains psychotic despite maximum Seroquel dose    Continue all other medications:      betamethasone dipropionate  Topical BID   carBAMazepine 200 mg Oral Daily   carBAMazepine 400 mg Oral QPM   docusate sodium 100 mg Oral BID   gabapentin 300 mg Oral BID   nicotine 21 mg Transdermal Daily   QUEtiapine 200 mg Oral QAM   QUEtiapine 400 mg Oral HS       Risks / Benefits of Treatment:    Risks, benefits, and possible side effects of medications explained to patient and patient verbalizes understanding and agreement for treatment  Risks of medications in pregnancy explained if female patient  Patient verbalizes understanding and agrees to notify her doctor if she becomes pregnant  Counseling / Coordination of Care:    Patient's progress discussed with staff in treatment team meeting  Medications, treatment progress and treatment plan reviewed with patient  Medication changes discussed with patient

## 2017-12-05 NOTE — PROGRESS NOTES
Patient denies all symptoms and has been out in milieu interacting well with both patients and staff  Patient took her medications without difficulty and calm and cooperative on the unit  I discussed with her the importance of staying away from negativity because she has issues with another female patient   Patient stated, "I will stay away from negative people and concern myself with my issues and getting out of here this week "

## 2017-12-05 NOTE — PLAN OF CARE
Problem: Alteration in Thoughts and Perception  Goal: Treatment Goal: Gain control of psychotic behaviors/thinking, reduce/eliminate presenting symptoms and demonstrate improved reality functioning upon discharge  Outcome: Progressing    Goal: Verbalize thoughts and feelings  Interventions:  - Promote a nonjudgmental and trusting relationship with the patient through active listening and therapeutic communication  - Assess patient's level of functioning, behavior and potential for risk  - Engage patient in 1 on 1 interactions for a minimum of 15 minutes each session  - Encourage patient to express fears, feelings, frustrations, and discuss symptoms    - [place] patient to reality, help patient recognize reality-based thinking   - Administer medications as ordered and assess for potential side effects  - Provide the patient education related to the signs and symptoms of the illness and desired effects of prescribed medications   Outcome: Progressing    Goal: Refrain from acting on delusional thinking/internal stimuli  Interventions:  - Monitor patient closely, per order   - Utilize least restrictive measures   - Set reasonable limits, give positive feedback for acceptable   - Administer medications as ordered and monitor of potential side effects   Outcome: Progressing    Goal: Agree to be compliant with medication regime, as prescribed and report medication side effects  Interventions:  - Offer appropriate PRN medication and supervise ingestion; conduct aims, as needed    Outcome: Progressing    Goal: Recognize dysfunctional thoughts, communicate reality-based thoughts at the time of discharge  Interventions:  - Provide medication and psycho-education to assist patient in compliance and developing insight into his/her illness    Outcome: Progressing    Goal: Complete daily ADLs, including personal hygiene independently, as able  Interventions:  - Observe, teach, and assist patient with ADLS  - Monitor and promote a balance of rest/activity, with adequate nutrition and elimination    Outcome: Progressing      Problem: DISCHARGE PLANNING  Goal: Discharge to home or other facility with appropriate resources  INTERVENTIONS:  - Identify barriers to discharge w/patient and caregiver  - Arrange for needed discharge resources and transportation as appropriate  - Identify discharge learning needs (meds, wound care, etc )  - Refer to Case Management Department for coordinating discharge planning if the patient needs post-hospital services based on physician/advanced practitioner order or complex needs related to functional status, cognitive ability, or social support system   Goal extended 12/1/17    Outcome: Progressing      Problem: Ineffective Coping  Goal: Participates in unit activities  Interventions:  - Provide therapeutic environment   - Provide required programming   - Redirect inappropriate behaviors    Outcome: Progressing

## 2017-12-05 NOTE — PLAN OF CARE
Problem: Alteration in Thoughts and Perception  Goal: Treatment Goal: Gain control of psychotic behaviors/thinking, reduce/eliminate presenting symptoms and demonstrate improved reality functioning upon discharge  Outcome: Progressing    Goal: Verbalize thoughts and feelings  Interventions:  - Promote a nonjudgmental and trusting relationship with the patient through active listening and therapeutic communication  - Assess patient's level of functioning, behavior and potential for risk  - Engage patient in 1 on 1 interactions for a minimum of 15 minutes each session  - Encourage patient to express fears, feelings, frustrations, and discuss symptoms    - [place] patient to reality, help patient recognize reality-based thinking   - Administer medications as ordered and assess for potential side effects  - Provide the patient education related to the signs and symptoms of the illness and desired effects of prescribed medications   Outcome: Progressing    Goal: Refrain from acting on delusional thinking/internal stimuli  Interventions:  - Monitor patient closely, per order   - Utilize least restrictive measures   - Set reasonable limits, give positive feedback for acceptable   - Administer medications as ordered and monitor of potential side effects   Outcome: Progressing    Goal: Agree to be compliant with medication regime, as prescribed and report medication side effects  Interventions:  - Offer appropriate PRN medication and supervise ingestion; conduct aims, as needed    Outcome: Progressing    Goal: Recognize dysfunctional thoughts, communicate reality-based thoughts at the time of discharge  Interventions:  - Provide medication and psycho-education to assist patient in compliance and developing insight into his/her illness    Outcome: Progressing    Goal: Complete daily ADLs, including personal hygiene independently, as able  Interventions:  - Observe, teach, and assist patient with ADLS  - Monitor and promote a balance of rest/activity, with adequate nutrition and elimination    Outcome: Progressing

## 2017-12-05 NOTE — PROGRESS NOTES
Pt irritable,liable and swearing at staff because she did not get double portion for dinner like other patients  Pt was calling staff " f bitches, whores and bunch of lesbians and homosexuals "  Pt had to be told many times about her verbal abuse towards staff but kept on going  Pt then stated to talk about the Dr as being an idiot and just trying to keep her drugged up  Pt denies SI, HI and baron AVH

## 2017-12-05 NOTE — MEDICAL STUDENT
Progress Note - Behavioral Health   Tatiana [place] 40 y o  female MRN: [id number]  Unit/Bed#: BH6 014-60 Encounter: [id number]    Assessment/Plan   Principal Problem:    Bipolar I disorder, most recent episode mixed, severe with psychotic features ([address] )  Active Problems:    UTI (urinary tract infection)    Cannabis abuse    Constipation    39 yo female with bipolar disorder presents in a manic state  She is clinically improving with an improved mood  She attends and participates in groups  Patient is still experiencing thoughts of paranoia and delusions  She becomes agitated due to her paranoid thoughts about other patients  She is labile and continues to have disorganized thoughts  Patient is satisfied with her medications and believes the Tegretol is working  Patient's family has agreed to take her back upon discharge  Her ACT manager, Jose Juan Fuller is following her case and plans to visit this week for evaluation of progress  Plan:  1 ) Continue psychotropic medications  2 ) Encourage coping skills for agitation  3 ) Contact ACT , Jose Juan Fuller for evaluation meeting - [phone]    Interval History:  Overnight, patient was agitated  She believed another patient was "making fun of my daughter " She said she is having anxiety about her daughter being "ridiculed" and receiving multiple phone calls in response to the patient being feature in the [place] [address] news paper  She continues to feel depressed about losing her sister recently in a drug overdose  She continues to perseverate on thoughts of her boyfriend possibly cheating on her with multiple women including her best friend, Kellee Deleon  Despite multiple reassurances, the patient still believes she is diabetic  Today, she says she is in a better mood but still feels anxious and wants to go home  She insists that she is "not manic anymore " She denies any suicidal thoughts, plans or intent   She is not having hallucinations but continues to experience delusions and paranoid thought  She is not having any homicidal ideations  Behavior over the last 24 hours:  improved  Sleep: improving, early awakening  Appetite: increased  Medication side effects: Yes, dizziness, daytime sleepiness  ROS: blurred vision at times    Mental Status Evaluation:  Appearance:  age appropriate and casually dressed   Behavior:  cooperative   Speech:  loud, pressured and tangential   Mood:  labile   Affect:  increased in range and labile   Thought Process:  perserverative and tangential   Thought Content:  delusions  obsessive/rumination   Perceptual Disturbances: None   Risk Potential: Suicidal Ideations none, Homicidal Ideations none and Potential for Aggression No   Sensorium:  person, place, time/date and year   Cognition:  grossly intact   Consciousness:  alert and awake    Attention: attention span appeared shorter than expected for age   Insight:  limited   Judgment: limited   Gait/Station: normal gait/station and normal balance   Motor Activity: no abnormal movements     Progress Toward Goals: progressing    Recommended Treatment: Continue with group therapy, milieu therapy and occupational therapy  Risks, benefits and possible side effects of Medications:   Risks, benefits, and possible side effects of medications explained to patient and patient verbalizes understanding  Medications: all current active meds have been reviewed      Labs:   RPR: in process  Lab Results   Component Value Date    GLUCOSE 87 12/01/2017    CALCIUM 8 5 12/01/2017     12/01/2017    K 4 1 12/01/2017    CO2 28 12/01/2017     12/01/2017    BUN 11 12/01/2017    CREATININE 0 78 12/01/2017     Lab Results   Component Value Date    WBC 7 43 12/01/2017    HGB 12 5 12/01/2017    HCT 39 5 12/01/2017    MCV 90 12/01/2017     12/01/2017       DISPO: Meeting with ACT , Ceasar Barriga to evaluate patient progress

## 2017-12-05 NOTE — CMS CERTIFICATION NOTE
Recertification: Based upon physical, mental and social evaluations, I certify that inpatient psychiatric services continue to be medically necessary for this patient for a duration of 20 midnights for the treatment of  Bipolar I disorder, most recent episode mixed, severe with psychotic features [facility])  Available alternative community resources still do not meet the patient's mental health care needs  I further attest that an established written individualized plan of care has been updated and is outlined in the patient's medical records

## 2017-12-06 RX ORDER — QUETIAPINE FUMARATE 300 MG/1
600 TABLET, FILM COATED ORAL
Status: DISCONTINUED | OUTPATIENT
Start: 2017-12-06 | End: 2017-12-15 | Stop reason: HOSPADM

## 2017-12-06 RX ADMIN — QUETIAPINE FUMARATE 600 MG: 300 TABLET ORAL at 21:04

## 2017-12-06 RX ADMIN — NICOTINE 21 MG: 21 PATCH, EXTENDED RELEASE TRANSDERMAL at 08:39

## 2017-12-06 RX ADMIN — BETAMETHASONE DIPROPIONATE 1 APPLICATION: 0.5 OINTMENT TOPICAL at 08:40

## 2017-12-06 RX ADMIN — CARBAMAZEPINE 200 MG: 100 TABLET, CHEWABLE ORAL at 08:40

## 2017-12-06 RX ADMIN — CARBAMAZEPINE 400 MG: 100 TABLET, CHEWABLE ORAL at 17:19

## 2017-12-06 RX ADMIN — ACETAMINOPHEN 650 MG: 325 TABLET, FILM COATED ORAL at 12:38

## 2017-12-06 RX ADMIN — HYDROXYZINE HYDROCHLORIDE 50 MG: 50 TABLET, FILM COATED ORAL at 16:43

## 2017-12-06 RX ADMIN — QUETIAPINE FUMARATE 200 MG: 200 TABLET ORAL at 08:39

## 2017-12-06 RX ADMIN — Medication 1 APPLICATION: at 06:20

## 2017-12-06 RX ADMIN — GABAPENTIN 300 MG: 300 CAPSULE ORAL at 17:19

## 2017-12-06 RX ADMIN — DOCUSATE SODIUM 100 MG: 100 CAPSULE, LIQUID FILLED ORAL at 08:39

## 2017-12-06 RX ADMIN — HYDROXYZINE HYDROCHLORIDE 50 MG: 50 TABLET, FILM COATED ORAL at 13:39

## 2017-12-06 RX ADMIN — OLANZAPINE 10 MG: 10 TABLET, ORALLY DISINTEGRATING ORAL at 17:40

## 2017-12-06 NOTE — PROGRESS NOTES
Patient agitated and as needed Atarax given  Patient kept repeating, "I am not getting my phone calls, people are making fun of my daughter and I want to be transferred to a hospital closer to [facility] " I explained that I don't know how to fasilitate this but it would be passed on  I also spoke with staff to make sure that she gets phone calls and does not miss them  Patient sitting with ICM worker at this moment, I discussed with her that patient was agitated and wants out of here now  Patient appears calm at this time when I peeked in on them

## 2017-12-06 NOTE — PLAN OF CARE
Problem: Alteration in Thoughts and Perception  Goal: Treatment Goal: Gain control of psychotic behaviors/thinking, reduce/eliminate presenting symptoms and demonstrate improved reality functioning upon discharge  Outcome: Progressing    Goal: Verbalize thoughts and feelings  Interventions:  - Promote a nonjudgmental and trusting relationship with the patient through active listening and therapeutic communication  - Assess patient's level of functioning, behavior and potential for risk  - Engage patient in 1 on 1 interactions for a minimum of 15 minutes each session  - Encourage patient to express fears, feelings, frustrations, and discuss symptoms    - [place] patient to reality, help patient recognize reality-based thinking   - Administer medications as ordered and assess for potential side effects  - Provide the patient education related to the signs and symptoms of the illness and desired effects of prescribed medications   Outcome: Progressing    Goal: Refrain from acting on delusional thinking/internal stimuli  Interventions:  - Monitor patient closely, per order   - Utilize least restrictive measures   - Set reasonable limits, give positive feedback for acceptable   - Administer medications as ordered and monitor of potential side effects   Outcome: Progressing    Goal: Agree to be compliant with medication regime, as prescribed and report medication side effects  Interventions:  - Offer appropriate PRN medication and supervise ingestion; conduct aims, as needed    Outcome: Progressing    Goal: Recognize dysfunctional thoughts, communicate reality-based thoughts at the time of discharge  Interventions:  - Provide medication and psycho-education to assist patient in compliance and developing insight into his/her illness    Outcome: Progressing    Goal: Complete daily ADLs, including personal hygiene independently, as able  Interventions:  - Observe, teach, and assist patient with ADLS  - Monitor and promote a balance of rest/activity, with adequate nutrition and elimination    Outcome: Progressing      Problem: DISCHARGE PLANNING  Goal: Discharge to home or other facility with appropriate resources  INTERVENTIONS:  - Identify barriers to discharge w/patient and caregiver  - Arrange for needed discharge resources and transportation as appropriate  - Identify discharge learning needs (meds, wound care, etc )  - Refer to Case Management Department for coordinating discharge planning if the patient needs post-hospital services based on physician/advanced practitioner order or complex needs related to functional status, cognitive ability, or social support system   Goal extended 12/1/17    Outcome: Progressing      Problem: Ineffective Coping  Goal: Participates in unit activities  Interventions:  - Provide therapeutic environment   - Provide required programming   - Redirect inappropriate behaviors    Outcome: Progressing

## 2017-12-06 NOTE — PROGRESS NOTES
PRN Zyprexa adm after pt was threatening " to punch the N in his face next time he tries to touch my nose  I do not like black people especially when they touch me with their nasty hands "  Pt would not stop with the verbal threats  Security called to unit and pt given PRN Zyprexa  Pt is presently in her room still talking loudly about why she hates black people  Pt denies SI, HI and denies AVH

## 2017-12-06 NOTE — PROGRESS NOTES
Progress Note - [address] 40 y o  female MRN: [id number]   Unit/Bed#: TW7 645-71 Encounter: [id number]    Behavior over the last 24 hours: unchanged  Sol Blades remains agitated at times, irritable and paranoid, still has pressured, tangential speech and disorganized thinking process  She was agitated this morning, yelling at staff - blamed that during the session on her stressors: "I lost my mother, my sister    What do you want me to do? I have the right to be upset"  Thinks other patient have been "making up stories about me"  Refused Neurontin this morning  Attends some groups  Sleep: slept off and on  Appetite: normal  Medication side effects: No   ROS: reports headache, denies any shortness of breath or chest pain    Mental Status Evaluation:    Appearance:  casually dressed   Behavior:  cooperative   Speech:  pressured, tangential   Mood:  labile, irritable   Affect:  labile   Thought Process:  disorganized, illogical   Associations: tangential associations   Thought Content:  paranoid delusions   Perceptual Disturbances: denies auditory or visual hallucinations when asked, but appears distracted   Risk Potential: Suicidal ideation - None  Homicidal ideation - None  Potential for aggression - No   Sensorium:  oriented to person, place and time/date   Memory:  recent and remote memory grossly intact   Consciousness:  alert and awake   Attention: decreased concentration and decreased attention span   Insight:  impaired   Judgment: impaired   Gait/Station: normal gait/station and normal balance   Motor Activity: no abnormal movements     Vital signs in last 24 hours:    Temp:  [98 °F (36 7 °C)-98 1 °F (36 7 °C)] 98 °F (36 7 °C)  HR:  [76-89] 76  Resp:  [17-18] 17  BP: (123-129)/(75-78) 129/75    Laboratory results:  I have personally reviewed all pertinent laboratory/tests results      RPR:   Lab Results   Component Value Date    RPR Non-Reactive 2017 Progress Toward Goals: no significant progress, still irritable, remains labile, poor insight, still has psychotic symptoms    Assessment/Plan   Principal Problem:    Bipolar I disorder, most recent episode mixed, severe with psychotic features ([address] )  Active Problems:    Cannabis abuse    UTI (urinary tract infection)    Constipation    Recommended Treatment:     Planned medication and treatment changes: All current active medications have been reviewed  Encourage group therapy, milieu therapy and occupational therapy  Behavioral Health checks every 15 minutes  Increase Seroquel to 200 mg daily and 600 mg at bedtime  Consider adding Haldol if no improvement  Recheck Tegretol level in the morning    Continue all other medications:      betamethasone dipropionate  Topical BID   carBAMazepine 200 mg Oral Daily   carBAMazepine 400 mg Oral QPM   docusate sodium 100 mg Oral BID   gabapentin 300 mg Oral BID   nicotine 21 mg Transdermal Daily   QUEtiapine 200 mg Oral QAM   QUEtiapine 600 mg Oral HS       Risks / Benefits of Treatment:    Risks, benefits, and possible side effects of medications explained to patient and patient verbalizes understanding and agreement for treatment  Risks of medications in pregnancy explained if female patient  Patient verbalizes understanding and agrees to notify her doctor if she becomes pregnant  Counseling / Coordination of Care:    Patient's progress discussed with staff in treatment team meeting  Medications, treatment progress and treatment plan reviewed with patient  Medication changes discussed with patient

## 2017-12-06 NOTE — PROGRESS NOTES
Patient was speaking loudly and pressured and fixated on being discharged  Patient thinks other patients, "Making up stories  " will keep her here  Patient stated, "I need to get home because there are no groceries at home "   Patient denied all symptoms and I reiterated to keep up her positive behaviors of not feeding into negativity and going to group activities    Patient refused her Gabapentin and said, "The side effects was it was making me feel angry and I told Dr Delmy Maxwell I would not take anymore "

## 2017-12-07 LAB — CARBAMAZEPINE SERPL-MCNC: 8.3 UG/ML (ref 4–12)

## 2017-12-07 PROCEDURE — 80156 ASSAY CARBAMAZEPINE TOTAL: CPT | Performed by: PSYCHIATRY & NEUROLOGY

## 2017-12-07 RX ORDER — PERPHENAZINE 4 MG/1
2 TABLET, FILM COATED ORAL 2 TIMES DAILY
Status: DISCONTINUED | OUTPATIENT
Start: 2017-12-07 | End: 2017-12-08

## 2017-12-07 RX ADMIN — GABAPENTIN 300 MG: 300 CAPSULE ORAL at 17:20

## 2017-12-07 RX ADMIN — OLANZAPINE 10 MG: 10 TABLET, ORALLY DISINTEGRATING ORAL at 08:29

## 2017-12-07 RX ADMIN — CARBAMAZEPINE 400 MG: 100 TABLET, CHEWABLE ORAL at 17:20

## 2017-12-07 RX ADMIN — ACETAMINOPHEN 650 MG: 325 TABLET, FILM COATED ORAL at 16:06

## 2017-12-07 RX ADMIN — PERPHENAZINE 2 MG: 4 TABLET, FILM COATED ORAL at 17:21

## 2017-12-07 RX ADMIN — NICOTINE 21 MG: 21 PATCH, EXTENDED RELEASE TRANSDERMAL at 08:19

## 2017-12-07 RX ADMIN — QUETIAPINE FUMARATE 200 MG: 200 TABLET ORAL at 08:19

## 2017-12-07 RX ADMIN — QUETIAPINE FUMARATE 600 MG: 300 TABLET ORAL at 21:13

## 2017-12-07 RX ADMIN — CARBAMAZEPINE 200 MG: 100 TABLET, CHEWABLE ORAL at 08:19

## 2017-12-07 RX ADMIN — DOCUSATE SODIUM 100 MG: 100 CAPSULE, LIQUID FILLED ORAL at 08:19

## 2017-12-07 RX ADMIN — DOCUSATE SODIUM 100 MG: 100 CAPSULE, LIQUID FILLED ORAL at 17:21

## 2017-12-07 RX ADMIN — GABAPENTIN 300 MG: 300 CAPSULE ORAL at 08:19

## 2017-12-07 RX ADMIN — PERPHENAZINE 2 MG: 4 TABLET, FILM COATED ORAL at 09:19

## 2017-12-07 NOTE — PROGRESS NOTES
Progress Note - [address] 40 y o  female MRN: [id number]   Unit/Bed#: QK5 740-66 Encounter: [id number]    Behavior over the last 24 hours: josé Christina was agitated again yesterday and threatened to punch peer  She still thinks that peers are talking about her and that everybody is against her and making fun of her "They say I am a lesbian  They all are making up stories"  She still has pressured, tangential speech  Became agitated during the session today and started yelling at me "You are trying to kill me"  Has been taking medications reluctantly  Attends some groups  Sleep: slept better  Appetite: normal  Medication side effects: No   ROS: reports foot pain, denies any shortness of breath or chest pain    Mental Status Evaluation:"    Appearance:  casually dressed   Behavior:  agitated, uncooperative   Speech:  pressured, tangential   Mood:  labile, irritable   Affect:  labile   Thought Process:  disorganized, illogical   Associations: tangential associations   Thought Content:  persecutory delusions   Perceptual Disturbances: denies auditory or visual hallucinations when asked, but appears distracted   Risk Potential: Suicidal ideation - None  Homicidal ideation - Yes, towards peer  Potential for aggression - Yes, due to poor impulse control   Sensorium:  oriented to person, place and time/date   Memory:  recent and remote memory grossly intact   Consciousness:  alert and awake   Attention: poor concentration and poor attention span   Insight:  significantly impaired   Judgment: significantly impaired   Gait/Station: normal gait/station and normal balance   Motor Activity: no abnormal movements     Vital signs in last 24 hours:    Temp:  [97 6 °F (36 4 °C)-98 1 °F (36 7 °C)] 97 6 °F (36 4 °C)  HR:  [] 84  Resp:  [16] 16  BP: (125-145)/(74-89) 125/74    Laboratory results:   I have personally reviewed all pertinent laboratory/tests results      Tegretol:   Lab Results Component Value Date    CARBAMAZEPIN 8 3 12/07/2017       Progress Toward Goals: no progress, still irritable, still labile, still very paranoid, insight remains poor, poor reality testing    Assessment/Plan   Principal Problem:    Bipolar I disorder, most recent episode mixed, severe with psychotic features ([facility] [address] )  Active Problems:    Cannabis abuse    UTI (urinary tract infection)    Constipation    Recommended Treatment:     Planned medication and treatment changes: All current active medications have been reviewed  Encourage group therapy, milieu therapy and occupational therapy  Behavioral Health checks every 15 minutes  Start Trilafon 2 mg bid and titrate dose - no progress on maximum dose of Seroquel    Continue all other medications:      betamethasone dipropionate  Topical BID   carBAMazepine 200 mg Oral Daily   carBAMazepine 400 mg Oral QPM   docusate sodium 100 mg Oral BID   gabapentin 300 mg Oral BID   nicotine 21 mg Transdermal Daily   perphenazine 2 mg Oral BID   QUEtiapine 200 mg Oral QAM   QUEtiapine 600 mg Oral HS       Risks / Benefits of Treatment:    Risks, benefits, and possible side effects of medications explained to patient  Patient has limited understanding of risks and benefits of treatment at this time, but agrees to take medications as prescribed  Risks of medications in pregnancy explained if female patient  Patient verbalizes understanding and agrees to notify her doctor if she becomes pregnant  The patient has a history of at least 3 antipsychotic medication trials and at this time requires treatment with 2 antipsychotic agents due to failed multiple trials of monotherapy  Counseling / Coordination of Care:    Patient's progress discussed with staff in treatment team meeting  Medications, treatment progress and treatment plan reviewed with patient  Medication changes discussed with patient  Medication education provided to patient

## 2017-12-07 NOTE — PROGRESS NOTES
Patient verbally abusive, cursing, yelling  Patient tearful  Patient verbally threatening staff, demanding, and staff splitting  Gave PRN Zyprexa  Patient is negatively fixated on another patient and a male MHT  Patient insisting staff member touched her nose and called her a "bitch "  Patient also states that another patient called her a "fat bitch "  Patient insists that other patients are interfering with her discharge and that the "male patients are getting to go home "  Patient also states "we are poisoning her and giving 30 pills is too much  Patient has been very agitated during the morning  Patient confined to room until she can control her behavior and emotions

## 2017-12-07 NOTE — PLAN OF CARE

## 2017-12-07 NOTE — PLAN OF CARE
Alteration in Thoughts and Perception     Treatment Goal: Gain control of psychotic behaviors/thinking, reduce/eliminate presenting symptoms and demonstrate improved reality functioning upon discharge Not Progressing     Verbalize thoughts and feelings Not Progressing     Refrain from acting on delusional thinking/internal stimuli Not Progressing     Agree to be compliant with medication regime, as prescribed and report medication side effects Not Progressing     Recognize dysfunctional thoughts, communicate reality-based thoughts at the time of discharge Not Progressing     Complete daily ADLs, including personal hygiene independently, as able Not Progressing        DISCHARGE PLANNING     Discharge to home or other facility with appropriate resources Not Progressing        Ineffective Coping     Participates in unit activities Not Progressing

## 2017-12-08 RX ORDER — BACITRACIN, NEOMYCIN, POLYMYXIN B 400; 3.5; 5 [USP'U]/G; MG/G; [USP'U]/G
1 OINTMENT TOPICAL 2 TIMES DAILY PRN
Status: DISCONTINUED | OUTPATIENT
Start: 2017-12-08 | End: 2017-12-15 | Stop reason: HOSPADM

## 2017-12-08 RX ORDER — PERPHENAZINE 4 MG/1
4 TABLET, FILM COATED ORAL 2 TIMES DAILY
Status: DISCONTINUED | OUTPATIENT
Start: 2017-12-08 | End: 2017-12-11

## 2017-12-08 RX ADMIN — BETAMETHASONE DIPROPIONATE: 0.5 OINTMENT TOPICAL at 09:52

## 2017-12-08 RX ADMIN — Medication: at 09:52

## 2017-12-08 RX ADMIN — DOCUSATE SODIUM 100 MG: 100 CAPSULE, LIQUID FILLED ORAL at 09:36

## 2017-12-08 RX ADMIN — CARBAMAZEPINE 200 MG: 100 TABLET, CHEWABLE ORAL at 09:36

## 2017-12-08 RX ADMIN — GABAPENTIN 300 MG: 300 CAPSULE ORAL at 09:36

## 2017-12-08 RX ADMIN — NICOTINE 21 MG: 21 PATCH, EXTENDED RELEASE TRANSDERMAL at 09:39

## 2017-12-08 RX ADMIN — QUETIAPINE FUMARATE 200 MG: 200 TABLET ORAL at 09:36

## 2017-12-08 RX ADMIN — CARBAMAZEPINE 400 MG: 100 TABLET, CHEWABLE ORAL at 17:30

## 2017-12-08 RX ADMIN — QUETIAPINE FUMARATE 600 MG: 300 TABLET ORAL at 21:12

## 2017-12-08 RX ADMIN — DOCUSATE SODIUM 100 MG: 100 CAPSULE, LIQUID FILLED ORAL at 17:29

## 2017-12-08 RX ADMIN — PERPHENAZINE 4 MG: 4 TABLET, FILM COATED ORAL at 17:34

## 2017-12-08 RX ADMIN — PERPHENAZINE 2 MG: 4 TABLET, FILM COATED ORAL at 09:34

## 2017-12-08 RX ADMIN — GABAPENTIN 300 MG: 300 CAPSULE ORAL at 17:30

## 2017-12-08 NOTE — PROGRESS NOTES
Pt remains irritable and verbally abusive to staff and a few of the patients on the unit  Pt is still arguing every time she is supposed to get medication  She is calling the  " stupid idiot who wants to kill me and then my family will elvis her "   Pt does not believe she needs to be on medication  Pt denies SI, HI and denies AVH

## 2017-12-08 NOTE — PROGRESS NOTES
Progress Note - [address] 40 y o  female MRN: [id number]   Unit/Bed#: DX8 993-21 Encounter: [id number]    Behavior over the last 24 hours: unchanged  Edd Saldivar was agitated again yesterday evening and was verbally abusive towards staff  Today she is slightly calmer - apologized for her behavior during the session yesterday  Her speech is still pressured and tangential, however, insight remains poor "I am fine, I need to go home"  States she "slept all night", but per staff report she was awake most of the night, folding clothes in the dark  She still has paranoid thoughts "I feel someone is following me"  Has been taking medications, but told staff she did not need to be on medications  Attends some groups      Sleep: insomnia  Appetite: normal  Medication side effects: No   ROS: reports headache, denies any chest pain or abdominal pain    Mental Status Evaluation:    Appearance:  casually dressed   Behavior:  more cooperative   Speech:  pressured, tangential   Mood:  labile, irritable   Affect:  labile   Thought Process:  disorganized, illogical   Associations: tangential associations   Thought Content:  paranoid delusions   Perceptual Disturbances: denies auditory or visual hallucinations when asked, but appears distracted   Risk Potential: Suicidal ideation - None  Homicidal ideation - None at present  Potential for aggression - Yes, due to poor impulse control   Sensorium:  oriented to person, place and time/date   Memory:  recent and remote memory grossly intact   Consciousness:  alert and awake   Attention: poor concentration and poor attention span   Insight:  impaired   Judgment: impaired   Gait/Station: normal gait/station and normal balance   Motor Activity: no abnormal movements     Vital signs in last 24 hours:    Temp:  [97 4 °F (36 3 °C)-97 8 °F (36 6 °C)] 97 4 °F (36 3 °C)  HR:  [] 87  Resp:  [18] 18  BP: (135-138)/(70-84) 138/84    Laboratory results:  I have personally reviewed all pertinent laboratory/tests results  Progress Toward Goals: no significant improvement, still irritable, remains labile, poor insight, still has psychotic symptoms    Assessment/Plan   Principal Problem:    Bipolar I disorder, most recent episode mixed, severe with psychotic features ([facility] [address] )  Active Problems:    Cannabis abuse    UTI (urinary tract infection)    Constipation    Recommended Treatment:     Planned medication and treatment changes: All current active medications have been reviewed  Encourage group therapy, milieu therapy and occupational therapy  Behavioral Health checks every 15 minutes  Increase Trilafon to 4 mg bid and titrate dose to help with psychotic symptoms    Continue all other medications:      betamethasone dipropionate  Topical BID   carBAMazepine 200 mg Oral Daily   carBAMazepine 400 mg Oral QPM   docusate sodium 100 mg Oral BID   gabapentin 300 mg Oral BID   nicotine 21 mg Transdermal Daily   perphenazine 4 mg Oral BID   QUEtiapine 200 mg Oral QAM   QUEtiapine 600 mg Oral HS       Risks / Benefits of Treatment:    Risks, benefits, and possible side effects of medications explained to patient  Patient has limited understanding of risks and benefits of treatment at this time, but agrees to take medications as prescribed  Risks of medications in pregnancy explained if female patient  Patient verbalizes understanding and agrees to notify her doctor if she becomes pregnant  The patient has a history of at least 3 antipsychotic medication trials and at this time requires treatment with 2 antipsychotic agents due to failed multiple trials of monotherapy  Counseling / Coordination of Care:    Patient's progress discussed with staff in treatment team meeting  Medications, treatment progress and treatment plan reviewed with patient  Medication changes discussed with patient

## 2017-12-08 NOTE — PROGRESS NOTES
Patient irritable and angry but ultimately cooperative with medications this morning  Had rapid, pressured speech in which she expressed paranoid thoughts about other patients "ruining my discharge" and "taking my stuff " Complained about "reverse racism" on the unit and that she felt minority patients were discharged first  She reportedly told a staff member that she looked "like someone who would try to take my phone " Patient expressed that her sister is a good source of support for her and said she wanted to see her boyfriend despite her sister's and her own misgivings about him  Denied SI and HI

## 2017-12-08 NOTE — PROGRESS NOTES
Pt remains irritable and angry that she is prescribed medication at 1800  Pt's speech is loud and and rapid  She is still blaming another female patient for jeopardizing her d/c  Pt also feels that she is a being discriminated on because " I am white "  Pt does not feel that she needs to be in the hospital or be on medications  Pt denies SI, HI and denies AVH

## 2017-12-08 NOTE — PLAN OF CARE
Alteration in Thoughts and Perception     Treatment Goal: Gain control of psychotic behaviors/thinking, reduce/eliminate presenting symptoms and demonstrate improved reality functioning upon discharge Not Progressing     Recognize dysfunctional thoughts, communicate reality-based thoughts at the time of discharge Not Progressing        Patient expresses paranoid thoughts       Alteration in Thoughts and Perception     Verbalize thoughts and feelings Progressing     Refrain from acting on delusional thinking/internal stimuli Progressing     Agree to be compliant with medication regime, as prescribed and report medication side effects Progressing     Complete daily ADLs, including personal hygiene independently, as able Progressing        Nutrition/Hydration-ADULT     Nutrient/Hydration intake appropriate for improving, restoring or maintaining nutritional needs Progressing

## 2017-12-09 RX ADMIN — CARBAMAZEPINE 400 MG: 100 TABLET, CHEWABLE ORAL at 17:10

## 2017-12-09 RX ADMIN — QUETIAPINE FUMARATE 600 MG: 300 TABLET ORAL at 22:48

## 2017-12-09 RX ADMIN — GABAPENTIN 300 MG: 300 CAPSULE ORAL at 08:37

## 2017-12-09 RX ADMIN — Medication: at 08:44

## 2017-12-09 RX ADMIN — DOCUSATE SODIUM 100 MG: 100 CAPSULE, LIQUID FILLED ORAL at 17:10

## 2017-12-09 RX ADMIN — PERPHENAZINE 4 MG: 4 TABLET, FILM COATED ORAL at 08:37

## 2017-12-09 RX ADMIN — PERPHENAZINE 4 MG: 4 TABLET, FILM COATED ORAL at 17:10

## 2017-12-09 RX ADMIN — ACETAMINOPHEN 975 MG: 325 TABLET, FILM COATED ORAL at 03:29

## 2017-12-09 RX ADMIN — CARBAMAZEPINE 200 MG: 100 TABLET, CHEWABLE ORAL at 08:37

## 2017-12-09 RX ADMIN — NICOTINE 21 MG: 21 PATCH, EXTENDED RELEASE TRANSDERMAL at 08:44

## 2017-12-09 RX ADMIN — BETAMETHASONE DIPROPIONATE: 0.5 OINTMENT TOPICAL at 08:45

## 2017-12-09 RX ADMIN — QUETIAPINE FUMARATE 200 MG: 200 TABLET ORAL at 08:37

## 2017-12-09 RX ADMIN — Medication: at 08:43

## 2017-12-09 RX ADMIN — GABAPENTIN 300 MG: 300 CAPSULE ORAL at 17:10

## 2017-12-09 RX ADMIN — DOCUSATE SODIUM 100 MG: 100 CAPSULE, LIQUID FILLED ORAL at 08:37

## 2017-12-09 NOTE — PROGRESS NOTES
Was calm this am , took her medications with out questioning them   Shaan Tomlin Required she mcguire to take with her meds though  No si or hi  Dianaha Curd to room and sleeping all am

## 2017-12-09 NOTE — PLAN OF CARE
Problem: Alteration in Thoughts and Perception  Goal: Treatment Goal: Gain control of psychotic behaviors/thinking, reduce/eliminate presenting symptoms and demonstrate improved reality functioning upon discharge  Outcome: Progressing    Goal: Verbalize thoughts and feelings  Interventions:  - Promote a nonjudgmental and trusting relationship with the patient through active listening and therapeutic communication  - Assess patient's level of functioning, behavior and potential for risk  - Engage patient in 1 on 1 interactions for a minimum of 15 minutes each session  - Encourage patient to express fears, feelings, frustrations, and discuss symptoms    - [place] patient to reality, help patient recognize reality-based thinking   - Administer medications as ordered and assess for potential side effects  - Provide the patient education related to the signs and symptoms of the illness and desired effects of prescribed medications   Outcome: Progressing    Goal: Refrain from acting on delusional thinking/internal stimuli  Interventions:  - Monitor patient closely, per order   - Utilize least restrictive measures   - Set reasonable limits, give positive feedback for acceptable   - Administer medications as ordered and monitor of potential side effects   Outcome: Progressing    Goal: Agree to be compliant with medication regime, as prescribed and report medication side effects  Interventions:  - Offer appropriate PRN medication and supervise ingestion; conduct aims, as needed    Outcome: Progressing    Goal: Recognize dysfunctional thoughts, communicate reality-based thoughts at the time of discharge  Interventions:  - Provide medication and psycho-education to assist patient in compliance and developing insight into his/her illness    Outcome: Progressing    Goal: Complete daily ADLs, including personal hygiene independently, as able  Interventions:  - Observe, teach, and assist patient with ADLS  - Monitor and promote a balance of rest/activity, with adequate nutrition and elimination    Outcome: Progressing      Problem: DISCHARGE PLANNING  Goal: Discharge to home or other facility with appropriate resources  INTERVENTIONS:  - Identify barriers to discharge w/patient and caregiver  - Arrange for needed discharge resources and transportation as appropriate  - Identify discharge learning needs (meds, wound care, etc )  - Refer to Case Management Department for coordinating discharge planning if the patient needs post-hospital services based on physician/advanced practitioner order or complex needs related to functional status, cognitive ability, or social support system   Goal extended 12/1/17  Goal extended on 12/6/2017     Outcome: Progressing      Problem: Ineffective Coping  Goal: Participates in unit activities  Interventions:  - Provide therapeutic environment   - Provide required programming   - Redirect inappropriate behaviors    Outcome: Progressing      Problem: Nutrition/Hydration-ADULT  Goal: Nutrient/Hydration intake appropriate for improving, restoring or maintaining nutritional needs  Monitor and assess patient's nutrition/hydration status for malnutrition (ex- brittle hair, bruises, dry skin, pale skin and conjunctiva, muscle wasting, smooth red tongue, and disorientation)  Collaborate with interdisciplinary team and initiate plan and interventions as ordered  Monitor patient's weight and dietary intake as ordered or per policy  Utilize nutrition screening tool and intervene per policy  Determine patient's food preferences and provide high-protein, high-caloric foods as appropriate       INTERVENTIONS:  - Monitor oral intake, urinary output, labs, and treatment plans  - Assess nutrition and hydration status and recommend course of action  - Evaluate amount of meals eaten  - Assist patient with eating if necessary   - Allow adequate time for meals  - Recommend/ encourage appropriate diets, oral nutritional supplements, and vitamin/mineral supplements  - Order, calculate, and assess calorie counts as needed  - Recommend, monitor, and adjust tube feedings and TPN/PPN based on assessed needs  - Assess need for intravenous fluids  - Provide specific nutrition/hydration education as appropriate  - Include patient/family/caregiver in decisions related to nutrition   -Goal extended on 12/7/2017   Outcome: Progressing

## 2017-12-09 NOTE — PROGRESS NOTES
C/O" feel meds are lot better "    Report from staff regarding this patient received and record reviewed  prior to seeing this patient   Behavior over the last 24 hours:    Sleep:lot better  Appetite:ok  Medication side effects:none  ROS:improving but still symptomatic  Mental Status Evaluation:  Appearance:  Dressed appropraitely   Behavior:  cooperative   Speech:  normal   Mood:  vazquez   Affect:  appropriate    Thought Process:  disorganzied   Thought Content:  normal   Perceptual Disturbances: Denied AV hallucination   Risk Potential: NO JAQUAN    Sensorium:  normal   Cognition:  intact   Consciousness:  Alert, OX3   Attention: Fair   Insight:  poor   Judgment: limited   Gait/Station: With in normal range   Motor Activity: With in normal range     Progress Toward Goals: working on current treatment goals, no changes  Made in treatment plan   Recommended Treatment: Continue with group therapy, milieu therapy and occupational therapy  Risks, benefits and possible side effects of Medications:   Risks, benefits, and possible side effects of medications explained to patient and patient verbalizes understanding        Medications:   current meds:   Current Facility-Administered Medications   Medication Dose Route Frequency    acetaminophen (TYLENOL) tablet 650 mg  650 mg Oral Q6H PRN    acetaminophen (TYLENOL) tablet 975 mg  975 mg Oral Q6H PRN    aluminum-magnesium hydroxide-simethicone (MYLANTA) 200-200-20 mg/5 mL oral suspension 30 mL  30 mL Oral Q4H PRN    ammonium lactate (LAC-HYDRIN) 12 % cream   Topical BID PRN    benztropine (COGENTIN) injection 1 mg  1 mg Intramuscular Q6H PRN    benztropine (COGENTIN) tablet 1 mg  1 mg Oral Q6H PRN    betamethasone dipropionate (DIPROSONE) 0 05 % ointment   Topical BID    carBAMazepine (TEGretol) chewable tablet 200 mg  200 mg Oral Daily    carBAMazepine (TEGretol) chewable tablet 400 mg  400 mg Oral QPM    docusate sodium (COLACE) capsule 100 mg  100 mg Oral BID    gabapentin (NEURONTIN) capsule 300 mg  300 mg Oral BID    haloperidol (HALDOL) tablet 5 mg  5 mg Oral Q6H PRN    haloperidol lactate (HALDOL) injection 5 mg  5 mg Intramuscular Q6H PRN    hydrOXYzine HCL (ATARAX) tablet 50 mg  50 mg Oral Q6H PRN    ibuprofen (MOTRIN) tablet 600 mg  600 mg Oral Q6H PRN    influenza inactivated quadrivalent vaccine (FLULAVAL) IM injection 0 5 mL  0 5 mL Intramuscular Prior to discharge    magnesium hydroxide (MILK OF MAGNESIA) 400 mg/5 mL oral suspension 30 mL  30 mL Oral Daily PRN    neomycin-bacitracin-polymyxin b (NEOSPORIN) ointment 1 small application  1 small application Topical BID PRN    nicotine (NICODERM CQ) 21 mg/24 hr TD 24 hr patch 21 mg  21 mg Transdermal Daily    nicotine polacrilex (NICORETTE) gum 4 mg  4 mg Oral Q2H PRN    OLANZapine (ZyPREXA ZYDIS) dispersible tablet 10 mg  10 mg Oral Q3H PRN    OLANZapine (ZyPREXA) IM injection 10 mg  10 mg Intramuscular Q3H PRN    perphenazine tablet 4 mg  4 mg Oral BID    pneumococcal 23-valent polysaccharide vaccine (PNEUMOVAX-23) injection 0 5 mL  0 5 mL Subcutaneous Prior to discharge    polyvinyl alcohol (LIQUIFILM TEARS) 1 4 % ophthalmic solution 1 drop  1 drop Both Eyes Q3H PRN    promethazine (PHENERGAN) tablet 25 mg  25 mg Oral TID PRN    QUEtiapine (SEROquel) tablet 200 mg  200 mg Oral QAM    QUEtiapine (SEROquel) tablet 600 mg  600 mg Oral HS     Labs: NA    Assessment, Diagnosis  and Plan: continue with current meds and goals, F/U tomorrow    Counseling / Coordination of Care  Total floor / unit time spent today20 minutes  minutes  Greater than 50% of total time was spent with the patient and / or family counseling and / or coordination of care  A description of the counseling / coordination of care:      Josse Hickman MD

## 2017-12-10 RX ADMIN — ACETAMINOPHEN 650 MG: 325 TABLET, FILM COATED ORAL at 04:08

## 2017-12-10 RX ADMIN — QUETIAPINE FUMARATE 600 MG: 300 TABLET ORAL at 22:08

## 2017-12-10 RX ADMIN — CARBAMAZEPINE 200 MG: 100 TABLET, CHEWABLE ORAL at 08:50

## 2017-12-10 RX ADMIN — BETAMETHASONE DIPROPIONATE 1 APPLICATION: 0.5 OINTMENT TOPICAL at 20:12

## 2017-12-10 RX ADMIN — Medication: at 08:51

## 2017-12-10 RX ADMIN — Medication 1 APPLICATION: at 18:15

## 2017-12-10 RX ADMIN — Medication 1 APPLICATION: at 01:23

## 2017-12-10 RX ADMIN — PERPHENAZINE 4 MG: 4 TABLET, FILM COATED ORAL at 08:49

## 2017-12-10 RX ADMIN — BACITRACIN, NEOMYCIN, POLYMYXIN B 1 SMALL APPLICATION: 400; 3.5; 5 OINTMENT TOPICAL at 01:26

## 2017-12-10 RX ADMIN — GABAPENTIN 300 MG: 300 CAPSULE ORAL at 18:12

## 2017-12-10 RX ADMIN — GABAPENTIN 300 MG: 300 CAPSULE ORAL at 08:50

## 2017-12-10 RX ADMIN — DOCUSATE SODIUM 100 MG: 100 CAPSULE, LIQUID FILLED ORAL at 18:13

## 2017-12-10 RX ADMIN — CARBAMAZEPINE 400 MG: 100 TABLET, CHEWABLE ORAL at 18:12

## 2017-12-10 RX ADMIN — DOCUSATE SODIUM 100 MG: 100 CAPSULE, LIQUID FILLED ORAL at 08:49

## 2017-12-10 RX ADMIN — QUETIAPINE FUMARATE 200 MG: 200 TABLET ORAL at 08:49

## 2017-12-10 RX ADMIN — NICOTINE 21 MG: 21 PATCH, EXTENDED RELEASE TRANSDERMAL at 09:08

## 2017-12-10 RX ADMIN — PERPHENAZINE 4 MG: 4 TABLET, FILM COATED ORAL at 18:12

## 2017-12-10 NOTE — PROGRESS NOTES
Patient was asked to come for her medications and arrived teary looking but angry and irritable  She started complaining about the doctor increasing her medication instead of decreasing them as she asked her to do and immediately going on to say "when you are here I get more medications than I get from everybody else " "She's trying to make me have an  " Education, reality orientation I e  She is receiving scheduled medications as she does every evening  Angry, argumentative, irritable, accusing  Patient called police  this evening because she thought a tech stole her book bag  She told the police she couldn't find her birth certificate and because of that her father's health was failing

## 2017-12-10 NOTE — PROGRESS NOTES
Patient in bed all am, when giving her medication , I went over all the medications with her and showed her the packets, to confirm she was getting the right medication, that she was ordered by the DR Almeida and withdrawn

## 2017-12-10 NOTE — PLAN OF CARE
Problem: Alteration in Thoughts and Perception  Goal: Treatment Goal: Gain control of psychotic behaviors/thinking, reduce/eliminate presenting symptoms and demonstrate improved reality functioning upon discharge  Outcome: Progressing    Goal: Verbalize thoughts and feelings  Interventions:  - Promote a nonjudgmental and trusting relationship with the patient through active listening and therapeutic communication  - Assess patient's level of functioning, behavior and potential for risk  - Engage patient in 1 on 1 interactions for a minimum of 15 minutes each session  - Encourage patient to express fears, feelings, frustrations, and discuss symptoms    - Hortense patient to reality, help patient recognize reality-based thinking   - Administer medications as ordered and assess for potential side effects  - Provide the patient education related to the signs and symptoms of the illness and desired effects of prescribed medications   Outcome: Progressing    Goal: Refrain from acting on delusional thinking/internal stimuli  Interventions:  - Monitor patient closely, per order   - Utilize least restrictive measures   - Set reasonable limits, give positive feedback for acceptable   - Administer medications as ordered and monitor of potential side effects   Outcome: Progressing    Goal: Agree to be compliant with medication regime, as prescribed and report medication side effects  Interventions:  - Offer appropriate PRN medication and supervise ingestion; conduct aims, as needed    Outcome: Progressing    Goal: Recognize dysfunctional thoughts, communicate reality-based thoughts at the time of discharge  Interventions:  - Provide medication and psycho-education to assist patient in compliance and developing insight into his/her illness    Outcome: Progressing    Goal: Complete daily ADLs, including personal hygiene independently, as able  Interventions:  - Observe, teach, and assist patient with ADLS  - Monitor and promote a balance of rest/activity, with adequate nutrition and elimination    Outcome: Progressing      Problem: DISCHARGE PLANNING  Goal: Discharge to home or other facility with appropriate resources  INTERVENTIONS:  - Identify barriers to discharge w/patient and caregiver  - Arrange for needed discharge resources and transportation as appropriate  - Identify discharge learning needs (meds, wound care, etc )  - Refer to Case Management Department for coordinating discharge planning if the patient needs post-hospital services based on physician/advanced practitioner order or complex needs related to functional status, cognitive ability, or social support system   Goal extended 12/1/17  Goal extended on 12/6/2017     Outcome: Progressing      Problem: Ineffective Coping  Goal: Participates in unit activities  Interventions:  - Provide therapeutic environment   - Provide required programming   - Redirect inappropriate behaviors    Outcome: Progressing      Problem: Nutrition/Hydration-ADULT  Goal: Nutrient/Hydration intake appropriate for improving, restoring or maintaining nutritional needs  Monitor and assess patient's nutrition/hydration status for malnutrition (ex- brittle hair, bruises, dry skin, pale skin and conjunctiva, muscle wasting, smooth red tongue, and disorientation)  Collaborate with interdisciplinary team and initiate plan and interventions as ordered  Monitor patient's weight and dietary intake as ordered or per policy  Utilize nutrition screening tool and intervene per policy  Determine patient's food preferences and provide high-protein, high-caloric foods as appropriate       INTERVENTIONS:  - Monitor oral intake, urinary output, labs, and treatment plans  - Assess nutrition and hydration status and recommend course of action  - Evaluate amount of meals eaten  - Assist patient with eating if necessary   - Allow adequate time for meals  - Recommend/ encourage appropriate diets, oral nutritional supplements, and vitamin/mineral supplements  - Order, calculate, and assess calorie counts as needed  - Recommend, monitor, and adjust tube feedings and TPN/PPN based on assessed needs  - Assess need for intravenous fluids  - Provide specific nutrition/hydration education as appropriate  - Include patient/family/caregiver in decisions related to nutrition   -Goal extended on 12/7/2017   Outcome: Progressing

## 2017-12-10 NOTE — PROGRESS NOTES
C/O" I had good day, took my meds, I am ready to go tommorrow "    Report from staff regarding this patient received and record reviewed  prior to seeing this patient   Behavior over the last 24 hours:    Sleep:good  Appetite:ok  Medication side effects:none  ROS:still has racing thought andirritable at [facility]  [facility]  Mental Status Evaluation:  Appearance:  Dressed appropraitely   Behavior:  cooperative   Speech:  normal   Mood:  irritated   Affect:  appropriate     Thought Process:  Goal directed   Thought Content:  normal   Perceptual Disturbances: Denied AV hallucination   Risk Potential: NO JAQUAN    Sensorium:  normal   Cognition:  intact   Consciousness:  Alert, OX3   Attention: Fair   Insight:  fair   Judgment: fair   Gait/Station: With in normal range   Motor Activity: With in normal range     Progress Toward Goals: working on current treatment goals, no changes  Made in treatment plan   Recommended Treatment: Continue with group therapy, milieu therapy and occupational therapy  Risks, benefits and possible side effects of Medications:   Risks, benefits, and possible side effects of medications explained to patient and patient verbalizes understanding        Medications:   current meds:   Current Facility-Administered Medications   Medication Dose Route Frequency    acetaminophen (TYLENOL) tablet 650 mg  650 mg Oral Q6H PRN    acetaminophen (TYLENOL) tablet 975 mg  975 mg Oral Q6H PRN    aluminum-magnesium hydroxide-simethicone (MYLANTA) 200-200-20 mg/5 mL oral suspension 30 mL  30 mL Oral Q4H PRN    ammonium lactate (LAC-HYDRIN) 12 % cream   Topical BID PRN    benztropine (COGENTIN) injection 1 mg  1 mg Intramuscular Q6H PRN    benztropine (COGENTIN) tablet 1 mg  1 mg Oral Q6H PRN    betamethasone dipropionate (DIPROSONE) 0 05 % ointment   Topical BID    carBAMazepine (TEGretol) chewable tablet 200 mg  200 mg Oral Daily    carBAMazepine (TEGretol) chewable tablet 400 mg  400 mg Oral QPM    docusate sodium (COLACE) capsule 100 mg  100 mg Oral BID    gabapentin (NEURONTIN) capsule 300 mg  300 mg Oral BID    haloperidol (HALDOL) tablet 5 mg  5 mg Oral Q6H PRN    haloperidol lactate (HALDOL) injection 5 mg  5 mg Intramuscular Q6H PRN    hydrOXYzine HCL (ATARAX) tablet 50 mg  50 mg Oral Q6H PRN    ibuprofen (MOTRIN) tablet 600 mg  600 mg Oral Q6H PRN    influenza inactivated quadrivalent vaccine (FLULAVAL) IM injection 0 5 mL  0 5 mL Intramuscular Prior to discharge    magnesium hydroxide (MILK OF MAGNESIA) 400 mg/5 mL oral suspension 30 mL  30 mL Oral Daily PRN    neomycin-bacitracin-polymyxin b (NEOSPORIN) ointment 1 small application  1 small application Topical BID PRN    nicotine (NICODERM CQ) 21 mg/24 hr TD 24 hr patch 21 mg  21 mg Transdermal Daily    nicotine polacrilex (NICORETTE) gum 4 mg  4 mg Oral Q2H PRN    OLANZapine (ZyPREXA ZYDIS) dispersible tablet 10 mg  10 mg Oral Q3H PRN    OLANZapine (ZyPREXA) IM injection 10 mg  10 mg Intramuscular Q3H PRN    perphenazine tablet 4 mg  4 mg Oral BID    pneumococcal 23-valent polysaccharide vaccine (PNEUMOVAX-23) injection 0 5 mL  0 5 mL Subcutaneous Prior to discharge    polyvinyl alcohol (LIQUIFILM TEARS) 1 4 % ophthalmic solution 1 drop  1 drop Both Eyes Q3H PRN    promethazine (PHENERGAN) tablet 25 mg  25 mg Oral TID PRN    QUEtiapine (SEROquel) tablet 200 mg  200 mg Oral QAM    QUEtiapine (SEROquel) tablet 600 mg  600 mg Oral HS     Labs: NA    Assessment, Diagnosis  and Plan: continue with current meds and goals, F/U tomorrow    Counseling / Coordination of Care  Total floor / unit time spent today15 minutes  minutes  Greater than 50% of total time was spent with the patient and / or family counseling and / or coordination of care  A description of the counseling / coordination of care:      Iqra Draper MD

## 2017-12-10 NOTE — PROGRESS NOTES
Patient's boyfriend visited her this evening and when he was leaving he was trying to make plans with staff about when he should pick her up tomorrow - she had told him she was getting discharged tomorrow  She continues labile - at times pleasant and then becomes argumentative  Irritability centered around medications  She believes she is getting too much medication  Walked away from the nurses station without allowing a mouth check - she was stopped and a mouth check was done

## 2017-12-10 NOTE — PROGRESS NOTES
Patient awake and approached nursing station requesting neosporin and ammonium lactate for dry skin PRN at 0126  Meds effective within the hour

## 2017-12-11 RX ORDER — PERPHENAZINE 4 MG/1
4 TABLET, FILM COATED ORAL DAILY
Status: DISCONTINUED | OUTPATIENT
Start: 2017-12-12 | End: 2017-12-12

## 2017-12-11 RX ORDER — PERPHENAZINE 4 MG/1
8 TABLET, FILM COATED ORAL EVERY EVENING
Status: DISCONTINUED | OUTPATIENT
Start: 2017-12-11 | End: 2017-12-15 | Stop reason: HOSPADM

## 2017-12-11 RX ADMIN — BACITRACIN, NEOMYCIN, POLYMYXIN B 1 SMALL APPLICATION: 400; 3.5; 5 OINTMENT TOPICAL at 06:21

## 2017-12-11 RX ADMIN — GABAPENTIN 300 MG: 300 CAPSULE ORAL at 08:24

## 2017-12-11 RX ADMIN — NICOTINE 21 MG: 21 PATCH, EXTENDED RELEASE TRANSDERMAL at 08:30

## 2017-12-11 RX ADMIN — QUETIAPINE FUMARATE 600 MG: 300 TABLET ORAL at 22:07

## 2017-12-11 RX ADMIN — DOCUSATE SODIUM 100 MG: 100 CAPSULE, LIQUID FILLED ORAL at 17:15

## 2017-12-11 RX ADMIN — OLANZAPINE 10 MG: 10 TABLET, ORALLY DISINTEGRATING ORAL at 12:14

## 2017-12-11 RX ADMIN — Medication: at 06:23

## 2017-12-11 RX ADMIN — QUETIAPINE FUMARATE 200 MG: 200 TABLET ORAL at 08:24

## 2017-12-11 RX ADMIN — BETAMETHASONE DIPROPIONATE 1 APPLICATION: 0.5 OINTMENT TOPICAL at 17:20

## 2017-12-11 RX ADMIN — ACETAMINOPHEN 975 MG: 325 TABLET, FILM COATED ORAL at 12:16

## 2017-12-11 RX ADMIN — CARBAMAZEPINE 200 MG: 100 TABLET, CHEWABLE ORAL at 08:24

## 2017-12-11 RX ADMIN — BETAMETHASONE DIPROPIONATE: 0.5 OINTMENT TOPICAL at 12:10

## 2017-12-11 RX ADMIN — GABAPENTIN 300 MG: 300 CAPSULE ORAL at 17:15

## 2017-12-11 RX ADMIN — CARBAMAZEPINE 400 MG: 100 TABLET, CHEWABLE ORAL at 17:14

## 2017-12-11 RX ADMIN — Medication 1 APPLICATION: at 17:20

## 2017-12-11 RX ADMIN — PERPHENAZINE 8 MG: 4 TABLET, FILM COATED ORAL at 17:17

## 2017-12-11 RX ADMIN — DOCUSATE SODIUM 100 MG: 100 CAPSULE, LIQUID FILLED ORAL at 08:24

## 2017-12-11 RX ADMIN — PERPHENAZINE 4 MG: 4 TABLET, FILM COATED ORAL at 08:24

## 2017-12-11 NOTE — PROGRESS NOTES
Pt's mood is labile  Pt stripped her bed this morning and thoughts she was leaving  Thoughts remain disorganized at times  Pt's speech was pressured and she was rambling  Pt also paranoid and stating and accusing another pt of talking about her and saying her name  Pt then making comments about how she wants to go to the [facility] and that she needs to be on a medical floor to deal with her medical conditions  Pt made a comment about hitting someone  Pt was medicated with 10 mg po Zyprexa prn

## 2017-12-11 NOTE — PROGRESS NOTES
Progress Note - [address] 40 y o  female MRN: [id number]   Unit/Bed#: OC5 660-44 Encounter: [id number]    Behavior over the last 24 hours: minimal improvement  Naomie Gaspar is still irritable and agitated at times  She still has paranoid delusions about staff - called police this weekend saying that staff stole her book bag and birth certificate  She still has pressured, rambling speech with flight of ideas  Focusing on discharge - became agitated and yelling when informed about not being released today  Has been taking medications  Attends some groups  Sleep: slept better  Appetite: normal  Medication side effects: No   ROS: reports headache, denies any shortness of breath or chest pain    Mental Status Evaluation:    Appearance:  casually dressed   Behavior:  agitated, minimally cooperative   Speech:  pressured, tangential   Mood:  labile, irritable   Affect:  labile   Thought Process:  disorganized, illogical   Associations: tangential associations   Thought Content:  paranoid delusions   Perceptual Disturbances: denies auditory or visual hallucinations when asked   Risk Potential: Suicidal ideation - None  Homicidal ideation - None  Potential for aggression - Not at present   Sensorium:  oriented to person, place and time/date   Memory:  recent and remote memory grossly intact   Consciousness:  alert and awake   Attention: poor concentration and poor attention span   Insight:  poor   Judgment: poor   Gait/Station: normal gait/station and normal balance   Motor Activity: no abnormal movements     Vital signs in last 24 hours:    Temp:  [97 5 °F (36 4 °C)-98 1 °F (36 7 °C)] 98 1 °F (36 7 °C)  HR:  [80-97] 97  Resp:  [16] 16  BP: (144-145)/(85-89) 144/85    Laboratory results:  I have personally reviewed all pertinent laboratory/tests results      Progress Toward Goals: minimal progress, still irritable, still labile, insight remains poor    Assessment/Plan   Principal Problem:    Bipolar I disorder, most recent episode mixed, severe with psychotic features ([facility] [address] )  Active Problems:    Cannabis abuse    UTI (urinary tract infection)    Constipation    Recommended Treatment:     Planned medication and treatment changes: All current active medications have been reviewed  Encourage group therapy, milieu therapy and occupational therapy  Behavioral Health checks every 15 minutes  Increase Trilafon to 4 mg daily and 8 mg in the evening    Continue all other medications:      betamethasone dipropionate  Topical BID   carBAMazepine 200 mg Oral Daily   carBAMazepine 400 mg Oral QPM   docusate sodium 100 mg Oral BID   gabapentin 300 mg Oral BID   nicotine 21 mg Transdermal Daily   [START ON 12/12/2017] perphenazine 4 mg Oral Daily   perphenazine 8 mg Oral QPM   QUEtiapine 200 mg Oral QAM   QUEtiapine 600 mg Oral HS       Risks / Benefits of Treatment:    Risks, benefits, and possible side effects of medications explained to patient and patient verbalizes understanding and agreement for treatment  Risks of medications in pregnancy explained if female patient  Patient verbalizes understanding and agrees to notify her doctor if she becomes pregnant  The patient has a history of at least 3 antipsychotic medication trials and at this time requires treatment with 2 antipsychotic agents due to failed multiple trials of monotherapy  Counseling / Coordination of Care:    Patient's progress discussed with staff in treatment team meeting  Medications, treatment progress and treatment plan reviewed with patient  Medication changes discussed with patient

## 2017-12-11 NOTE — PLAN OF CARE
Problem: Alteration in Thoughts and Perception  Goal: Treatment Goal: Gain control of psychotic behaviors/thinking, reduce/eliminate presenting symptoms and demonstrate improved reality functioning upon discharge  Outcome: Progressing    Goal: Verbalize thoughts and feelings  Interventions:  - Promote a nonjudgmental and trusting relationship with the patient through active listening and therapeutic communication  - Assess patient's level of functioning, behavior and potential for risk  - Engage patient in 1 on 1 interactions for a minimum of 15 minutes each session  - Encourage patient to express fears, feelings, frustrations, and discuss symptoms    - Forest patient to reality, help patient recognize reality-based thinking   - Administer medications as ordered and assess for potential side effects  - Provide the patient education related to the signs and symptoms of the illness and desired effects of prescribed medications   Outcome: Progressing    Goal: Refrain from acting on delusional thinking/internal stimuli  Interventions:  - Monitor patient closely, per order   - Utilize least restrictive measures   - Set reasonable limits, give positive feedback for acceptable   - Administer medications as ordered and monitor of potential side effects   Outcome: Not Progressing    Goal: Agree to be compliant with medication regime, as prescribed and report medication side effects  Interventions:  - Offer appropriate PRN medication and supervise ingestion; conduct aims, as needed    Outcome: Progressing    Goal: Recognize dysfunctional thoughts, communicate reality-based thoughts at the time of discharge  Interventions:  - Provide medication and psycho-education to assist patient in compliance and developing insight into his/her illness    Outcome: Not Progressing    Goal: Complete daily ADLs, including personal hygiene independently, as able  Interventions:  - Observe, teach, and assist patient with ADLS  - Monitor and promote a balance of rest/activity, with adequate nutrition and elimination    Outcome: Progressing      Problem: DISCHARGE PLANNING  Goal: Discharge to home or other facility with appropriate resources  INTERVENTIONS:  - Identify barriers to discharge w/patient and caregiver  - Arrange for needed discharge resources and transportation as appropriate  - Identify discharge learning needs (meds, wound care, etc )  - Refer to Case Management Department for coordinating discharge planning if the patient needs post-hospital services based on physician/advanced practitioner order or complex needs related to functional status, cognitive ability, or social support system   Goal extended 12/1/17  Goal extended on 12/6/2017     Outcome: Progressing      Problem: Nutrition/Hydration-ADULT  Goal: Nutrient/Hydration intake appropriate for improving, restoring or maintaining nutritional needs  Monitor and assess patient's nutrition/hydration status for malnutrition (ex- brittle hair, bruises, dry skin, pale skin and conjunctiva, muscle wasting, smooth red tongue, and disorientation)  Collaborate with interdisciplinary team and initiate plan and interventions as ordered  Monitor patient's weight and dietary intake as ordered or per policy  Utilize nutrition screening tool and intervene per policy  Determine patient's food preferences and provide high-protein, high-caloric foods as appropriate       INTERVENTIONS:  - Monitor oral intake, urinary output, labs, and treatment plans  - Assess nutrition and hydration status and recommend course of action  - Evaluate amount of meals eaten  - Assist patient with eating if necessary   - Allow adequate time for meals  - Recommend/ encourage appropriate diets, oral nutritional supplements, and vitamin/mineral supplements  - Order, calculate, and assess calorie counts as needed  - Recommend, monitor, and adjust tube feedings and TPN/PPN based on assessed needs  - Assess need for intravenous fluids  - Provide specific nutrition/hydration education as appropriate  - Include patient/family/caregiver in decisions related to nutrition   -Goal extended on 12/7/2017   Outcome: Progressing

## 2017-12-12 RX ORDER — PERPHENAZINE 4 MG/1
8 TABLET, FILM COATED ORAL DAILY
Status: DISCONTINUED | OUTPATIENT
Start: 2017-12-13 | End: 2017-12-15 | Stop reason: HOSPADM

## 2017-12-12 RX ADMIN — QUETIAPINE FUMARATE 600 MG: 300 TABLET ORAL at 21:55

## 2017-12-12 RX ADMIN — CARBAMAZEPINE 400 MG: 100 TABLET, CHEWABLE ORAL at 18:01

## 2017-12-12 RX ADMIN — GABAPENTIN 300 MG: 300 CAPSULE ORAL at 18:01

## 2017-12-12 RX ADMIN — GABAPENTIN 300 MG: 300 CAPSULE ORAL at 08:22

## 2017-12-12 RX ADMIN — BETAMETHASONE DIPROPIONATE 1 APPLICATION: 0.5 OINTMENT TOPICAL at 08:23

## 2017-12-12 RX ADMIN — PERPHENAZINE 8 MG: 4 TABLET, FILM COATED ORAL at 18:02

## 2017-12-12 RX ADMIN — PERPHENAZINE 4 MG: 4 TABLET, FILM COATED ORAL at 08:22

## 2017-12-12 RX ADMIN — DOCUSATE SODIUM 100 MG: 100 CAPSULE, LIQUID FILLED ORAL at 18:01

## 2017-12-12 RX ADMIN — BETAMETHASONE DIPROPIONATE: 0.5 OINTMENT TOPICAL at 18:11

## 2017-12-12 RX ADMIN — CARBAMAZEPINE 200 MG: 100 TABLET, CHEWABLE ORAL at 08:22

## 2017-12-12 RX ADMIN — NICOTINE 21 MG: 21 PATCH, EXTENDED RELEASE TRANSDERMAL at 08:49

## 2017-12-12 RX ADMIN — QUETIAPINE FUMARATE 200 MG: 200 TABLET ORAL at 08:22

## 2017-12-12 RX ADMIN — DOCUSATE SODIUM 100 MG: 100 CAPSULE, LIQUID FILLED ORAL at 08:22

## 2017-12-12 NOTE — CASE MANAGEMENT
CATREINA met with pt  Pt calm  Pleasant  Polite  Reported feeling better  Hopeful for D/C soon  Said she had spoken with her mother recently  Said her  came to see her last wk  Denied SI  SW said she'd call her family  Pt concerned about when someone could pick her up  CATERINA advised that there was no set time for D/C  Pt sure that someone from her family would be able to pick her up @ D/C  CATERINA called pt's sister, Fernando Camera, [phone]  CATERINA asked if she's seen or talked to pt recently  Said she spoke to pt either last night or night before  Said she felt pt was close to being @ 100% of her baseline  Said overall, pt was pleasant  Said while on  the phone, she heard pt arguing with a nurse re: her meds  Said that pt was very worried about her meds, in that she knew what she was allergic to, & didn't want to get the wrong meds  Said pt had been doing well  Last admission was 6 yrs ago  She confirmed that pt saw Dr Delilah Colon out pt  Sister confirmed that pt could return home with parents & her kids  Said she'd pick pt up & bring their dad with her @ D/C  CATERINA advised that pt would probably be D/C soon  Sister agreed & will stay in contact with Esvin Hernandez called pt's [facility], Mauro Villegas, [phone], @ [facility]   Left message @ 2:15 PM

## 2017-12-12 NOTE — PROGRESS NOTES
Less irritability and no arguing this evening  Trilafon has been increased and she complied with the increased dose

## 2017-12-12 NOTE — PROGRESS NOTES
Progress Note - [address] 40 y o  female MRN: [id number]   Unit/Bed#: SH8 130-31 Encounter: [id number]    Behavior over the last 24 hours: some improvement  Karen Johns was agitated earlier yesterday and received PRN medications - was calmer in the evening  Today she still has disorganized thoughts and pressured speech, but is more cooperative and more pleasant  Compliant with medications  Attends some groups  Sleep: slept off and on  Appetite: normal  Medication side effects: No   ROS: reports headache, denies any chest pain or abdominal pain    Mental Status Evaluation:    Appearance:  casually dressed   Behavior:  no longer agitated, more cooperative   Speech:  pressured, tangential   Mood:  less labile, less irritable   Affect:  less labile   Thought Process:  disorganized, illogical   Associations: tangential associations   Thought Content:  paranoid delusions   Perceptual Disturbances: denies auditory or visual hallucinations when asked   Risk Potential: Suicidal ideation - None  Homicidal ideation - None  Potential for aggression - Not at present   Sensorium:  oriented to person, place and time/date   Memory:  recent and remote memory grossly intact   Consciousness:  alert and awake   Attention: decreased concentration and decreased attention span   Insight:  impaired   Judgment: impaired   Gait/Station: normal gait/station and normal balance   Motor Activity: no abnormal movements     Vital signs in last 24 hours:    Temp:  [97 5 °F (36 4 °C)-97 7 °F (36 5 °C)] 97 7 °F (36 5 °C)  HR:  [97] 97  Resp:  [16-18] 18  BP: (128-136)/(76-79) 128/76    Laboratory results:  I have personally reviewed all pertinent laboratory/tests results      Progress Toward Goals: slight progress, less irritable, less labile, still paranoid, poor insight    Assessment/Plan   Principal Problem:    Bipolar I disorder, most recent episode mixed, severe with psychotic features ([facility] [place] [address] )  Active Problems: Cannabis abuse    UTI (urinary tract infection)    Constipation    Recommended Treatment:     Planned medication and treatment changes: All current active medications have been reviewed  Encourage group therapy, milieu therapy and occupational therapy  Behavioral Health checks every 15 minutes    to contact family regarding progress and goals for discharge  Increase Trilafon to 8 mg bid    Continue all other medications:      betamethasone dipropionate  Topical BID   carBAMazepine 200 mg Oral Daily   carBAMazepine 400 mg Oral QPM   docusate sodium 100 mg Oral BID   gabapentin 300 mg Oral BID   nicotine 21 mg Transdermal Daily   perphenazine 8 mg Oral QPM   [START ON 12/13/2017] perphenazine 8 mg Oral Daily   QUEtiapine 200 mg Oral QAM   QUEtiapine 600 mg Oral HS       Risks / Benefits of Treatment:    Risks, benefits, and possible side effects of medications explained to patient  Patient has limited understanding of risks and benefits of treatment at this time, but agrees to take medications as prescribed  Risks of medications in pregnancy explained if female patient  Patient verbalizes understanding and agrees to notify her doctor if she becomes pregnant  The patient has a history of at least 3 antipsychotic medication trials and at this time requires treatment with 2 antipsychotic agents due to failed multiple trials of monotherapy  Counseling / Coordination of Care:    Patient's progress discussed with staff in treatment team meeting  Medications, treatment progress and treatment plan reviewed with patient  Medication changes discussed with patient

## 2017-12-12 NOTE — PLAN OF CARE
Problem: Alteration in Thoughts and Perception  Goal: Treatment Goal: Gain control of psychotic behaviors/thinking, reduce/eliminate presenting symptoms and demonstrate improved reality functioning upon discharge  Outcome: Progressing    Goal: Verbalize thoughts and feelings  Interventions:  - Promote a nonjudgmental and trusting relationship with the patient through active listening and therapeutic communication  - Assess patient's level of functioning, behavior and potential for risk  - Engage patient in 1 on 1 interactions for a minimum of 15 minutes each session  - Encourage patient to express fears, feelings, frustrations, and discuss symptoms    - [place] patient to reality, help patient recognize reality-based thinking   - Administer medications as ordered and assess for potential side effects  - Provide the patient education related to the signs and symptoms of the illness and desired effects of prescribed medications   Outcome: Progressing    Goal: Refrain from acting on delusional thinking/internal stimuli  Interventions:  - Monitor patient closely, per order   - Utilize least restrictive measures   - Set reasonable limits, give positive feedback for acceptable   - Administer medications as ordered and monitor of potential side effects   Outcome: Progressing    Goal: Agree to be compliant with medication regime, as prescribed and report medication side effects  Interventions:  - Offer appropriate PRN medication and supervise ingestion; conduct aims, as needed    Outcome: Progressing    Goal: Recognize dysfunctional thoughts, communicate reality-based thoughts at the time of discharge  Interventions:  - Provide medication and psycho-education to assist patient in compliance and developing insight into his/her illness    Outcome: Progressing    Goal: Complete daily ADLs, including personal hygiene independently, as able  Interventions:  - Observe, teach, and assist patient with ADLS  - Monitor and promote a balance of rest/activity, with adequate nutrition and elimination    Outcome: Progressing      Problem: DISCHARGE PLANNING  Goal: Discharge to home or other facility with appropriate resources  INTERVENTIONS:  - Identify barriers to discharge w/patient and caregiver  - Arrange for needed discharge resources and transportation as appropriate  - Identify discharge learning needs (meds, wound care, etc )  - Refer to Case Management Department for coordinating discharge planning if the patient needs post-hospital services based on physician/advanced practitioner order or complex needs related to functional status, cognitive ability, or social support system   Goal extended 12/1/17  Goal extended on 12/6/2017     Outcome: Progressing      Problem: Ineffective Coping  Goal: Participates in unit activities  Interventions:  - Provide therapeutic environment   - Provide required programming   - Redirect inappropriate behaviors    Outcome: Progressing      Problem: Nutrition/Hydration-ADULT  Goal: Nutrient/Hydration intake appropriate for improving, restoring or maintaining nutritional needs  Monitor and assess patient's nutrition/hydration status for malnutrition (ex- brittle hair, bruises, dry skin, pale skin and conjunctiva, muscle wasting, smooth red tongue, and disorientation)  Collaborate with interdisciplinary team and initiate plan and interventions as ordered  Monitor patient's weight and dietary intake as ordered or per policy  Utilize nutrition screening tool and intervene per policy  Determine patient's food preferences and provide high-protein, high-caloric foods as appropriate       INTERVENTIONS:  - Monitor oral intake, urinary output, labs, and treatment plans  - Assess nutrition and hydration status and recommend course of action  - Evaluate amount of meals eaten  - Assist patient with eating if necessary   - Allow adequate time for meals  - Recommend/ encourage appropriate diets, oral nutritional supplements, and vitamin/mineral supplements  - Order, calculate, and assess calorie counts as needed  - Recommend, monitor, and adjust tube feedings and TPN/PPN based on assessed needs  - Assess need for intravenous fluids  - Provide specific nutrition/hydration education as appropriate  - Include patient/family/caregiver in decisions related to nutrition   -Goal extended on 12/7/2017   Outcome: Progressing      Problem: SUBSTANCE USE/ABUSE  Goal: By discharge, will develop insight into their chemical dependency and sustain motivation to continue in recovery  INTERVENTIONS:  - Attends all daily group sessions and scheduled AA groups  - Actively practices coping skills through participation in the therapeutic community and adherence to program rules  - Reviews and completes assignments from individual treatment plan  - Assist patient development of understanding of their personal cycle of addiction and relapse triggers   Outcome: Progressing    Goal: By discharge, patient will have ongoing treatment plan addressing chemical dependency  INTERVENTIONS:  - Assist patient with resources and/or appointments for ongoing recovery based living   Outcome: Progressing

## 2017-12-12 NOTE — PROGRESS NOTES
Patient denies SI/HI and any hallucinations at this time  Patient asked about feeling agitated yesterday and stated, "If I go home today, fine otherwise I won't get upset " Patient took her medications without difficulty and out in milieu at this time

## 2017-12-13 RX ADMIN — BETAMETHASONE DIPROPIONATE: 0.5 OINTMENT TOPICAL at 21:31

## 2017-12-13 RX ADMIN — GABAPENTIN 300 MG: 300 CAPSULE ORAL at 18:06

## 2017-12-13 RX ADMIN — CARBAMAZEPINE 200 MG: 100 TABLET, CHEWABLE ORAL at 08:27

## 2017-12-13 RX ADMIN — GABAPENTIN 300 MG: 300 CAPSULE ORAL at 08:27

## 2017-12-13 RX ADMIN — PERPHENAZINE 8 MG: 4 TABLET, FILM COATED ORAL at 08:27

## 2017-12-13 RX ADMIN — Medication 1 APPLICATION: at 22:04

## 2017-12-13 RX ADMIN — QUETIAPINE FUMARATE 600 MG: 300 TABLET ORAL at 21:30

## 2017-12-13 RX ADMIN — NICOTINE 21 MG: 21 PATCH, EXTENDED RELEASE TRANSDERMAL at 08:30

## 2017-12-13 RX ADMIN — QUETIAPINE FUMARATE 200 MG: 200 TABLET ORAL at 08:27

## 2017-12-13 RX ADMIN — PERPHENAZINE 8 MG: 4 TABLET, FILM COATED ORAL at 18:06

## 2017-12-13 RX ADMIN — DOCUSATE SODIUM 100 MG: 100 CAPSULE, LIQUID FILLED ORAL at 08:27

## 2017-12-13 RX ADMIN — DOCUSATE SODIUM 100 MG: 100 CAPSULE, LIQUID FILLED ORAL at 18:07

## 2017-12-13 RX ADMIN — CARBAMAZEPINE 400 MG: 100 TABLET, CHEWABLE ORAL at 18:05

## 2017-12-13 RX ADMIN — BETAMETHASONE DIPROPIONATE 1 APPLICATION: 0.5 OINTMENT TOPICAL at 09:02

## 2017-12-13 NOTE — PLAN OF CARE
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
## 2017-12-13 NOTE — CASE MANAGEMENT
CATERINA called pt's sister, Aliya Edmonds, re: D/C  Left message @ 1 PM  CATERINA called [facility], to schedule appt with Dr Lisa Benito  Spoke to Aguilar Moya, who scheduled appt with therapist, Phoenix Palumbo, on 12/18 @ 11 AM & on 1/2/2018 @ 7:20 PM with Dr Lisa Benito

## 2017-12-13 NOTE — PLAN OF CARE
Alteration in Thoughts and Perception     Treatment Goal: Gain control of psychotic behaviors/thinking, reduce/eliminate presenting symptoms and demonstrate improved reality functioning upon discharge Progressing     Verbalize thoughts and feelings Progressing     Refrain from acting on delusional thinking/internal stimuli Progressing     Agree to be compliant with medication regime, as prescribed and report medication side effects Progressing     Recognize dysfunctional thoughts, communicate reality-based thoughts at the time of discharge Progressing     Complete daily ADLs, including personal hygiene independently, as able Progressing        SUBSTANCE USE/ABUSE     By discharge, will develop insight into their chemical dependency and sustain motivation to continue in recovery Progressing     By discharge, patient will have ongoing treatment plan addressing chemical dependency Progressing

## 2017-12-13 NOTE — PROGRESS NOTES
Pt OOB, visible and social with peers, denies SI, HI, continues to report anxiety and depression, reports to this writer, " I am more positive towards the future, I am looking forward to going home and being with my daughter and my boyfriend"  No agitation or outburst this shift, less intrusive, will continue to monitor

## 2017-12-13 NOTE — PROGRESS NOTES
Progress Note - [address] 40 y o  female MRN: [id number]   Unit/Bed#: QU9 314-58 Encounter: [id number]    Behavior over the last 24 hours: improving  Sol Blades is doing better  She is calm, cooperative and pleasant during the session today  Seems less paranoid today, no longer has irritability, her affect is brighter  Compliant with medications  Follows directions more appropriately  Attends group therapy  Sleep: slept better  Appetite: normal  Medication side effects: No   ROS: reports headache, denies any chest pain or abdominal pain    Mental Status Evaluation:    Appearance:  casually dressed   Behavior:  cooperative, more pleasant   Speech:  less tangential, less pressured   Mood:  less labile   Affect:  less labile   Thought Process:  more organized, more logical   Associations: intact associations   Thought Content:  less paranoid   Perceptual Disturbances: no auditory hallucinations, no visual hallucinations   Risk Potential: Suicidal ideation - None  Homicidal ideation - None  Potential for aggression - No   Sensorium:  oriented to person, place and time/date   Memory:  recent and remote memory grossly intact   Consciousness:  alert and awake   Attention: attention span and concentration are improving   Insight:  improving and partial   Judgment: improving and partial   Gait/Station: normal gait/station and normal balance   Motor Activity: no abnormal movements     Vital signs in last 24 hours:    Temp:  [98 1 °F (36 7 °C)] 98 1 °F (36 7 °C)  HR:  [90-95] 90  Resp:  [16] 16  BP: (134-151)/(78-87) 134/78    Laboratory results:  I have personally reviewed all pertinent laboratory/tests results      Progress Toward Goals: progressing, insight is slowly improving, mood is stabilizing, no longer irritable, less labile    Assessment/Plan   Principal Problem:    Bipolar I disorder, most recent episode mixed, severe with psychotic features ([email or web address] )  Active Problems:    Cannabis abuse UTI (urinary tract infection)    Constipation    Recommended Treatment:     Planned medication and treatment changes: All current active medications have been reviewed  Continue treatment with group therapy, milieu therapy and occupational therapy  Behavioral Health checks every 15 minutes  Discharge planning  Family feels she is getting close to her baseline now  Continue current medications:      betamethasone dipropionate  Topical BID   carBAMazepine 200 mg Oral Daily   carBAMazepine 400 mg Oral QPM   docusate sodium 100 mg Oral BID   gabapentin 300 mg Oral BID   nicotine 21 mg Transdermal Daily   perphenazine 8 mg Oral QPM   perphenazine 8 mg Oral Daily   QUEtiapine 200 mg Oral QAM   QUEtiapine 600 mg Oral HS       Risks / Benefits of Treatment:    Risks, benefits, and possible side effects of medications explained to patient and patient verbalizes understanding and agreement for treatment  Risks of medications in pregnancy explained if female patient  Patient verbalizes understanding and agrees to notify her doctor if she becomes pregnant  The patient has a history of at least 3 antipsychotic medication trials and at this time requires treatment with 2 antipsychotic agents due to failed multiple trials of monotherapy  Counseling / Coordination of Care:    Patient's progress discussed with staff in treatment team meeting  Medications, treatment progress and treatment plan reviewed with patient  Discharge plan discussed with patient

## 2017-12-14 RX ADMIN — CARBAMAZEPINE 200 MG: 100 TABLET, CHEWABLE ORAL at 09:44

## 2017-12-14 RX ADMIN — CARBAMAZEPINE 400 MG: 100 TABLET, CHEWABLE ORAL at 18:22

## 2017-12-14 RX ADMIN — PERPHENAZINE 8 MG: 4 TABLET, FILM COATED ORAL at 18:22

## 2017-12-14 RX ADMIN — NICOTINE 21 MG: 21 PATCH, EXTENDED RELEASE TRANSDERMAL at 09:45

## 2017-12-14 RX ADMIN — BETAMETHASONE DIPROPIONATE 1 APPLICATION: 0.5 OINTMENT TOPICAL at 09:45

## 2017-12-14 RX ADMIN — GABAPENTIN 300 MG: 300 CAPSULE ORAL at 18:22

## 2017-12-14 RX ADMIN — QUETIAPINE FUMARATE 600 MG: 300 TABLET ORAL at 22:11

## 2017-12-14 RX ADMIN — GABAPENTIN 300 MG: 300 CAPSULE ORAL at 09:44

## 2017-12-14 RX ADMIN — DOCUSATE SODIUM 100 MG: 100 CAPSULE, LIQUID FILLED ORAL at 18:22

## 2017-12-14 RX ADMIN — PERPHENAZINE 8 MG: 4 TABLET, FILM COATED ORAL at 09:44

## 2017-12-14 RX ADMIN — BETAMETHASONE DIPROPIONATE 1 APPLICATION: 0.5 OINTMENT TOPICAL at 18:21

## 2017-12-14 RX ADMIN — QUETIAPINE FUMARATE 200 MG: 200 TABLET ORAL at 09:44

## 2017-12-14 NOTE — CASE MANAGEMENT
CATERINA met with pt  Pt signed Medicare D/C agreement form  Pt rec'd copy  SW said she spoke with her sister  Said sister would pick her up tomorrow @ 3 PM  Pt disagreed, & said that her man was picking her up  Said she didn't want sister to have to pay for gas  Said BF would pick her up before he went to work tomorrow @ 4 PM  SW asked pt to talk to sister re: above  CATERINA called pt's sister & advised of above  She said it would be OK if BF picked pt up

## 2017-12-14 NOTE — PLAN OF CARE
Alteration in Thoughts and Perception     Treatment Goal: Gain control of psychotic behaviors/thinking, reduce/eliminate presenting symptoms and demonstrate improved reality functioning upon discharge Progressing     Verbalize thoughts and feelings Progressing     Refrain from acting on delusional thinking/internal stimuli Progressing     Agree to be compliant with medication regime, as prescribed and report medication side effects Progressing     Recognize dysfunctional thoughts, communicate reality-based thoughts at the time of discharge Progressing     Complete daily ADLs, including personal hygiene independently, as able [address] Bonilla Discharge to home or other facility with appropriate resources Progressing        Ineffective Coping     Participates in unit activities Progressing        SUBSTANCE USE/ABUSE     By discharge, will develop insight into their chemical dependency and sustain motivation to continue in recovery Progressing     By discharge, patient will have ongoing treatment plan addressing chemical dependency Progressing

## 2017-12-14 NOTE — DISCHARGE INSTR - OTHER ORDERS
[facility] # - [phone]    Eve Kirby, blended  thru [facility] - [phone], ext [phone]                                 - Stay in contact with [address]    [facility] - [phone]                                                             - Call to schedule appt for intake for help (re: your marijuana use)       /

## 2017-12-14 NOTE — PROGRESS NOTES
Progress Note - [address] 40 y o  female MRN: [id number]   Unit/Bed#: TZ5 031-41 Encounter: [id number]    Behavior over the last 24 hours: improved  Lluvia Pemberton is doing well  Calm and pleasant during the session, has bright, appropriate affect  Speech is no longer pressured, her thoughts are clear and more organized  Compliant with medications  Participates appropriately in group therapy  Sleep: normal  Appetite: normal  Medication side effects: No   ROS: reports arm pain, denies any chest pain or abdominal pain    Mental Status Evaluation:    Appearance:  dressed appropriately   Behavior:  pleasant, cooperative   Speech:  normal rate and volume, more organized   Mood:  euthymic   Affect:  appropriate   Thought Process:  organized, goal directed   Associations: intact associations   Thought Content:  no overt delusions, paranoid ideation is resolved   Perceptual Disturbances: no auditory hallucinations, no visual hallucinations   Risk Potential: Suicidal ideation - None  Homicidal ideation - None  Potential for aggression - No   Sensorium:  oriented to person, place and time/date   Memory:  recent and remote memory grossly intact   Consciousness:  alert and awake   Attention: attention span and concentration are improving   Insight:  improving and moderate   Judgment: improving and moderate   Gait/Station: normal gait/station and normal balance   Motor Activity: no abnormal movements     Vital signs in last 24 hours:    Temp:  [97 9 °F (36 6 °C)-98 7 °F (37 1 °C)] 98 7 °F (37 1 °C)  HR:  [] 80  Resp:  [16] 16  BP: (133-156)/(72-95) 144/85    Laboratory results:  I have personally reviewed all pertinent laboratory/tests results      Progress Toward Goals: progressing, achieving admission goals, discharge planning    Assessment/Plan   Principal Problem:    Bipolar I disorder, most recent episode mixed, severe with psychotic features [facility]  Active Problems:    Cannabis abuse    UTI (urinary tract infection)    Constipation    Recommended Treatment:     Planned medication and treatment changes: All current active medications have been reviewed  Continue treatment with group therapy, milieu therapy and occupational therapy  Behavioral Health checks every 15 minutes  Possible discharge tomorrow if continues to improve  Continue current medications:      betamethasone dipropionate  Topical BID   carBAMazepine 200 mg Oral Daily   carBAMazepine 400 mg Oral QPM   docusate sodium 100 mg Oral BID   gabapentin 300 mg Oral BID   nicotine 21 mg Transdermal Daily   perphenazine 8 mg Oral QPM   perphenazine 8 mg Oral Daily   QUEtiapine 200 mg Oral QAM   QUEtiapine 600 mg Oral HS       Risks / Benefits of Treatment:    Risks, benefits, and possible side effects of medications explained to patient and patient verbalizes understanding and agreement for treatment  Risks of medications in pregnancy explained if female patient  Patient verbalizes understanding and agrees to notify her doctor if she becomes pregnant  The patient has a history of at least 3 antipsychotic medication trials and at this time requires treatment with 2 antipsychotic agents due to failed multiple trials of monotherapy  Counseling / Coordination of Care:    Patient's progress discussed with staff in treatment team meeting  Medications, treatment progress and treatment plan reviewed with patient  Discharge plan discussed with patient

## 2017-12-14 NOTE — PROGRESS NOTES
Patient states she is ready for discharge  Patient also states she understands that she has consequences to face for her actions prior to admittion  Patient states she is ready to do whatever it takes to accept responsibility for her actions  Patient has a plan to maintain her medication with pill boxes and reminders on her phone    Patient brighter and compliant on unit

## 2017-12-14 NOTE — CASE MANAGEMENT
CATERINA rec'd message from pt's sister, Lucy Nathan, on 12/13/2017  CATERINA rec'd call from Cha, [address], @ [facility]  Said she had to cancel therapist appt & re-schedule 's appt  Said pt could see Dr Coy Nuno on Sat, 12/16 @ 11:30 AM  An appt to see her therapist, Reba Randolph, will be scheduled at that time  Lebron Bateman for the change of appts  CATERINA called pt's sister, Fawn Saenz, [phone]  CATERINA confirmed pt's D/C for tomorrow  CATERINA asked what timer she could pick pt up  Sister said that pt talked about her BF picking her up, & asked if that was OK  CATERINA said it was preferred if she would pick pt up  Sister in agreement  Said she'd come to pick pt up @ 3 PM tomorrow  Christina Edwards

## 2017-12-15 VITALS
RESPIRATION RATE: 16 BRPM | OXYGEN SATURATION: 99 % | BODY MASS INDEX: 34.99 KG/M2 | TEMPERATURE: 97.8 F | HEART RATE: 80 BPM | WEIGHT: 210 LBS | DIASTOLIC BLOOD PRESSURE: 61 MMHG | HEIGHT: 65 IN | SYSTOLIC BLOOD PRESSURE: 123 MMHG

## 2017-12-15 PROBLEM — K59.00 CONSTIPATION: Status: RESOLVED | Noted: 2017-11-30 | Resolved: 2017-12-15

## 2017-12-15 PROBLEM — N39.0 UTI (URINARY TRACT INFECTION): Status: RESOLVED | Noted: 2017-11-30 | Resolved: 2017-12-15

## 2017-12-15 RX ORDER — GABAPENTIN 300 MG/1
300 CAPSULE ORAL 2 TIMES DAILY
Qty: 60 CAPSULE | Refills: 0 | Status: ON HOLD | OUTPATIENT
Start: 2017-12-15 | End: 2018-01-09

## 2017-12-15 RX ORDER — QUETIAPINE FUMARATE 200 MG/1
200 TABLET, FILM COATED ORAL EVERY MORNING
Qty: 30 TABLET | Refills: 0 | Status: SHIPPED | OUTPATIENT
Start: 2017-12-16 | End: 2018-01-09 | Stop reason: HOSPADM

## 2017-12-15 RX ORDER — HYDROXYZINE 50 MG/1
50 TABLET, FILM COATED ORAL 2 TIMES DAILY PRN
Qty: 60 TABLET | Refills: 0 | Status: ON HOLD | OUTPATIENT
Start: 2017-12-15 | End: 2018-01-09

## 2017-12-15 RX ORDER — DOCUSATE SODIUM 100 MG/1
100 CAPSULE, LIQUID FILLED ORAL 2 TIMES DAILY
Qty: 60 CAPSULE | Refills: 0 | Status: SHIPPED | OUTPATIENT
Start: 2017-12-15 | End: 2018-01-09 | Stop reason: HOSPADM

## 2017-12-15 RX ORDER — BETAMETHASONE DIPROPIONATE 0.05 %
OINTMENT (GRAM) TOPICAL 2 TIMES DAILY
Qty: 15 G | Refills: 0 | Status: SHIPPED | OUTPATIENT
Start: 2017-12-15 | End: 2018-01-14

## 2017-12-15 RX ORDER — QUETIAPINE FUMARATE 300 MG/1
600 TABLET, FILM COATED ORAL
Qty: 60 TABLET | Refills: 0 | Status: SHIPPED | OUTPATIENT
Start: 2017-12-15 | End: 2018-01-09 | Stop reason: HOSPADM

## 2017-12-15 RX ORDER — PERPHENAZINE 8 MG
8 TABLET ORAL 2 TIMES DAILY
Qty: 60 TABLET | Refills: 0 | Status: SHIPPED | OUTPATIENT
Start: 2017-12-15 | End: 2018-01-09 | Stop reason: HOSPADM

## 2017-12-15 RX ORDER — CARBAMAZEPINE 200 MG/1
200 TABLET ORAL DAILY
Qty: 90 TABLET | Refills: 0 | Status: SHIPPED | OUTPATIENT
Start: 2017-12-15 | End: 2018-01-09 | Stop reason: HOSPADM

## 2017-12-15 RX ORDER — AMMONIUM LACTATE 12 G/100G
CREAM TOPICAL 2 TIMES DAILY PRN
Qty: 385 G | Refills: 0 | Status: SHIPPED | OUTPATIENT
Start: 2017-12-15 | End: 2018-01-14

## 2017-12-15 RX ADMIN — GABAPENTIN 300 MG: 300 CAPSULE ORAL at 08:43

## 2017-12-15 RX ADMIN — DOCUSATE SODIUM 100 MG: 100 CAPSULE, LIQUID FILLED ORAL at 08:43

## 2017-12-15 RX ADMIN — QUETIAPINE FUMARATE 200 MG: 200 TABLET ORAL at 08:43

## 2017-12-15 RX ADMIN — CARBAMAZEPINE 200 MG: 100 TABLET, CHEWABLE ORAL at 08:43

## 2017-12-15 RX ADMIN — NICOTINE 21 MG: 21 PATCH, EXTENDED RELEASE TRANSDERMAL at 08:47

## 2017-12-15 RX ADMIN — PERPHENAZINE 8 MG: 4 TABLET, FILM COATED ORAL at 08:42

## 2017-12-15 NOTE — PLAN OF CARE
Alteration in Thoughts and Perception     Treatment Goal: Gain control of psychotic behaviors/thinking, reduce/eliminate presenting symptoms and demonstrate improved reality functioning upon discharge Completed     Verbalize thoughts and feelings Completed     Refrain from acting on delusional thinking/internal stimuli Completed     Agree to be compliant with medication regime, as prescribed and report medication side effects Completed     Recognize dysfunctional thoughts, communicate reality-based thoughts at the time of discharge Completed     Complete daily ADLs, including personal hygiene independently, as able Completed        DISCHARGE PLANNING     Discharge to home or other facility with appropriate resources Completed        Ineffective Coping     Participates in unit activities Completed        SUBSTANCE USE/ABUSE     By discharge, will develop insight into their chemical dependency and sustain motivation to continue in recovery Completed     By discharge, patient will have ongoing treatment plan addressing chemical dependency Completed

## 2017-12-15 NOTE — NURSING NOTE
AVS and upcoming appointments reviewed with patient  Patient discharged with belongings, prescriptions, and instructions in the company of her boyfriend

## 2017-12-15 NOTE — DISCHARGE INSTR - LAB
Contact Information: If you have any questions, concerns, pended studies, tests and/or procedures, or emergencies regarding your inpatient behavioral health visit  Please contact [facility] [phone] and ask to speak to a , nurse or physician  A contact is available 24 hours/ 7 days a week at this number  Summary of Procedures Performed During your Stay:  Below is a list of major procedures performed during your hospital stay and a summary of results:  - No major procedures performed  Pending Studies     Start     Ordered    12/15/17 0000  Carbamazepine level, total      12/15/17 0942    12/15/17 0000  CBC and differential      12/15/17 0942    12/15/17 0000  Comprehensive metabolic panel      23/68/02 0942        If studies are pending at discharge, follow up with your PCP and/or referring provider

## 2017-12-17 ENCOUNTER — HOSPITAL ENCOUNTER (EMERGENCY)
Facility: HOSPITAL | Age: 37
Discharge: HOME | End: 2017-12-17
Attending: EMERGENCY MEDICINE | Admitting: EMERGENCY MEDICINE
Payer: MEDICARE

## 2017-12-17 VITALS
RESPIRATION RATE: 18 BRPM | OXYGEN SATURATION: 100 % | HEART RATE: 84 BPM | DIASTOLIC BLOOD PRESSURE: 93 MMHG | SYSTOLIC BLOOD PRESSURE: 161 MMHG | TEMPERATURE: 97.3 F

## 2017-12-17 DIAGNOSIS — Z32.02 PREGNANCY TEST NEGATIVE: Primary | ICD-10-CM

## 2017-12-17 LAB
ANION GAP BLD CALC-SCNC: 16 MMOL/L (ref 4–13)
BUN BLD-MCNC: 6 MG/DL (ref 5–25)
CA-I BLD-SCNC: 1.15 MMOL/L (ref 1.12–1.32)
CHLORIDE BLD-SCNC: 103 MMOL/L (ref 100–108)
CLARITY, POC: CLEAR
COLOR, POC: YELLOW
CREAT BLD-MCNC: 0.7 MG/DL (ref 0.6–1.3)
EXT BILIRUBIN, UA: ABNORMAL
EXT BLOOD URINE: ABNORMAL
EXT GLUCOSE, UA: NEGATIVE
EXT KETONES: ABNORMAL
EXT NITRITE, UA: NEGATIVE
EXT PH, UA: 6.5
EXT PREG TEST URINE: NORMAL
EXT PROTEIN, UA: ABNORMAL
EXT SPECIFIC GRAVITY, UA: 1.01
EXT UROBILINOGEN: 0.2
GFR SERPL CREATININE-BSD FRML MDRD: 112 ML/MIN/1.73SQ M
GLUCOSE SERPL-MCNC: 126 MG/DL (ref 65–140)
HCG SERPL QL: NEGATIVE
HCT VFR BLD CALC: 40 % (ref 34.8–46.1)
HGB BLDA-MCNC: 13.6 G/DL (ref 11.5–15.4)
PCO2 BLD: 25 MMOL/L (ref 21–32)
POTASSIUM BLD-SCNC: 3.8 MMOL/L (ref 3.5–5.3)
SODIUM BLD-SCNC: 140 MMOL/L (ref 136–145)
SPECIMEN SOURCE: ABNORMAL
WBC # BLD EST: ABNORMAL 10*3/UL

## 2017-12-17 PROCEDURE — 99284 EMERGENCY DEPT VISIT MOD MDM: CPT

## 2017-12-17 PROCEDURE — 84703 CHORIONIC GONADOTROPIN ASSAY: CPT | Performed by: EMERGENCY MEDICINE

## 2017-12-17 PROCEDURE — 81002 URINALYSIS NONAUTO W/O SCOPE: CPT | Performed by: EMERGENCY MEDICINE

## 2017-12-17 PROCEDURE — 85014 HEMATOCRIT: CPT

## 2017-12-17 PROCEDURE — 81025 URINE PREGNANCY TEST: CPT | Performed by: EMERGENCY MEDICINE

## 2017-12-17 PROCEDURE — 80047 BASIC METABLC PNL IONIZED CA: CPT

## 2017-12-17 PROCEDURE — 36415 COLL VENOUS BLD VENIPUNCTURE: CPT | Performed by: EMERGENCY MEDICINE

## 2017-12-17 NOTE — ED NOTES
Patient argumentative with staff, this RN went in to assist staff member [facility]) - and assess patient    Patient states she is angry, arms crossed, complaining about patient's "being brought in before me", patient stated she has "been her for 5 hours" - not accurate - patient also states "I am dehydrated and I need IV fluids", patient also states she is pregnant but would not disclose how far along she is  Patient appears well, no vomiting noted  Patient given education about triage level and need to take patient's back in order of severity of illness  Patient states "I'm not stupid, you are talking to me like I'm stupid"  Apologized for patient's wait and patient unwilling to cooperate with assessment at this time, assessment deferred       David Cantu RN  12/17/17 8970
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
## 2017-12-17 NOTE — ED NOTES
Pt was brought back to room and began immediately arguing with staff  Pt states "I am pregnant, I'm not a fucking retard, I should have been brought back earlier, I came from the ambulance  I've been here for 5 hours " Pt was asked how far along she was, she avoided answering the question by saying "I found out at [employer], I have a baby daddy, I'm not a fucking criminal, catch me on facebook live " Staff left the pt's room to give her time to calm down        Meghan Shirley  12/17/17 1815

## 2017-12-18 NOTE — DISCHARGE INSTRUCTIONS
Urine pregnancy test   GENERAL INFORMATION:  What is this test?  This test measures a hormone called human chorionic gonadotropin (hCG) in urine  This test is used when pregnancy is suspected  Why do I need this test?  Laboratory tests may be done for many reasons  Tests are performed for routine health screenings or if a disease or toxicity is suspected  Lab tests may be used to determine if a medical condition is improving or worsening  Lab tests may also be used to measure the success or failure of a medication or treatment plan  Lab tests may be ordered for professional or legal reasons  You may need this test if you have:   · Possible pregnancy  When and how often should I have this test?  When and how often laboratory tests are done may depend on many factors  The timing of laboratory tests may rely on the results or completion of other tests, procedures, or treatments  Lab tests may be performed immediately in an emergency, or tests may be delayed as a condition is treated or monitored  A test may be suggested or become necessary when certain signs or symptoms appear  Due to changes in the way your body naturally functions through the course of a day, lab tests may need to be performed at a certain time of day  If you have prepared for a test by changing your food or fluid intake, lab tests may be timed in accordance with those changes  Timing of tests may be based on increased and decreased levels of medications, drugs or other substances in the body  The age or gender of the person being tested may affect when and how often a lab test is required  Chronic or progressive conditions may need ongoing monitoring through the use of lab tests  Conditions that worsen and improve may also need frequent monitoring  Certain tests may be repeated to obtain a series of results, or tests may need to be repeated to confirm or disprove results   Timing and frequency of lab tests may vary if they are performed for professional or legal reasons  How should I get ready for the test?  To prepare for giving a urine sample, be sure to drink enough fluids before the test, unless you have been given other instructions  Try not to empty your bladder before the test   How is the test done? To provide a sample of urine, you will be asked to urinate into a container  Fill the container as much as you can, but do not overfill it  Urine samples may also be taken from a catheter  A sample of your first urination in the morning is preferred for this test   How will the test feel? The amount of discomfort you feel will depend on many factors, including your sensitivity to pain  Communicate how you are feeling with the person doing the test  Inform the person doing the test if you feel that you cannot continue with the test   This test usually causes no discomfort  What should I do after the test?  After collecting a urine sample, close the container if it has a lid  Place the container where the healthcare worker asked you to put it  Clean your hands with soap and water  What are the risks? Urine: A urine test is generally considered safe  Talk to your healthcare worker if you have questions or concerns about this test   What are normal results for this test?  Laboratory test results may vary depending on your age, gender, health history, the method used for the test, and many other factors  If your results are different from the results suggested below, this may not mean that you have a disease  Contact your healthcare worker if you have any questions  The following is considered to be a normal result for this test:  · Negative  What follow up should I do after this test?  Ask your healthcare worker how you will be informed of the test results  You may be asked to call for results, schedule an appointment to discuss results, or notified of results by mail   Follow up care varies depending on many factors related to your test  Sometimes there is no follow up after you have been notified of test results  At other times follow up may be suggested or necessary  Some examples of follow up care include changes to medication or treatment plans, referral to a specialist, more or less frequent monitoring, and additional tests or procedures  Talk with your healthcare worker about any concerns or questions you have regarding follow up care or instructions  Where can I get more information? Related Companies   ¨ [employer] - http://ReelSurfer/  Kiran Frances [employer] - http://www  aafp org  ¨ March of [address] - [address] be  com    CARE AGREEMENT:   You have the right to help plan your care  To help with this plan, you must learn about your health condition and how it may be treated  You can then discuss treatment options with your caregivers  Work with them to decide what care may be used to treat you  You always have the right to refuse treatment  © 2017 [address]  Information is for End User's use only and may not be sold, redistributed or otherwise used for commercial purposes  The above information is an  only  It is not intended as a substitute for medical advice for your individual conditions or treatments  Talk to your doctor, nurse or pharmacist before following any medical regimen to see if it is safe and effective for you

## 2017-12-21 ENCOUNTER — HOSPITAL ENCOUNTER (INPATIENT)
Facility: HOSPITAL | Age: 37
LOS: 19 days | Discharge: HOME | DRG: 885 | End: 2018-01-09
Attending: PSYCHIATRY & NEUROLOGY | Admitting: PSYCHIATRY & NEUROLOGY
Payer: MEDICARE

## 2017-12-21 ENCOUNTER — HOSPITAL ENCOUNTER (EMERGENCY)
Facility: HOSPITAL | Age: 37
End: 2017-12-21
Attending: EMERGENCY MEDICINE | Admitting: EMERGENCY MEDICINE
Payer: MEDICARE

## 2017-12-21 VITALS
DIASTOLIC BLOOD PRESSURE: 81 MMHG | OXYGEN SATURATION: 98 % | TEMPERATURE: 98.3 F | SYSTOLIC BLOOD PRESSURE: 150 MMHG | RESPIRATION RATE: 18 BRPM | HEART RATE: 75 BPM

## 2017-12-21 DIAGNOSIS — K59.00 CONSTIPATION, UNSPECIFIED CONSTIPATION TYPE: ICD-10-CM

## 2017-12-21 DIAGNOSIS — F31.64 BIPOLAR I DISORDER, MOST RECENT EPISODE MIXED, SEVERE WITH PSYCHOTIC FEATURES (HCC): Primary | Chronic | ICD-10-CM

## 2017-12-21 DIAGNOSIS — F17.200 NICOTINE DEPENDENCE: ICD-10-CM

## 2017-12-21 DIAGNOSIS — L85.3 XEROSIS OF SKIN: ICD-10-CM

## 2017-12-21 DIAGNOSIS — F29 PSYCHOSIS (HCC): Primary | ICD-10-CM

## 2017-12-21 DIAGNOSIS — F41.9 ANXIETY: ICD-10-CM

## 2017-12-21 LAB
AMPHETAMINES SERPL QL SCN: NEGATIVE
BARBITURATES UR QL: NEGATIVE
BENZODIAZ UR QL: NEGATIVE
COCAINE UR QL: NEGATIVE
EXT BILIRUBIN, UA: ABNORMAL
EXT BLOOD URINE: ABNORMAL
EXT GLUCOSE, UA: NEGATIVE
EXT KETONES: ABNORMAL
EXT NITRITE, UA: NEGATIVE
EXT PH, UA: 6
EXT PREG TEST URINE: NEGATIVE
EXT PROTEIN, UA: ABNORMAL
EXT SPECIFIC GRAVITY, UA: 1.03
EXT UROBILINOGEN: 0.2
METHADONE UR QL: NEGATIVE
OPIATES UR QL SCN: NEGATIVE
PCP UR QL: NEGATIVE
THC UR QL: NEGATIVE
WBC # BLD EST: ABNORMAL 10*3/UL

## 2017-12-21 PROCEDURE — 80307 DRUG TEST PRSMV CHEM ANLYZR: CPT | Performed by: EMERGENCY MEDICINE

## 2017-12-21 PROCEDURE — 81025 URINE PREGNANCY TEST: CPT | Performed by: EMERGENCY MEDICINE

## 2017-12-21 PROCEDURE — 99285 EMERGENCY DEPT VISIT HI MDM: CPT

## 2017-12-21 PROCEDURE — 81002 URINALYSIS NONAUTO W/O SCOPE: CPT | Performed by: EMERGENCY MEDICINE

## 2017-12-21 RX ORDER — HALOPERIDOL 5 MG
5 TABLET ORAL EVERY 6 HOURS PRN
Status: CANCELLED | OUTPATIENT
Start: 2017-12-21

## 2017-12-21 RX ORDER — BENZTROPINE MESYLATE 1 MG/ML
1 INJECTION INTRAMUSCULAR; INTRAVENOUS EVERY 6 HOURS PRN
Status: CANCELLED | OUTPATIENT
Start: 2017-12-21

## 2017-12-21 RX ORDER — LORAZEPAM 1 MG/1
2 TABLET ORAL ONCE
Status: COMPLETED | OUTPATIENT
Start: 2017-12-21 | End: 2017-12-21

## 2017-12-21 RX ORDER — OLANZAPINE 10 MG/1
10 TABLET ORAL
Status: DISCONTINUED | OUTPATIENT
Start: 2017-12-21 | End: 2018-01-09 | Stop reason: HOSPADM

## 2017-12-21 RX ORDER — OLANZAPINE 10 MG/1
10 INJECTION, POWDER, LYOPHILIZED, FOR SOLUTION INTRAMUSCULAR
Status: CANCELLED | OUTPATIENT
Start: 2017-12-21

## 2017-12-21 RX ORDER — IBUPROFEN 600 MG/1
600 TABLET ORAL EVERY 8 HOURS PRN
Status: CANCELLED | OUTPATIENT
Start: 2017-12-21

## 2017-12-21 RX ORDER — OLANZAPINE 10 MG/1
10 INJECTION, POWDER, LYOPHILIZED, FOR SOLUTION INTRAMUSCULAR
Status: DISCONTINUED | OUTPATIENT
Start: 2017-12-21 | End: 2018-01-09 | Stop reason: HOSPADM

## 2017-12-21 RX ORDER — QUETIAPINE FUMARATE 25 MG/1
100 TABLET, FILM COATED ORAL
Status: CANCELLED | OUTPATIENT
Start: 2017-12-21

## 2017-12-21 RX ORDER — LORAZEPAM 1 MG/1
1 TABLET ORAL EVERY 6 HOURS PRN
Status: CANCELLED | OUTPATIENT
Start: 2017-12-21

## 2017-12-21 RX ORDER — LORAZEPAM 2 MG/ML
1 INJECTION INTRAMUSCULAR EVERY 4 HOURS PRN
Status: CANCELLED | OUTPATIENT
Start: 2017-12-21

## 2017-12-21 RX ORDER — ACETAMINOPHEN 325 MG/1
975 TABLET ORAL EVERY 6 HOURS PRN
Status: DISCONTINUED | OUTPATIENT
Start: 2017-12-21 | End: 2018-01-09 | Stop reason: HOSPADM

## 2017-12-21 RX ORDER — CARBAMAZEPINE 200 MG/1
200 TABLET ORAL 2 TIMES DAILY
Status: DISCONTINUED | OUTPATIENT
Start: 2017-12-21 | End: 2017-12-22

## 2017-12-21 RX ORDER — ACETAMINOPHEN 325 MG/1
975 TABLET ORAL EVERY 6 HOURS PRN
Status: CANCELLED | OUTPATIENT
Start: 2017-12-21

## 2017-12-21 RX ORDER — QUETIAPINE FUMARATE 100 MG/1
100 TABLET, FILM COATED ORAL
Status: DISCONTINUED | OUTPATIENT
Start: 2017-12-21 | End: 2017-12-22

## 2017-12-21 RX ORDER — HYDROXYZINE HYDROCHLORIDE 25 MG/1
25 TABLET, FILM COATED ORAL EVERY 6 HOURS PRN
Status: DISCONTINUED | OUTPATIENT
Start: 2017-12-21 | End: 2017-12-29

## 2017-12-21 RX ORDER — IBUPROFEN 600 MG/1
600 TABLET ORAL EVERY 8 HOURS PRN
Status: DISCONTINUED | OUTPATIENT
Start: 2017-12-21 | End: 2018-01-09 | Stop reason: HOSPADM

## 2017-12-21 RX ORDER — TRAZODONE HYDROCHLORIDE 50 MG/1
50 TABLET ORAL
Status: CANCELLED | OUTPATIENT
Start: 2017-12-21

## 2017-12-21 RX ORDER — LORAZEPAM 1 MG/1
1 TABLET ORAL EVERY 6 HOURS PRN
Status: DISCONTINUED | OUTPATIENT
Start: 2017-12-21 | End: 2018-01-09 | Stop reason: HOSPADM

## 2017-12-21 RX ORDER — HALOPERIDOL 5 MG
5 TABLET ORAL EVERY 6 HOURS PRN
Status: DISCONTINUED | OUTPATIENT
Start: 2017-12-21 | End: 2018-01-09 | Stop reason: HOSPADM

## 2017-12-21 RX ORDER — BENZTROPINE MESYLATE 1 MG/1
1 TABLET ORAL EVERY 6 HOURS PRN
Status: DISCONTINUED | OUTPATIENT
Start: 2017-12-21 | End: 2018-01-09 | Stop reason: HOSPADM

## 2017-12-21 RX ORDER — HALOPERIDOL 5 MG/ML
5 INJECTION INTRAMUSCULAR EVERY 6 HOURS PRN
Status: DISCONTINUED | OUTPATIENT
Start: 2017-12-21 | End: 2018-01-09 | Stop reason: HOSPADM

## 2017-12-21 RX ORDER — NICOTINE 21 MG/24HR
1 PATCH, TRANSDERMAL 24 HOURS TRANSDERMAL DAILY
Status: DISCONTINUED | OUTPATIENT
Start: 2017-12-22 | End: 2017-12-24

## 2017-12-21 RX ORDER — LORAZEPAM 2 MG/ML
1 INJECTION INTRAMUSCULAR EVERY 4 HOURS PRN
Status: DISCONTINUED | OUTPATIENT
Start: 2017-12-21 | End: 2018-01-09 | Stop reason: HOSPADM

## 2017-12-21 RX ORDER — BENZTROPINE MESYLATE 1 MG/ML
1 INJECTION INTRAMUSCULAR; INTRAVENOUS EVERY 6 HOURS PRN
Status: DISCONTINUED | OUTPATIENT
Start: 2017-12-21 | End: 2018-01-09 | Stop reason: HOSPADM

## 2017-12-21 RX ORDER — MAGNESIUM HYDROXIDE/ALUMINUM HYDROXICE/SIMETHICONE 120; 1200; 1200 MG/30ML; MG/30ML; MG/30ML
30 SUSPENSION ORAL EVERY 4 HOURS PRN
Status: DISCONTINUED | OUTPATIENT
Start: 2017-12-21 | End: 2018-01-09 | Stop reason: HOSPADM

## 2017-12-21 RX ORDER — HYDROXYZINE HYDROCHLORIDE 25 MG/1
25 TABLET, FILM COATED ORAL EVERY 6 HOURS PRN
Status: CANCELLED | OUTPATIENT
Start: 2017-12-21

## 2017-12-21 RX ORDER — OLANZAPINE 2.5 MG/1
10 TABLET ORAL
Status: CANCELLED | OUTPATIENT
Start: 2017-12-21

## 2017-12-21 RX ORDER — CARBAMAZEPINE 200 MG/1
200 TABLET ORAL 2 TIMES DAILY
Status: CANCELLED | OUTPATIENT
Start: 2017-12-21

## 2017-12-21 RX ORDER — TRAZODONE HYDROCHLORIDE 50 MG/1
50 TABLET ORAL
Status: DISCONTINUED | OUTPATIENT
Start: 2017-12-21 | End: 2018-01-09 | Stop reason: HOSPADM

## 2017-12-21 RX ORDER — NICOTINE 21 MG/24HR
1 PATCH, TRANSDERMAL 24 HOURS TRANSDERMAL DAILY
Status: CANCELLED | OUTPATIENT
Start: 2017-12-21

## 2017-12-21 RX ORDER — MAGNESIUM HYDROXIDE/ALUMINUM HYDROXICE/SIMETHICONE 120; 1200; 1200 MG/30ML; MG/30ML; MG/30ML
30 SUSPENSION ORAL EVERY 4 HOURS PRN
Status: CANCELLED | OUTPATIENT
Start: 2017-12-21

## 2017-12-21 RX ORDER — BENZTROPINE MESYLATE 1 MG/1
1 TABLET ORAL EVERY 6 HOURS PRN
Status: CANCELLED | OUTPATIENT
Start: 2017-12-21

## 2017-12-21 RX ORDER — ACETAMINOPHEN 325 MG/1
650 TABLET ORAL EVERY 6 HOURS PRN
Status: DISCONTINUED | OUTPATIENT
Start: 2017-12-21 | End: 2018-01-09 | Stop reason: HOSPADM

## 2017-12-21 RX ORDER — HALOPERIDOL 5 MG/ML
5 INJECTION INTRAMUSCULAR EVERY 6 HOURS PRN
Status: CANCELLED | OUTPATIENT
Start: 2017-12-21

## 2017-12-21 RX ORDER — OLANZAPINE 2.5 MG/1
10 TABLET ORAL
Status: DISCONTINUED | OUTPATIENT
Start: 2017-12-21 | End: 2017-12-21 | Stop reason: HOSPADM

## 2017-12-21 RX ORDER — ACETAMINOPHEN 325 MG/1
650 TABLET ORAL EVERY 6 HOURS PRN
Status: CANCELLED | OUTPATIENT
Start: 2017-12-21

## 2017-12-21 RX ADMIN — OLANZAPINE 10 MG: 2.5 TABLET, FILM COATED ORAL at 10:51

## 2017-12-21 RX ADMIN — LORAZEPAM 2 MG: 1 TABLET ORAL at 10:51

## 2017-12-21 NOTE — ED NOTES
Pt appears to be sleeping, no signs of distress  Will continue visual and audio monitoring       [facility]  12/21/17 5067

## 2017-12-21 NOTE — LETTER
[address] [facility] [address]  [address] [place] [id number]  Dept: [phone]                                                                 EMTALA TRANSFER CONSENT    NAME Yevgeniy Melgar                            1980                              MRN [id number]    I have been informed of my rights regarding examination, treatment, and transfer   by Dr Gillian Meade*    Benefits: Other benefits (Include comment)_______________________ (Inpatient Psychiatric Treatment)    Risks: Potential deterioration of medical condition    Consent for Transfer:  I acknowledge that my medical condition has been evaluated and explained to me by the emergency department physician or other qualified medical person and/or my attending physician, who has recommended that I be transferred to the service of  Accepting Physician: Dr Richard Blackwell at [address] [place] [address] Name, [address] : [address] [facility]  The above potential benefits of such transfer, the potential risks associated with such transfer, and the probable risks of not being transferred have been explained to me, and I fully understand them  The doctor has explained that, in my case, the benefits of transfer outweigh the risks  I agree to be transferred  I authorize the performance of emergency medical procedures and treatments upon me in both transit and upon arrival at the receiving facility  Additionally, I authorize the release of any and all medical records to the receiving facility and request they be transported with me, if possible  I understand that the safest mode of transportation during a medical emergency is an ambulance and that the Hospital advocates the use of this mode of transport  Risks of traveling to the receiving facility by car, including absence of medical control, life sustaining equipment, such as oxygen, and medical personnel has been explained to me and I fully understand them      (ANNE CORRECT BOX BELOW)  [ X ]  I consent to the stated transfer and to be transported by ambulance/helicopter  [  ]  I consent to the stated transfer, but refuse transportation by ambulance and accept full responsibility for my transportation by car  I understand the risks of non-ambulance transfers and I exonerate the Hospital and its staff from any deterioration in my condition that results from this refusal     X___________________________________________    DATE  17  TIME________  Signature of patient or legally responsible individual signing on patient behalf           RELATIONSHIP TO PATIENT_________________________                      Provider Certification    NAME Taz DAVIS 1980                              MRN [id number]    A medical screening exam was performed on the above named patient  Based on the examination:    Condition Necessitating Transfer Homicidal Ideation  Delusions  Patient Condition: The patient has been stabilized such that within reasonable medical probability, no material deterioration of the patient condition or the condition of the unborn child(hayden) is likely to result from the transfer    Reason for Transfer: Level of Care needed not available at this facility    Transfer Requirements: [facility]   · Space available and qualified personnel available for treatment as acknowledged by GINNY Marie  · Agreed to accept transfer and to provide appropriate medical treatment as acknowledged by       Dr Kingston Mahoney  · Appropriate medical records of the examination and treatment of the patient are provided at the time of transfer   [address], [address] __JG_____  · Transfer will be performed by qualified personnel from [facility]  and appropriate transfer equipment as required, including the use of necessary and appropriate life support measures      Provider Certification: I have examined the patient and explained the following risks and benefits of being transferred/refusing transfer to the patient/family:  The patient is stable for psychiatric transfer because they are medically stable, and is protected from harming him/herself or others during transport      Based on these reasonable risks and benefits to the patient and/or the unborn child(hayden), and based upon the information available at the time of the patients examination, I certify that the medical benefits reasonably to be expected from the provision of appropriate medical treatments at another medical facility outweigh the increasing risks, if any, to the individuals medical condition, and in the case of labor to the unborn child, from effecting the transfer      X____________________________________________ DATE 12/21/17        TIME_______      ORIGINAL - SEND TO MEDICAL RECORDS   COPY - SEND WITH PATIENT DURING TRANSFER

## 2017-12-21 NOTE — ED NOTES
302 faxed to [facility] for review in network      GINNY Huggins  12/21/2017  1121

## 2017-12-21 NOTE — EMTALA/ACUTE CARE TRANSFER
[address] [address]  Dept: [phone]      EMTALA TRANSFER CONSENT    NAME Wilfred Choudhury                                         1980                              MRN [id number]    I have been informed of my rights regarding examination, treatment, and transfer   by Dr Stephany Donnelly*    Benefits: Other benefits (Include comment)_______________________ (Inpatient Psychiatric Treatment)    Risks: Potential deterioration of medical condition      Consent for Transfer:  I acknowledge that my medical condition has been evaluated and explained to me by the emergency department physician or other qualified medical person and/or my attending physician, who has recommended that I be transferred to the service of  Accepting Physician: Dr Fanny Saenz at [address] [place] [address] Name, [address] : [address] [facility]  The above potential benefits of such transfer, the potential risks associated with such transfer, and the probable risks of not being transferred have been explained to me, and I fully understand them  The doctor has explained that, in my case, the benefits of transfer outweigh the risks  I agree to be transferred  I authorize the performance of emergency medical procedures and treatments upon me in both transit and upon arrival at the receiving facility  Additionally, I authorize the release of any and all medical records to the receiving facility and request they be transported with me, if possible  I understand that the safest mode of transportation during a medical emergency is an ambulance and that the Hospital advocates the use of this mode of transport  Risks of traveling to the receiving facility by car, including absence of medical control, life sustaining equipment, such as oxygen, and medical personnel has been explained to me and I fully understand them      (ANNE CORRECT BOX BELOW)  [  ]  I consent to the stated transfer and to be transported by ambulance/helicopter  [  ]  I consent to the stated transfer, but refuse transportation by ambulance and accept full responsibility for my transportation by car  I understand the risks of non-ambulance transfers and I exonerate the Hospital and its staff from any deterioration in my condition that results from this refusal     X___________________________________________    DATE  17  TIME________  Signature of patient or legally responsible individual signing on patient behalf           RELATIONSHIP TO PATIENT_________________________          Provider Certification    NAME Clint Chowdhury                                         1980                              MRN [id number]    A medical screening exam was performed on the above named patient  Based on the examination:    Condition Necessitating Transfer The encounter diagnosis was Psychosis  Patient Condition: The patient has been stabilized such that within reasonable medical probability, no material deterioration of the patient condition or the condition of the unborn child(hayden) is likely to result from the transfer    Reason for Transfer: Level of Care needed not available at this facility    Transfer Requirements: [facility]   · Space available and qualified personnel available for treatment as acknowledged by GINNY Nur  · Agreed to accept transfer and to provide appropriate medical treatment as acknowledged by       Dr Daniel Prescott  · Appropriate medical records of the examination and treatment of the patient are provided at the time of transfer   [address] [facility] _______  · Transfer will be performed by qualified personnel from [facility]  and appropriate transfer equipment as required, including the use of necessary and appropriate life support measures      Provider Certification: I have examined the patient and explained the following risks and benefits of being transferred/refusing transfer to the patient/family:  The patient is stable for psychiatric transfer because they are medically stable, and is protected from harming him/herself or others during transport      Based on these reasonable risks and benefits to the patient and/or the unborn child(hayden), and based upon the information available at the time of the patients examination, I certify that the medical benefits reasonably to be expected from the provision of appropriate medical treatments at another medical facility outweigh the increasing risks, if any, to the individuals medical condition, and in the case of labor to the unborn child, from effecting the transfer      X____________________________________________ DATE 12/21/17        TIME_______      ORIGINAL - SEND TO MEDICAL RECORDS   COPY - SEND WITH PATIENT DURING TRANSFER

## 2017-12-21 NOTE — ED NOTES
Pt appears to be sleeping while sitting at the edge of the bed  Will continue audio and visual monitoring        [facility]  12/21/17 9669

## 2017-12-21 NOTE — ED NOTES
Call placed to [facility] at [phone] and spoke with  Noe Peace  who states this this is a pharmacy supplement and there is no medical coverage under this plan      Alayna Johnston, GINNY  12/21/2017  0587

## 2017-12-21 NOTE — ED NOTES
Spoke with [facility] who stated that they would not require the pt's mother to attend a hearing, but would request that she be contacted prior to a 303 hearing taking place so that she can partake it in (by phone) if interested  Lorie Kelsey stated that if the pt's mother is unreachable at that time, that they would have to defer to how the pt is presenting at the time of the hearing  Discussed with Raven Wolfe in Crisis who spoke with Dr Citlali Mujica is in agreement with the pt coming to [address]: [phone] (when called, it states that the # is unreachable)  A call was also placed to [phone], but did not go through      GINNY Porras  12/21/2017  6832

## 2017-12-21 NOTE — ED NOTES
Pt refuses to take off earrings and necklace  RN aware  Pt is lying in bed staring at wall   Will continue audio and visual monitoring     Pearletha Claude  12/21/17 0617
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
## 2017-12-21 NOTE — ED NOTES
Pt appears to be sleeping, no signs of distress  Will continue visual and audio monitoring       [facility]  12/21/17 0434

## 2017-12-21 NOTE — ED NOTES
Pt appears to be sleeping, no signs of distress  Will continue visual and audio monitoring       [facility]  12/21/17 2536

## 2017-12-21 NOTE — ED NOTES
Pt appears to be sleeping, no signs of distress  Will continue visual and audio monitoring       [facility]  12/21/17 4287

## 2017-12-21 NOTE — ED NOTES
Pt is currently in the bathroom  Pt does not want to take off earrings and necklace  Pt is irritated        [facility]  12/21/17 6539

## 2017-12-21 NOTE — ED NOTES
Transport has been arranged with Tj Gonsalves at [facility] for p/u at 7:30pm      RN TO CALL REPORT TO LATESHA ROSE [phone] PRIOR TO TRANSFER       GINNY Huggins  12/21/2017  1400

## 2017-12-21 NOTE — ED NOTES
302, ACT 77, CRF and Rights emailed to [facility] at [facility] GINNY Garrison  12/21/2017  1424

## 2017-12-21 NOTE — ED NOTES
Pt appears to be sleeping, no signs of distress  Will continue visual and audio monitoring       [facility]en [facility]  12/21/17 1737

## 2017-12-21 NOTE — ED NOTES
Pt refused to take off earrings and necklace at this time   Jeison and RN aware of situation  Pt stated, "I'll take them off once I get my meds and eat"  Pt is sitting at the edge of the bed crying  Will continue audio and visual monitoring        [facility]  12/21/17 2417

## 2017-12-21 NOTE — ED NOTES
Report given to [employer] Tati Cardona, [facility]        Freida Phillips RN  12/21/17 5907

## 2017-12-21 NOTE — ED NOTES
Pt appears to be sleeping, no signs of distress  Will continue visual and audio monitoring       Melita Tripathi [facility]  12/21/17 7247

## 2017-12-22 LAB
ALBUMIN SERPL BCP-MCNC: 3.7 G/DL (ref 3.5–5)
ALP SERPL-CCNC: 71 U/L (ref 46–116)
ALT SERPL W P-5'-P-CCNC: 32 U/L (ref 12–78)
ANION GAP SERPL CALCULATED.3IONS-SCNC: 8 MMOL/L (ref 4–13)
AST SERPL W P-5'-P-CCNC: 8 U/L (ref 5–45)
BASOPHILS # BLD AUTO: 0.02 THOUSANDS/ΜL (ref 0–0.1)
BASOPHILS NFR BLD AUTO: 0 % (ref 0–1)
BILIRUB SERPL-MCNC: 0.32 MG/DL (ref 0.2–1)
BUN SERPL-MCNC: 14 MG/DL (ref 5–25)
CALCIUM SERPL-MCNC: 8.9 MG/DL (ref 8.3–10.1)
CARBAMAZEPINE SERPL-MCNC: <0.5 UG/ML (ref 4–12)
CHLORIDE SERPL-SCNC: 108 MMOL/L (ref 100–108)
CHOLEST SERPL-MCNC: 213 MG/DL (ref 50–200)
CO2 SERPL-SCNC: 25 MMOL/L (ref 21–32)
CREAT SERPL-MCNC: 0.81 MG/DL (ref 0.6–1.3)
EOSINOPHIL # BLD AUTO: 0 THOUSAND/ΜL (ref 0–0.61)
EOSINOPHIL NFR BLD AUTO: 0 % (ref 0–6)
ERYTHROCYTE [DISTWIDTH] IN BLOOD BY AUTOMATED COUNT: 14.5 % (ref 11.6–15.1)
GFR SERPL CREATININE-BSD FRML MDRD: 94 ML/MIN/1.73SQ M
GLUCOSE P FAST SERPL-MCNC: 99 MG/DL (ref 65–99)
GLUCOSE SERPL-MCNC: 99 MG/DL (ref 65–140)
HCG SERPL QL: NEGATIVE
HCT VFR BLD AUTO: 43.6 % (ref 34.8–46.1)
HDLC SERPL-MCNC: 65 MG/DL (ref 40–60)
HGB BLD-MCNC: 14.5 G/DL (ref 11.5–15.4)
LDLC SERPL CALC-MCNC: 131 MG/DL (ref 0–100)
LYMPHOCYTES # BLD AUTO: 2.76 THOUSANDS/ΜL (ref 0.6–4.47)
LYMPHOCYTES NFR BLD AUTO: 34 % (ref 14–44)
MCH RBC QN AUTO: 29.7 PG (ref 26.8–34.3)
MCHC RBC AUTO-ENTMCNC: 33.3 G/DL (ref 31.4–37.4)
MCV RBC AUTO: 89 FL (ref 82–98)
MONOCYTES # BLD AUTO: 0.83 THOUSAND/ΜL (ref 0.17–1.22)
MONOCYTES NFR BLD AUTO: 10 % (ref 4–12)
NEUTROPHILS # BLD AUTO: 4.45 THOUSANDS/ΜL (ref 1.85–7.62)
NEUTS SEG NFR BLD AUTO: 56 % (ref 43–75)
NRBC BLD AUTO-RTO: 0 /100 WBCS
PLATELET # BLD AUTO: 367 THOUSANDS/UL (ref 149–390)
PMV BLD AUTO: 10.8 FL (ref 8.9–12.7)
POTASSIUM SERPL-SCNC: 3.7 MMOL/L (ref 3.5–5.3)
PROT SERPL-MCNC: 7.6 G/DL (ref 6.4–8.2)
RBC # BLD AUTO: 4.89 MILLION/UL (ref 3.81–5.12)
RPR SER QL: NORMAL
SODIUM SERPL-SCNC: 141 MMOL/L (ref 136–145)
TRIGL SERPL-MCNC: 84 MG/DL
TSH SERPL DL<=0.05 MIU/L-ACNC: 0.88 UIU/ML (ref 0.36–3.74)
WBC # BLD AUTO: 8.08 THOUSAND/UL (ref 4.31–10.16)

## 2017-12-22 PROCEDURE — 80156 ASSAY CARBAMAZEPINE TOTAL: CPT | Performed by: PSYCHIATRY & NEUROLOGY

## 2017-12-22 PROCEDURE — 80053 COMPREHEN METABOLIC PANEL: CPT | Performed by: PSYCHIATRY & NEUROLOGY

## 2017-12-22 PROCEDURE — 85025 COMPLETE CBC W/AUTO DIFF WBC: CPT | Performed by: PSYCHIATRY & NEUROLOGY

## 2017-12-22 PROCEDURE — 84443 ASSAY THYROID STIM HORMONE: CPT | Performed by: PSYCHIATRY & NEUROLOGY

## 2017-12-22 PROCEDURE — 84703 CHORIONIC GONADOTROPIN ASSAY: CPT | Performed by: PSYCHIATRY & NEUROLOGY

## 2017-12-22 PROCEDURE — 80061 LIPID PANEL: CPT | Performed by: PSYCHIATRY & NEUROLOGY

## 2017-12-22 PROCEDURE — 86592 SYPHILIS TEST NON-TREP QUAL: CPT | Performed by: PSYCHIATRY & NEUROLOGY

## 2017-12-22 RX ORDER — AMMONIUM LACTATE 12 G/100G
CREAM TOPICAL 2 TIMES DAILY PRN
Status: DISCONTINUED | OUTPATIENT
Start: 2017-12-22 | End: 2018-01-01

## 2017-12-22 RX ORDER — QUETIAPINE FUMARATE 200 MG/1
200 TABLET, FILM COATED ORAL EVERY MORNING
Status: DISCONTINUED | OUTPATIENT
Start: 2017-12-23 | End: 2018-01-09 | Stop reason: HOSPADM

## 2017-12-22 RX ORDER — HYDROXYZINE 50 MG/1
50 TABLET, FILM COATED ORAL 2 TIMES DAILY PRN
Status: DISCONTINUED | OUTPATIENT
Start: 2017-12-22 | End: 2017-12-29

## 2017-12-22 RX ORDER — CARBAMAZEPINE 200 MG/1
400 TABLET ORAL
Status: DISCONTINUED | OUTPATIENT
Start: 2017-12-22 | End: 2018-01-09 | Stop reason: HOSPADM

## 2017-12-22 RX ORDER — OLANZAPINE 5 MG/1
5 TABLET, ORALLY DISINTEGRATING ORAL 2 TIMES DAILY
Status: DISCONTINUED | OUTPATIENT
Start: 2017-12-22 | End: 2017-12-25

## 2017-12-22 RX ORDER — CARBAMAZEPINE 200 MG/1
200 TABLET ORAL DAILY
Status: DISCONTINUED | OUTPATIENT
Start: 2017-12-23 | End: 2018-01-09 | Stop reason: HOSPADM

## 2017-12-22 RX ORDER — BETAMETHASONE DIPROPIONATE 0.05 %
OINTMENT (GRAM) TOPICAL 2 TIMES DAILY
Status: DISCONTINUED | OUTPATIENT
Start: 2017-12-22 | End: 2017-12-24

## 2017-12-22 RX ORDER — QUETIAPINE FUMARATE 300 MG/1
600 TABLET, FILM COATED ORAL
Status: DISCONTINUED | OUTPATIENT
Start: 2017-12-22 | End: 2017-12-25

## 2017-12-22 RX ORDER — DOCUSATE SODIUM 100 MG/1
100 CAPSULE, LIQUID FILLED ORAL 2 TIMES DAILY
Status: DISCONTINUED | OUTPATIENT
Start: 2017-12-22 | End: 2017-12-22

## 2017-12-22 RX ADMIN — CARBAMAZEPINE 200 MG: 200 TABLET ORAL at 09:22

## 2017-12-22 RX ADMIN — CARBAMAZEPINE 400 MG: 200 TABLET ORAL at 17:35

## 2017-12-22 RX ADMIN — BETAMETHASONE DIPROPIONATE: 0.5 OINTMENT TOPICAL at 17:43

## 2017-12-22 RX ADMIN — OLANZAPINE 5 MG: 5 TABLET, ORALLY DISINTEGRATING ORAL at 17:35

## 2017-12-22 RX ADMIN — NICOTINE 1 PATCH: 21 PATCH, EXTENDED RELEASE TRANSDERMAL at 09:31

## 2017-12-22 RX ADMIN — QUETIAPINE FUMARATE 600 MG: 300 TABLET ORAL at 21:36
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
## 2017-12-22 NOTE — PROGRESS NOTES
This morning patient was found sitting in a room alone listening to music and staring out the window  When asked how she was feeling she became tearful and said "anxious" and "depressed" but indicated she did not want to talk about why  Cooperative with medications but reiterated her desire not to take gabapentin  Patient became brighter as they day went on and began interacting socially--albeit somewhat intrusively--with other patients
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
## 2017-12-22 NOTE — PROGRESS NOTES
Pt irritable and liable and refusing to speak to RN    Pt does not feel she should be in the hospital

## 2017-12-23 RX ORDER — NICOTINE 21 MG/24HR
21 PATCH, TRANSDERMAL 24 HOURS TRANSDERMAL DAILY
Status: DISCONTINUED | OUTPATIENT
Start: 2017-12-23 | End: 2018-01-09 | Stop reason: HOSPADM

## 2017-12-23 RX ADMIN — LORAZEPAM 1 MG: 2 INJECTION INTRAMUSCULAR; INTRAVENOUS at 16:05

## 2017-12-23 RX ADMIN — HALOPERIDOL LACTATE 5 MG: 5 INJECTION, SOLUTION INTRAMUSCULAR at 16:05

## 2017-12-23 RX ADMIN — OLANZAPINE 5 MG: 5 TABLET, ORALLY DISINTEGRATING ORAL at 08:38

## 2017-12-23 RX ADMIN — QUETIAPINE FUMARATE 200 MG: 200 TABLET ORAL at 08:38

## 2017-12-23 RX ADMIN — NICOTINE 21 MG: 21 PATCH, EXTENDED RELEASE TRANSDERMAL at 15:27

## 2017-12-23 RX ADMIN — CARBAMAZEPINE 200 MG: 200 TABLET ORAL at 08:38
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
## 2017-12-23 NOTE — TREATMENT PLAN
TREATMENT PLAN REVIEW - [address] 40 y o  1980 female MRN: [id number]    [address] [facility] Room / Bed: Colton Schirmer 717-23 Encounter: [id number]          Admit Date/Time:  12/21/2017  8:50 PM    Treatment Team: Attending Provider: Vikki Trujillo MD; : Hugo Sylvester; Patient Care Technician: Alcides Bailon; Registered Nurse: Michelet Machuca RN; Patient Care Technician: Madai Krishnan;  Patient Care Technician: Jamshid Mauricio    Diagnosis: Principal Problem:    Bipolar I disorder, most recent episode mixed, severe with psychotic features ([facility] [address] )  vs  Schizophrenia    Patient Strengths: negotiates basic needs, supportive family     Patient Barriers: lack of financial means, lack of social/family support, patient is on an involuntary commitment, patient is unwilling to work on problems, poor insight, poor past treatment response, poor support system, resistance to treatment, unable to express basic needs    Short Term Goals: decrease in paranoid thoughts, decrease in psychotic symptoms    Long Term Goals: resolution of psychotic symptoms, improved reality testing, improved reasoning ability, improved impulse control, improved insight    Progress Towards Goals: starting psychitric medications as prescribed    Recommended Treatment: medication management, patient medication education, group therapy, milieu therapy, continued Behavioral Health psychiatric evaluation/assessment process     Treatment Frequency: daily medication monitoring, group and milieu therapy daily, monitoring through interdisciplinary rounds, monitoring through weekly patient care conferences    Expected Discharge Date:  10-12 days    Discharge Plan: referral to [address] [place] [address] Team for close psychiatric monitoring, return to previous living arrangement    Treatment Plan Created/Updated By: Vikki Trujillo MD

## 2017-12-23 NOTE — PROGRESS NOTES
Pt denies SI or HI; denies any hallucinations or psychosis  Pt appears irritable and labile  Pt states she shouldn't be here and that "everything is Justin's fault"  Pt took medications; when asked if she knew what she was taking, pt responded "I don't care what I'm taking  Just give it to me " Pt is selectively present in the milieu

## 2017-12-23 NOTE — PROGRESS NOTES
Progress Note - Behavioral Health   Renee Guerrero 40 y o  female MRN: [id number]  Unit/Bed#: SS5 733-59 Encounter: [id number]    Assessment/Plan   Principal Problem:    Bipolar I disorder, most recent episode mixed, severe with psychotic features ([address] )      Behavior over the last 24 hours:  unchanged  Sleep: normal  Appetite: normal  Medication side effects: No  ROS: no complaints    Mental Status Evaluation:  Appearance:  disheveled   Behavior:  guarded and psychomotor agitation   Speech:  loud and pressured   Mood:  irritable and labile   Affect:  labile   Thought Process:  disorganized, flight of ideas and illogical   Thought Content:  delusions  grandiose and persecutory   Perceptual Disturbances: denies   Risk Potential: Potential for Aggression Yes labile   Sensorium:  person and place   Cognition:  grossly intact   Consciousness:  awake    Attention: attention span and concentration were age appropriate   Insight:  limited   Judgment: limited   Gait/Station: normal gait/station   Motor Activity: no abnormal movements     Progress Toward Goals: Pt remains disorganized,paranoid and disinhibited with limited insight into her medical illness  Pt seen pacing the hallway ,too disruptive to engage in unit milue,she admits to lots of agitation and anxiety related symptoms  Not fully engaged with myself but  is medication compliant     Recommended Treatment:  1-Cont current medication  2-Continue with group therapy, milieu therapy and occupational therapy  Risks, benefits and possible side effects of Medications:   Risks, benefits, and possible side effects of medications explained to patient and patient verbalizes understanding        Medications:   current meds:   Current Facility-Administered Medications   Medication Dose Route Frequency    acetaminophen (TYLENOL) tablet 650 mg  650 mg Oral Q6H PRN    acetaminophen (TYLENOL) tablet 975 mg  975 mg Oral Q6H PRN    aluminum-magnesium hydroxide-simethicone (MYLANTA) 200-200-20 mg/5 mL oral suspension 30 mL  30 mL Oral Q4H PRN    ammonium lactate (LAC-HYDRIN) 12 % cream   Topical BID PRN    benztropine (COGENTIN) injection 1 mg  1 mg Intramuscular Q6H PRN    benztropine (COGENTIN) tablet 1 mg  1 mg Oral Q6H PRN    betamethasone dipropionate (DIPROSONE) 0 05 % ointment   Topical BID    carBAMazepine (TEGretol) tablet 200 mg  200 mg Oral Daily    carBAMazepine (TEGretol) tablet 400 mg  400 mg Oral After Dinner    haloperidol (HALDOL) tablet 5 mg  5 mg Oral Q6H PRN    haloperidol lactate (HALDOL) injection 5 mg  5 mg Intramuscular Q6H PRN    hydrOXYzine HCL (ATARAX) tablet 25 mg  25 mg Oral Q6H PRN    hydrOXYzine HCL (ATARAX) tablet 50 mg  50 mg Oral BID PRN    ibuprofen (MOTRIN) tablet 600 mg  600 mg Oral Q8H PRN    LORazepam (ATIVAN) 2 mg/mL injection 1 mg  1 mg Intramuscular Q4H PRN    LORazepam (ATIVAN) tablet 1 mg  1 mg Oral Q6H PRN    magnesium hydroxide (MILK OF MAGNESIA) 400 mg/5 mL oral suspension 30 mL  30 mL Oral Daily PRN    nicotine (NICODERM CQ) 21 mg/24 hr TD 24 hr patch 1 patch  1 patch Transdermal Daily    nicotine (NICODERM CQ) 21 mg/24 hr TD 24 hr patch 21 mg  21 mg Transdermal Daily    OLANZapine (ZyPREXA ZYDIS) dispersible tablet 5 mg  5 mg Oral BID    OLANZapine (ZyPREXA) IM injection 10 mg  10 mg Intramuscular Q3H PRN    OLANZapine (ZyPREXA) tablet 10 mg  10 mg Oral Q3H PRN    QUEtiapine (SEROquel) tablet 200 mg  200 mg Oral QAM    QUEtiapine (SEROquel) tablet 600 mg  600 mg Oral HS    traZODone (DESYREL) tablet 50 mg  50 mg Oral HS PRN     Labs: I have personally reviewed pt's labs    Counseling / Coordination of Care  Total floor / unit time spent today 25 minutes  Greater than 50% of total time was spent with the patient and / or family counseling and / or coordination of care   A description of the counseling / coordination of care:

## 2017-12-23 NOTE — PLAN OF CARE
Problem: PSYCHOSIS  Goal: Will report no hallucinations or delusions  Interventions:  - Administer medication as  ordered  - Every waking shifts and PRN assess for the presence of hallucinations and or delusions  - Assist with reality testing to support increasing orientation  - Assess if patient's hallucinations or delusions are encouraging self-harm or harm to others and intervene as appropriate   Outcome: Progressing      Problem: INVOLUNTARY ADMIT  Goal: Will cooperate with staff recommendations and doctor's orders and will demonstrate appropriate behavior  INTERVENTIONS:  - Treat underlying conditions and offer medication as ordered  - Educate regarding involuntary admission procedures and rules  - Utilize positive consistent limit setting strategies to support patient and staff safety   Outcome: Progressing      Problem: DISCHARGE PLANNING  Goal: Discharge to home or other facility with appropriate resources  INTERVENTIONS:  - Identify barriers to discharge w/patient and caregiver  - Arrange for needed discharge resources and transportation as appropriate  - Identify discharge learning needs (meds, wound care, etc )  - Refer to Case Management Department for coordinating discharge planning if the patient needs post-hospital services based on physician/advanced practitioner order or complex needs related to functional status, cognitive ability, or social support system    Outcome: Progressing      Problem: Ineffective Coping  Goal: Participates in unit activities  Interventions:  - Provide therapeutic environment   - Provide required programming   - Redirect inappropriate behaviors    Outcome: Progressing

## 2017-12-24 RX ADMIN — CARBAMAZEPINE 200 MG: 200 TABLET ORAL at 08:44

## 2017-12-24 RX ADMIN — OLANZAPINE 5 MG: 5 TABLET, ORALLY DISINTEGRATING ORAL at 08:44

## 2017-12-24 RX ADMIN — NICOTINE POLACRILEX 2 MG: 2 GUM, CHEWING ORAL at 11:16

## 2017-12-24 RX ADMIN — NICOTINE 21 MG: 21 PATCH, EXTENDED RELEASE TRANSDERMAL at 11:16

## 2017-12-24 RX ADMIN — QUETIAPINE FUMARATE 600 MG: 300 TABLET ORAL at 21:05

## 2017-12-24 RX ADMIN — QUETIAPINE FUMARATE 200 MG: 200 TABLET ORAL at 08:44

## 2017-12-24 RX ADMIN — OLANZAPINE 5 MG: 5 TABLET, ORALLY DISINTEGRATING ORAL at 17:18

## 2017-12-24 RX ADMIN — CARBAMAZEPINE 400 MG: 200 TABLET ORAL at 17:18

## 2017-12-24 RX ADMIN — NICOTINE POLACRILEX 2 MG: 2 GUM, CHEWING ORAL at 19:43

## 2017-12-24 NOTE — PROGRESS NOTES
Pt denies all s/s  She remains irritable/labile  Pt is disorganized and is now reporting she "received insulin in the middle of the night because I ate too much sugar yesterday", which is untrue  Pt is cooperative with medications

## 2017-12-24 NOTE — PROGRESS NOTES
Patient approached nursing station during shift and ask me to apologize to staff on her behalf  Patient states she shouldn't be here and she's upset with her son's father and took her frustration out on staff

## 2017-12-24 NOTE — PROGRESS NOTES
Progress Note - Behavioral Health   Jana Binder 40 y o  female MRN: [id number]  Unit/Bed#: HZ8 597-62 Encounter: [id number]    Assessment/Plan   Principal Problem:    Bipolar I disorder, most recent episode mixed, severe with psychotic features ([address] )      Behavior over the last 24 hours:  unchanged  Sleep: normal  Appetite: normal  Medication side effects: No  ROS: no complaints    Mental Status Evaluation:  Appearance:  disheveled   Behavior:  guarded and psychomotor agitation   Speech:  loud and pressured   Mood:  irritable and labile   Affect:  labile   Thought Process:  disorganized, flight of ideas and illogical   Thought Content:  delusions  grandiose and persecutory   Perceptual Disturbances: denies   Risk Potential: Potential for Aggression Yes labile   Sensorium:  person and place   Cognition:  grossly intact   Consciousness:  awake    Attention: attention span and concentration were age appropriate   Insight:  limited   Judgment: limited   Gait/Station: normal gait/station   Motor Activity: no abnormal movements     Progress Toward Goals: Pt is quite labile ,paranoid and cont to be disorganized and disinhibited with limited insight into her medical illness  Staff report some threatening behavior overnight   Pt is still too disruptive to engage in unit milue but  is medication compliant     Recommended Treatment:  1-Cont current medication  2-Continue with group therapy, milieu therapy and occupational therapy  Risks, benefits and possible side effects of Medications:   Risks, benefits, and possible side effects of medications explained to patient and patient verbalizes understanding        Medications:   current meds:   Current Facility-Administered Medications   Medication Dose Route Frequency    acetaminophen (TYLENOL) tablet 650 mg  650 mg Oral Q6H PRN    acetaminophen (TYLENOL) tablet 975 mg  975 mg Oral Q6H PRN    aluminum-magnesium hydroxide-simethicone (MYLANTA) 200-200-20 mg/5 mL oral suspension 30 mL  30 mL Oral Q4H PRN    ammonium lactate (LAC-HYDRIN) 12 % cream   Topical BID PRN    benztropine (COGENTIN) injection 1 mg  1 mg Intramuscular Q6H PRN    benztropine (COGENTIN) tablet 1 mg  1 mg Oral Q6H PRN    betamethasone dipropionate (DIPROSONE) 0 05 % ointment   Topical BID    carBAMazepine (TEGretol) tablet 200 mg  200 mg Oral Daily    carBAMazepine (TEGretol) tablet 400 mg  400 mg Oral After Dinner    haloperidol (HALDOL) tablet 5 mg  5 mg Oral Q6H PRN    haloperidol lactate (HALDOL) injection 5 mg  5 mg Intramuscular Q6H PRN    hydrOXYzine HCL (ATARAX) tablet 25 mg  25 mg Oral Q6H PRN    hydrOXYzine HCL (ATARAX) tablet 50 mg  50 mg Oral BID PRN    ibuprofen (MOTRIN) tablet 600 mg  600 mg Oral Q8H PRN    LORazepam (ATIVAN) 2 mg/mL injection 1 mg  1 mg Intramuscular Q4H PRN    LORazepam (ATIVAN) tablet 1 mg  1 mg Oral Q6H PRN    magnesium hydroxide (MILK OF MAGNESIA) 400 mg/5 mL oral suspension 30 mL  30 mL Oral Daily PRN    nicotine (NICODERM CQ) 21 mg/24 hr TD 24 hr patch 21 mg  21 mg Transdermal Daily    nicotine polacrilex (NICORETTE) gum 2 mg  2 mg Oral Q2H PRN    OLANZapine (ZyPREXA ZYDIS) dispersible tablet 5 mg  5 mg Oral BID    OLANZapine (ZyPREXA) IM injection 10 mg  10 mg Intramuscular Q3H PRN    OLANZapine (ZyPREXA) tablet 10 mg  10 mg Oral Q3H PRN    QUEtiapine (SEROquel) tablet 200 mg  200 mg Oral QAM    QUEtiapine (SEROquel) tablet 600 mg  600 mg Oral HS    traZODone (DESYREL) tablet 50 mg  50 mg Oral HS PRN     Labs: I have personally reviewed pt's labs    Counseling / Coordination of Care  Total floor / unit time spent today 25 minutes  Greater than 50% of total time was spent with the patient and / or family counseling and / or coordination of care   A description of the counseling / coordination of care:

## 2017-12-24 NOTE — PLAN OF CARE
Problem: PSYCHOSIS  Goal: Will report no hallucinations or delusions  Interventions:  - Administer medication as  ordered  - Every waking shifts and PRN assess for the presence of hallucinations and or delusions  - Assist with reality testing to support increasing orientation  - Assess if patient's hallucinations or delusions are encouraging self-harm or harm to others and intervene as appropriate   Outcome: Not Progressing      Problem: INVOLUNTARY ADMIT  Goal: Will cooperate with staff recommendations and doctor's orders and will demonstrate appropriate behavior  INTERVENTIONS:  - Treat underlying conditions and offer medication as ordered  - Educate regarding involuntary admission procedures and rules  - Utilize positive consistent limit setting strategies to support patient and staff safety   Outcome: Progressing      Problem: DISCHARGE PLANNING  Goal: Discharge to home or other facility with appropriate resources  INTERVENTIONS:  - Identify barriers to discharge w/patient and caregiver  - Arrange for needed discharge resources and transportation as appropriate  - Identify discharge learning needs (meds, wound care, etc )  - Refer to Case Management Department for coordinating discharge planning if the patient needs post-hospital services based on physician/advanced practitioner order or complex needs related to functional status, cognitive ability, or social support system    Outcome: Progressing      Problem: Ineffective Coping  Goal: Participates in unit activities  Interventions:  - Provide therapeutic environment   - Provide required programming   - Redirect inappropriate behaviors    Outcome: Progressing

## 2017-12-24 NOTE — PROGRESS NOTES
Pt remains delusional about her mother not being her real mother  Pt remains irritable, liable and loud in the milieu  Pt also preoccupied with " gays and homosexual on this unit all going to hell "  Pt does not believe she needs to be in a psychiatric unit

## 2017-12-25 RX ORDER — QUETIAPINE FUMARATE 300 MG/1
300 TABLET, FILM COATED ORAL
Status: DISCONTINUED | OUTPATIENT
Start: 2017-12-25 | End: 2017-12-26

## 2017-12-25 RX ORDER — OLANZAPINE 5 MG/1
5 TABLET, ORALLY DISINTEGRATING ORAL DAILY
Status: DISCONTINUED | OUTPATIENT
Start: 2017-12-26 | End: 2017-12-26

## 2017-12-25 RX ORDER — OLANZAPINE 7.5 MG/1
7.5 TABLET ORAL
Status: DISCONTINUED | OUTPATIENT
Start: 2017-12-25 | End: 2017-12-29

## 2017-12-25 RX ORDER — DOCUSATE SODIUM 100 MG/1
100 CAPSULE, LIQUID FILLED ORAL DAILY
Status: DISCONTINUED | OUTPATIENT
Start: 2017-12-25 | End: 2018-01-09 | Stop reason: HOSPADM

## 2017-12-25 RX ADMIN — LORAZEPAM 1 MG: 1 TABLET ORAL at 09:09

## 2017-12-25 RX ADMIN — CARBAMAZEPINE 400 MG: 200 TABLET ORAL at 17:47

## 2017-12-25 RX ADMIN — Medication 1 APPLICATION: at 19:10

## 2017-12-25 RX ADMIN — QUETIAPINE FUMARATE 200 MG: 200 TABLET ORAL at 09:09

## 2017-12-25 RX ADMIN — OLANZAPINE 5 MG: 5 TABLET, ORALLY DISINTEGRATING ORAL at 09:10

## 2017-12-25 RX ADMIN — CARBAMAZEPINE 200 MG: 200 TABLET ORAL at 09:10

## 2017-12-25 RX ADMIN — HALOPERIDOL LACTATE 5 MG: 5 INJECTION, SOLUTION INTRAMUSCULAR at 19:17

## 2017-12-25 RX ADMIN — QUETIAPINE FUMARATE 300 MG: 300 TABLET ORAL at 22:36

## 2017-12-25 RX ADMIN — NICOTINE POLACRILEX 2 MG: 2 GUM, CHEWING ORAL at 09:10

## 2017-12-25 RX ADMIN — DOCUSATE SODIUM 100 MG: 100 CAPSULE, LIQUID FILLED ORAL at 13:49

## 2017-12-25 RX ADMIN — LORAZEPAM 1 MG: 2 INJECTION INTRAMUSCULAR; INTRAVENOUS at 19:18

## 2017-12-25 RX ADMIN — OLANZAPINE 10 MG: 10 TABLET, FILM COATED ORAL at 17:50

## 2017-12-25 NOTE — PROGRESS NOTES
Progress Note - [address] [facility] 40 y o  female MRN: [id number]  Unit/Bed#: LS5 066-30 Encounter: [id number]    Marjorie Pinto was seen for continuing care and reviewed with treatment team   Pt reported feeling "Anxious" to go home and be with her kids  She presently denies depression, SI, HI or psychotic Sxs  She went on to talk about her step-mom who is "Not my mom" in her view  Pt states she wanted to woman out of her house, "She's crazy, she needs medication "  Pt c/o constipation and wants her eczema cream for her hands and feet  Floor team relayed Pt has been cooperative over the past 24hours but can be labile  She is visible and social in the milieu but can be a bit intrusive and also tends to make racially charged statements  Pt relayed to staff that she should not be here as a patient        Sleep: Good  Appetite: Good  Medication side effects: None per Pt  ROS: as per HPI    Labs/Tests:  Reviewed  12/22/2017 Carbamazepine level < 0 5      Mental Status Evaluation:  Appearance:  Dressed, clean, Fair hygiene, Good eye contact   Behavior:  Cooperative, and overall pleasant, but edgy   Speech:  Pressured, rapid, but normal volume   Mood:  Anxious, and appears agitated   Affect:  Constricted   Thought Process:  Relatively organized but circumferential   Associations: circumstantial associations   Thought Content:  Apparent delusion regarding her mother per staff Hx   Perceptual Disturbances: Pt denies any hallucinations or paranoia and does not appear to be responding to internal stimuli   Risk Potential: Pt presently denies SI or HI    Sensorium:  Person, Place, Day of the week, Month, Year   Memory:  short term memory grossly intact   Consciousness:  alert, awake   Attention: Good   Insight:  Limited   Judgment: Limited   Gait/Station: Normal gait/station   Motor Activity: No abnormal movements     Vitals:    12/25/17 1154   BP: 130/76   Pulse: 98   Resp: 15   Temp:        Progress Toward Goals: No apparent change  I will wean down Quetiapine (a home medicine) night dose to 300mg qhs and continue 200mg qAM, while increasing Olanzapine by 2 5mg qhs for psychosis and overall mood control  Start Colace 100mg qd for diarrhea  Repeat Carbamazepine level    Assessment/Plan   Principal Problem:    Bipolar I disorder, most recent episode mixed, severe with psychotic features ([facility] [address] )      Recommended Treatment: Continue with pharmacotherapy, group therapy, milieu therapy and occupational therapy  The patient will be maintained on the following medications:    carBAMazepine 200 mg Oral Daily   carBAMazepine 400 mg Oral After Dinner   docusate sodium 100 mg Oral Daily   nicotine 21 mg Transdermal Daily   [START ON 12/26/2017] OLANZapine 5 mg Oral Daily   OLANZapine 7 5 mg Oral HS   QUEtiapine 200 mg Oral QAM   QUEtiapine 300 mg Oral HS       Risks, benefits and possible side effects of Medications:   Risks, benefits, and possible side effects of medications explained to patient and patient verbalizes understanding  Risks of medications in pregnancy explained if female patient  Patient verbalizes understanding and agrees to notify her doctor if she becomes pregnant

## 2017-12-25 NOTE — PROGRESS NOTES
Patient requested and given Ativan for anxiety this morning but was cooperative and pleasant in conversation  Said she "has a lot on my mind" in particular her "cheating" ex, with whom she said she is now "done " Denied SI and HI  Social if a bit intrusive in the milieu  Said she wanted to take a shower because she was itchy and felt like she had "bugs" in her hair

## 2017-12-25 NOTE — PROGRESS NOTES
Pt reports Ativan given earlier was not effective because it made her "too tired to sleep" and she had wanted to take a nap

## 2017-12-25 NOTE — PLAN OF CARE
INVOLUNTARY ADMIT     Will cooperate with staff recommendations and doctor's orders and will demonstrate appropriate behavior Progressing        PSYCHOSIS     Will report no hallucinations or delusions Progressing

## 2017-12-26 LAB — CARBAMAZEPINE SERPL-MCNC: 8.3 UG/ML (ref 4–12)

## 2017-12-26 PROCEDURE — 80156 ASSAY CARBAMAZEPINE TOTAL: CPT | Performed by: PHYSICIAN ASSISTANT

## 2017-12-26 RX ORDER — QUETIAPINE FUMARATE 200 MG/1
400 TABLET, FILM COATED ORAL
Status: DISCONTINUED | OUTPATIENT
Start: 2017-12-26 | End: 2018-01-08

## 2017-12-26 RX ORDER — OLANZAPINE 5 MG/1
7.5 TABLET, ORALLY DISINTEGRATING ORAL DAILY
Status: DISCONTINUED | OUTPATIENT
Start: 2017-12-27 | End: 2018-01-02

## 2017-12-26 RX ADMIN — BENZTROPINE MESYLATE 1 MG: 1 TABLET ORAL at 01:50

## 2017-12-26 RX ADMIN — CARBAMAZEPINE 200 MG: 200 TABLET ORAL at 08:57

## 2017-12-26 RX ADMIN — Medication: at 23:58

## 2017-12-26 RX ADMIN — NICOTINE POLACRILEX 4 MG: 4 GUM, CHEWING ORAL at 14:20

## 2017-12-26 RX ADMIN — QUETIAPINE FUMARATE 200 MG: 200 TABLET ORAL at 08:57

## 2017-12-26 RX ADMIN — OLANZAPINE 5 MG: 5 TABLET, ORALLY DISINTEGRATING ORAL at 08:57

## 2017-12-26 RX ADMIN — NICOTINE POLACRILEX 2 MG: 2 GUM, CHEWING ORAL at 08:57

## 2017-12-26 RX ADMIN — CARBAMAZEPINE 400 MG: 200 TABLET ORAL at 18:00

## 2017-12-26 RX ADMIN — DOCUSATE SODIUM 100 MG: 100 CAPSULE, LIQUID FILLED ORAL at 08:57

## 2017-12-26 RX ADMIN — WATER 2.1 ML: 1 INJECTION INTRAMUSCULAR; INTRAVENOUS; SUBCUTANEOUS at 17:58

## 2017-12-26 RX ADMIN — OLANZAPINE 10 MG: 10 INJECTION, POWDER, FOR SOLUTION INTRAMUSCULAR at 17:58

## 2017-12-26 NOTE — PROGRESS NOTES
Progress Note - [address] [facility] 40 y o  female MRN: @MRN   Unit/Bed#: QD7 403-93 Encounter: [id number]        The patient was seen for continuing care and reviewed with staff  Report from staff regarding this patient received and discussed, and records reviewed prior to seeing this patient   "I am dead already"  "I vomit when I eat because there is rat poison"  Pt expressed her harmful thoughts toward her mother  More disorganized and paranoid, delusional with increased physical agitation, hitting table by her fist in front of the writer, more tense and visibly angry  Regressed in her psychosis  Unsafe if discharged  Potential for violent act upon her delusion and unable to take care of herself due to severe psychosis with poor insight  Patient remains agitated at times, distressed, easily agitated, easily distracted, frustrated, guarded, irritated, paranoid, preoccupied, suspicious and unmotivated today      Medication side effects: No   ROS: no complaints    Mental Status Evaluation:    Appearance:  dressed appropriately, casually dressed   Behavior:  angry, guarded, uncooperative, psychomotor agitation, evasive, inappropriate   Mood:  dysphoric   Affect: constricted    Speech:  pressured   Language: frequent expletives    Thought Process:  disorganized   Associations: concrete associations, incoherent   Thought Content:  paranoid delusions   Perceptual Disturbances: denies auditory or visual hallucinations when asked, but appears responding to internal stimuli   Risk Potential: Suicidal ideation - None at present  Homicidal ideation - unable to assess  Potential for aggression - Yes, due to acute psychosis   Sensorium:  oriented to person, place and time   Memory:  recent and remote memory: unable to assess due to lack of cooperation   Consciousness:  alert and awake   Attention: decreased concentration   Intellect: not examined   Fund of Knowledge: awareness of current events appropriate   Insight:  impaired due to psychosis   Judgment: impaired due to psychosis   Muscle Tone: normal   Gait/Station: normal gait/station and normal balance   Motor Activity: no abnormal movements         Laboratory results:  I have personally reviewed all pertinent laboratory results  Progress Toward Goals: regressed    Assessment/Plan   Principal Problem:    Bipolar I disorder, most recent episode mixed, severe with psychotic features ([facility] [address] )      Recommended Treatment: will return back to a higher dose of Seroquel at bedtime while slowly titrating Zyprexa, the medicine the pt on Friday stated that was helpful to her  Supportive therapy provided for angry mood  Sleep is not affected  Appetite is normal      Planned medication and treatment changes: All current active medications have been reviewed  Continue treatment with group therapy, milieu therapy, occupational therapy and medication management  Risks / Benefits of Treatment:    Risks, benefits, and possible side effects of medications explained to patient  Patient has limited understanding of risks and benefits of treatment at this time, but agrees to take medications as prescribed  Counseling / Coordination of Care:    Patient's progress discussed with staff in treatment team meeting  Medication changes reviewed with staff in treatment team meeting  ** Please Note: This note has been constructed using a voice recognition system   **

## 2017-12-26 NOTE — PROGRESS NOTES
Pt denies symptoms  "I don't Derrill Jerriie hurt myself or anybody else"  Pt reports she got "upset" last night because "I wanted to shave my arms and legs and nobody would help me with it and so I got angry"  Pt took her medications and so far has been calm and cooperative, with periods of disorganization  She attempts to socialize in the milieu
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
## 2017-12-26 NOTE — PROGRESS NOTES
Pt given PRN  Zyprexa 10 mg PO for agitation and irritability @ 17:50  Pt was at first was refusing all medications stating " I am physical ill and I wanna get the fuck out of here  I want to go home I don't belong here  " PT redirected to take medications  Pt is now out in dayroom  Calling other pts names and writing her name down as [facility]  PT is stating the "Medhat Lundberg has  " PT is irritable and laible stating she "hates fucking faggots " Pt was told she cannot be saying theses statements out in the dayroom for he own safety  PT was taken to room  PT started to tell Rn she didn't need medications and started to say " I don't need medications I'm not fucking retarded I Dali Enrique go home  That girl over there isn't Isaiah Garcia  Her name is Saurabh Whitt and she doesn't have the education to work here she needs to get the fuck out of here  Little child " Pt became threatening and stated she was going to "fucking kill staff"  Pt given IM haldol 5 mg and ativan 1 mg IM for agitation  PT started yelling at staff " Medhat Lundberg is no longer here it's Ofe Tirado Philip is dead   I have no pulse "

## 2017-12-26 NOTE — PROGRESS NOTES
Pt was awake during the night and wanted to shower at 0200 but was standing in front of this writer with her eyes closed  Pt was instructed to go back to bed  She asked for Cogentin to combat the side effects of the medications and she received it  Pt went back to bed and was asleep shortly afterwards  She awoke early and has showered

## 2017-12-27 RX ADMIN — QUETIAPINE FUMARATE 200 MG: 200 TABLET ORAL at 08:31

## 2017-12-27 RX ADMIN — OLANZAPINE 7.5 MG: 7.5 TABLET, FILM COATED ORAL at 21:47

## 2017-12-27 RX ADMIN — DOCUSATE SODIUM 100 MG: 100 CAPSULE, LIQUID FILLED ORAL at 08:31

## 2017-12-27 RX ADMIN — ACETAMINOPHEN 975 MG: 325 TABLET, FILM COATED ORAL at 02:00

## 2017-12-27 RX ADMIN — CARBAMAZEPINE 200 MG: 200 TABLET ORAL at 08:31

## 2017-12-27 RX ADMIN — QUETIAPINE FUMARATE 400 MG: 200 TABLET ORAL at 21:47

## 2017-12-27 RX ADMIN — NICOTINE POLACRILEX 4 MG: 4 GUM, CHEWING ORAL at 14:47

## 2017-12-27 RX ADMIN — NICOTINE POLACRILEX 4 MG: 4 GUM, CHEWING ORAL at 11:14

## 2017-12-27 RX ADMIN — OLANZAPINE 7.5 MG: 5 TABLET, ORALLY DISINTEGRATING ORAL at 08:33

## 2017-12-27 NOTE — PROGRESS NOTES
Progress Note - 2[address] [facility] 40 y o  female MRN: @MRN   Unit/Bed#: AW1 759-01 Encounter: [id number]        The patient was seen for continuing care and reviewed with staff  Report from staff regarding this patient received and discussed, and records reviewed prior to seeing this patient   The patient continued to be psychotically disorganized at times agitated has very poor insight into her condition the and a at times request to even deeper paranoid psychotic state  The will review his medication management the increase his Seroquel the and increase his Zyprexa  The Seroquel will be continued at previous dose while Zyprexa will be titrated to therapeutic dose  The than Seroquel will be decreased  A because of the patient disorganized paranoid and psychotic state was poor insight the patient's 302 was restarted  The the the the writer will provide a request for continuation of the patient on involuntary base on 303  Patient remains agitated, argumentative, bizarre, delusional, demanding, disorganized, distracted and distressed today      Medication side effects: No   ROS: no complaints, appetite is improved and sleep was normal     Mental Status Evaluation:    Appearance:  dressed appropriately, casually dressed   Behavior:  agitated, bizarre, demanding, guarded, uncooperative, psychomotor agitation, inappropriate   Mood:  dysphoric   Affect: constricted    Speech:  decreased rate   Language: appropriate   Thought Process:  perserverative   Associations: concrete associations   Thought Content:  paranoid and bizarre delusions   Perceptual Disturbances: no auditory hallucinations, no visual hallucinations   Risk Potential: Suicidal ideation - None at present  Homicidal ideation - None  Potential for aggression - Yes, due to poor impulse control and acute psychosis   Sensorium:  unable to assess   Memory:  recent and remote memory: unable to assess due to lack of cooperation Consciousness:  alert and awake   Attention: poor concentration   Intellect: not examined   Fund of Knowledge: past history: unable to assess   Insight:  impaired due to psychosis   Judgment: impaired due to psychosis   Muscle Tone: normal   Gait/Station: normal gait/station and normal balance   Motor Activity: no abnormal movements         Laboratory results:  I have personally reviewed all pertinent laboratory results  No results for input(s): HGBA1C, NA, K, CL, CO2, GLUCOSE, CREATININE, BUN, MG, PHOS in the last 72 hours  Invalid input(s): CA  No results for input(s): WBC, RBC, HGB, HCT, MCV, MCH, RDW, PLT in the last 72 hours  No results for input(s): CREATININE, BUN, NA, K, CL, CO2, GLUCOSE, PROT, ALT, AST, BILIDIR in the last 72 hours  Invalid input(s): CA, AKLPHOS  No results for input(s): ALKPHOS, AST, ALT, GGT, BILITOT, BILIDIR, ALBUMIN, INR, AMYLASE, LIPASE in the last 72 hours  No results for input(s): Jose Abdullahi in the last 72 hours  Invalid input(s): CKMB, PBNP  No results for input(s): CHOL, LDLDIRECT, HDL, TRIG in the last 72 hours  No results for input(s): CRP, SEDRATE, DAMI, HAV, HEPAIGM, HEPBIGM, HEPBCAB, HEPCAB in the last 72 hours  Invalid input(s): HEAG    CBC: No results for input(s): WBC, RBC, HGB, HCT, PLT in the last 72 hours  BMP: No results for input(s): NA, K, CL, CO2, BUN, GLU in the last 72 hours  Invalid input(s): CREA        Progress Toward Goals: no significant improvement    Assessment/Plan   Principal Problem:    Bipolar I disorder, most recent episode mixed, severe with psychotic features ([facility] [address] )      Recommended Treatment:  Patient's psychotic symptoms the a not improving, on country regressed to deeper level of psychotic and agitation  Will increase his Seroquel the and increase his Zyprexa  The Seroquel will be continued at previous dose while Zyprexa will be titrated to therapeutic dose  The than Seroquel will be decreased    A because of the patient disorganized paranoid and psychotic state was poor insight the patient's 302 was restarted  The the the the writer will provide a request for continuation of the patient on involuntary base on 303  Her sleep was disturbed but appetite improved        Planned medication and treatment changes: All current active medications have been reviewed  Continue treatment with group therapy, milieu therapy, occupational therapy and medication management  Risks / Benefits of Treatment:    Risks, benefits, and possible side effects of medications explained to patient  Patient has limited understanding of risks and benefits of treatment at this time, but agrees to take medications as prescribed  Counseling / Coordination of Care:    Patient's progress discussed with staff in treatment team meeting  Medication changes reviewed with staff in treatment team meeting  ** Please Note: This note has been constructed using a voice recognition system   **

## 2017-12-27 NOTE — PROGRESS NOTES
Pt given PRN Tylenol 975mg for 10/10 L leg pain @0200   Medication appears to be effective as pt is currently back in bed sleeping

## 2017-12-27 NOTE — PROGRESS NOTES
Patient wants to argue about medication and complaining that she doesn't take medication 4 times a day  She actually receives medication x 3/24hrs  Irritable and demanding  Still has not taken Tegretol dose for 1800 despite being given direction to take scheduled medication
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
## 2017-12-27 NOTE — CASE MANAGEMENT
303 paperwork completed  SW called [address], [address], [phone], [id number], to schedule 303 hearing   Left message @ 11:50 AM

## 2017-12-27 NOTE — PROGRESS NOTES
PT given PRN Zyprexa 10 ,g IM  PT was irritable and liable  PT became demanding and unreasonable during dinner about getting more food  PT was told there was no extra food  PT then called other pts out in dayroom "fat fucks"  PT removed from dayroom and taken to room  Security called  PT was moved to room closer to nurses station  Pt was verbal abusive to staff   PT currently in bed laying down calmly

## 2017-12-27 NOTE — PLAN OF CARE
Problem: Ineffective Coping  Goal: Participates in unit activities  Interventions:  - Provide therapeutic environment   - Provide required programming   - Redirect inappropriate behaviors    Outcome: Completed Date Met: 12/27/17

## 2017-12-28 RX ADMIN — CARBAMAZEPINE 200 MG: 200 TABLET ORAL at 08:34

## 2017-12-28 RX ADMIN — CARBAMAZEPINE 400 MG: 200 TABLET ORAL at 18:06

## 2017-12-28 RX ADMIN — NICOTINE POLACRILEX 4 MG: 4 GUM, CHEWING ORAL at 10:10

## 2017-12-28 RX ADMIN — QUETIAPINE FUMARATE 400 MG: 200 TABLET ORAL at 22:19

## 2017-12-28 RX ADMIN — LORAZEPAM 1 MG: 1 TABLET ORAL at 11:05

## 2017-12-28 RX ADMIN — QUETIAPINE FUMARATE 200 MG: 200 TABLET ORAL at 08:34

## 2017-12-28 RX ADMIN — DOCUSATE SODIUM 100 MG: 100 CAPSULE, LIQUID FILLED ORAL at 08:33

## 2017-12-28 RX ADMIN — OLANZAPINE 7.5 MG: 7.5 TABLET, FILM COATED ORAL at 22:19

## 2017-12-28 RX ADMIN — OLANZAPINE 10 MG: 10 TABLET, FILM COATED ORAL at 13:22

## 2017-12-28 RX ADMIN — OLANZAPINE 7.5 MG: 5 TABLET, ORALLY DISINTEGRATING ORAL at 08:33
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
## 2017-12-28 NOTE — CASE MANAGEMENT
CATERINA called [address], [address], to schedule 303 hearing  She was out of the office yesterday, & the fill-in person, Irene Villavicencio, never returned CATERINA's call to schedule hearing  Virkstephanie Call is due back in her office today   Left message @ 8:10 AM

## 2017-12-28 NOTE — PROGRESS NOTES
Progress Note - 2[address] [facility] 40 y o  female MRN: @MRN   Unit/Bed#: IF9 759-01 Encounter: [id number]        The patient was seen for continuing care and reviewed with staff  Report from staff regarding this patient received and discussed, and records reviewed prior to seeing this patient   The patient continued to be psychotically preoccupied, believing that her mother has a plot to kill her, poison her food, and I delusional and disorganized  The the patient has very poor insight into her condition  Three or 3 involuntary petition was submitted by is a writer because inpatient treatment is the least restrictive measures at this time  Will continue her present medications and will consider to further increase patient's Zyprexa tomorrow  Patient remains agitated at times, argumentative, bizarre, disoriented, distracted and distressed today      Medication side effects: No   ROS: no complaints    Mental Status Evaluation:    Appearance:  dressed appropriately, casually dressed   Behavior:  demanding, guarded, uncooperative, psychomotor agitation   Mood:  dysphoric   Affect: constricted    Speech:  pressured, hypertalkative   Language: appropriate   Thought Process:  circumstantial   Associations: concrete associations   Thought Content:  paranoid delusions   Perceptual Disturbances: no auditory hallucinations, no visual hallucinations   Risk Potential: Suicidal ideation - None at present  Homicidal ideation - None at present  Potential for aggression - Yes, due to agitation   Sensorium:  unable to assess   Memory:  recent and remote memory: unable to assess due to lack of cooperation   Consciousness:  alert and awake   Attention: decreased concentration   Intellect: not examined   Fund of Knowledge: past history: unable to assess   Insight:  impaired due to psychosis   Judgment: impaired due to psychosis   Muscle Tone: normal   Gait/Station: normal gait/station and normal balance   Motor Activity: no abnormal movements         Laboratory results:  I have personally reviewed all pertinent laboratory results  No results for input(s): HGBA1C, NA, K, CL, CO2, GLUCOSE, CREATININE, BUN, MG, PHOS in the last 72 hours  Invalid input(s): CA  No results for input(s): WBC, RBC, HGB, HCT, MCV, MCH, RDW, PLT in the last 72 hours  No results for input(s): CREATININE, BUN, NA, K, CL, CO2, GLUCOSE, PROT, ALT, AST, BILIDIR in the last 72 hours  Invalid input(s): CA, AKLPHOS  No results for input(s): ALKPHOS, AST, ALT, GGT, BILITOT, BILIDIR, ALBUMIN, INR, AMYLASE, LIPASE in the last 72 hours  No results for input(s): Rozann Humbles in the last 72 hours  Invalid input(s): CKMB, PBNP  No results for input(s): CHOL, LDLDIRECT, HDL, TRIG in the last 72 hours  No results for input(s): CRP, SEDRATE, DAMI, HAV, HEPAIGM, HEPBIGM, HEPBCAB, HEPCAB in the last 72 hours  Invalid input(s): HEAG    CBC: No results for input(s): WBC, RBC, HGB, HCT, PLT in the last 72 hours  BMP: No results for input(s): NA, K, CL, CO2, BUN, GLU in the last 72 hours  Invalid input(s): CREA        Progress Toward Goals: no significant improvement    Assessment/Plan   Principal Problem:    Bipolar I disorder, most recent episode mixed, severe with psychotic features ([facility] [address] )      Recommended Treatment:  The patient received higher dose of Zyprexa 7 5 mg and will receive the same 7 5 at bedtime  Will continue was Seroquel 600 mg in 2 divided dosages  The patient continued to be disorganized and psychotic without improvement    Her sleep was okay and appetite is normal          Current Facility-Administered Medications:     acetaminophen (TYLENOL) tablet 650 mg, 650 mg, Oral, Q6H PRN, Tayler Klein MD    acetaminophen (TYLENOL) tablet 975 mg, 975 mg, Oral, Q6H PRN, Tayler Klein MD, 975 mg at 12/27/17 0200    aluminum-magnesium hydroxide-simethicone (MYLANTA) 200-200-20 mg/5 mL oral suspension 30 mL, 30 mL, Oral, Q4H PRN, Felicitas Izaguirre MD    ammonium lactate (LAC-HYDRIN) 12 % cream, , Topical, BID PRN, Delphine Dasilva MD    benztropine (COGENTIN) injection 1 mg, 1 mg, Intramuscular, Q6H PRN, Felicitas Izaguirre MD    benztropine (COGENTIN) tablet 1 mg, 1 mg, Oral, Q6H PRN, Felicitas Izaguirre MD, 1 mg at 12/26/17 0150    carBAMazepine (TEGretol) tablet 200 mg, 200 mg, Oral, Daily, Delphine Dasilva MD, 200 mg at 12/27/17 0831    carBAMazepine (TEGretol) tablet 400 mg, 400 mg, Oral, After Eric Reilly MD, 400 mg at 12/26/17 1800    docusate sodium (COLACE) capsule 100 mg, 100 mg, Oral, Daily, Renetta Mascorro PA-C, 100 mg at 12/27/17 0831    haloperidol (HALDOL) tablet 5 mg, 5 mg, Oral, Q6H PRN, Felicitas Izaguirre MD    haloperidol lactate (HALDOL) injection 5 mg, 5 mg, Intramuscular, Q6H PRN, Felicitas Izaguirre MD, 5 mg at 12/25/17 1917    hydrOXYzine HCL (ATARAX) tablet 25 mg, 25 mg, Oral, Q6H PRN, Felicitas Izaguirre MD    hydrOXYzine HCL (ATARAX) tablet 50 mg, 50 mg, Oral, BID PRN, Delphine Dasilva MD    ibuprofen (MOTRIN) tablet 600 mg, 600 mg, Oral, Q8H PRN, Felicitas Izaguirre MD    LORazepam (ATIVAN) 2 mg/mL injection 1 mg, 1 mg, Intramuscular, Q4H PRN, Felicitas Izaguirre MD, 1 mg at 12/25/17 1918    LORazepam (ATIVAN) tablet 1 mg, 1 mg, Oral, Q6H PRN, Felicitas Izaguirre MD, 1 mg at 12/25/17 0909    magnesium hydroxide (MILK OF MAGNESIA) 400 mg/5 mL oral suspension 30 mL, 30 mL, Oral, Daily PRN, MD Chuck Stover  nicotine (NICODERM CQ) 21 mg/24 hr TD 24 hr patch 21 mg, 21 mg, Transdermal, Daily, Jania Ritchie MD, Stopped at 12/26/17 0900    nicotine polacrilex (NICORETTE) gum 4 mg, 4 mg, Oral, Q2H PRN, Delphine Dasilva MD, 4 mg at 12/27/17 1447    OLANZapine (ZyPREXA ZYDIS) dispersible tablet 7 5 mg, 7 5 mg, Oral, Daily, Delphine Dasilva MD, 7 5 mg at 12/27/17 0833    OLANZapine (ZyPREXA) IM injection 10 mg, 10 mg, Intramuscular, Q3H PRN, Felicitas Izaguirre MD, 10 mg at 12/26/17 1758    OLANZapine (ZyPREXA) tablet 10 mg, 10 mg, Oral, Q3H PRN, Katelynn Kelly MD, 10 mg at 12/25/17 1750    OLANZapine (ZyPREXA) tablet 7 5 mg, 7 5 mg, Oral, HS, Renetta Mascorro PA-C, 7 5 mg at 12/27/17 2147    QUEtiapine (SEROquel) tablet 200 mg, 200 mg, Oral, QAM, Shahnaz Jones MD, 200 mg at 12/27/17 0831    QUEtiapine (SEROquel) tablet 400 mg, 400 mg, Oral, HS, Shahnaz Jones MD, 400 mg at 12/27/17 2147    traZODone (DESYREL) tablet 50 mg, 50 mg, Oral, HS PRN, Katelynn Kelly MD    Planned medication and treatment changes: All current active medications have been reviewed  Continue treatment with group therapy, milieu therapy, occupational therapy and medication management  Risks / Benefits of Treatment:    Risks, benefits, and possible side effects of medications explained to patient  Patient has limited understanding of risks and benefits of treatment at this time, but agrees to take medications as prescribed  Counseling / Coordination of Care:    Patient's progress discussed with staff in treatment team meeting  Medication changes reviewed with staff in treatment team meeting  Importance of medication and treatment compliance reviewed with patient  ** Please Note: This note has been constructed using a voice recognition system   **

## 2017-12-28 NOTE — CASE MANAGEMENT
SW  rec'd call from Roxann Mcleod, [address] [facility] [address]  Guanakito Aguirre of need for 303 hearing  Maxine Leach said she needed the origial 302 that was done prior to admission  Said she had current copy of 302  SW faxed above to Maxine DIGGS rec'd hearing notice for tomorrow @ 11 AM  SW met with pt, who was in her room  SW advised of above  Pt angry  Seems pt just had verbal altercation with her nurse  Pt said she wanted to "get out of here"  Said her mother, Wiliam Taylor, belonged here  303 rts reviewed & copy given to pt  Pt appropriate when speaking to SW   Not happy that she is in hospital

## 2017-12-28 NOTE — PROGRESS NOTES
Patient came for HS meds which are Seroquel and Zyprexa  She talked about her Tegretol and she was reminded she had refused the Tegretol at 6pm "So give it to me now, double it up " Told patient that was not possible and she said "I took mine " Reality presented to her and reminded her of the context I e   Being argumentative and verbally abusive when she was asked to take the Tegretol and refusing to take it -  she said "It wasn't me, it must have been somebody else "

## 2017-12-29 RX ORDER — OLANZAPINE 10 MG/1
10 TABLET ORAL
Status: DISCONTINUED | OUTPATIENT
Start: 2017-12-30 | End: 2018-01-02

## 2017-12-29 RX ORDER — HYDROXYZINE 50 MG/1
50 TABLET, FILM COATED ORAL EVERY 6 HOURS PRN
Status: DISCONTINUED | OUTPATIENT
Start: 2017-12-29 | End: 2018-01-09 | Stop reason: HOSPADM

## 2017-12-29 RX ADMIN — QUETIAPINE FUMARATE 400 MG: 200 TABLET ORAL at 21:55

## 2017-12-29 RX ADMIN — NICOTINE POLACRILEX 4 MG: 4 GUM, CHEWING ORAL at 08:39

## 2017-12-29 RX ADMIN — OLANZAPINE 7.5 MG: 5 TABLET, ORALLY DISINTEGRATING ORAL at 08:37

## 2017-12-29 RX ADMIN — NICOTINE POLACRILEX 4 MG: 4 GUM, CHEWING ORAL at 19:14

## 2017-12-29 RX ADMIN — NICOTINE POLACRILEX 4 MG: 4 GUM, CHEWING ORAL at 12:16

## 2017-12-29 RX ADMIN — HYDROXYZINE HYDROCHLORIDE 50 MG: 50 TABLET, FILM COATED ORAL at 10:36

## 2017-12-29 RX ADMIN — Medication 1 APPLICATION: at 18:20

## 2017-12-29 RX ADMIN — QUETIAPINE FUMARATE 200 MG: 200 TABLET ORAL at 08:36

## 2017-12-29 RX ADMIN — CARBAMAZEPINE 400 MG: 200 TABLET ORAL at 18:05

## 2017-12-29 RX ADMIN — OLANZAPINE 7.5 MG: 7.5 TABLET, FILM COATED ORAL at 21:55

## 2017-12-29 RX ADMIN — DOCUSATE SODIUM 100 MG: 100 CAPSULE, LIQUID FILLED ORAL at 08:36

## 2017-12-29 RX ADMIN — CARBAMAZEPINE 200 MG: 200 TABLET ORAL at 08:36

## 2017-12-29 NOTE — PLAN OF CARE
Problem: PSYCHOSIS  Goal: Will report no hallucinations or delusions  Interventions:  - Administer medication as  ordered  - Every waking shifts and PRN assess for the presence of hallucinations and or delusions  - Assist with reality testing to support increasing orientation  - Assess if patient's hallucinations or delusions are encouraging self-harm or harm to others and intervene as appropriate   Outcome: Progressing      Problem: INVOLUNTARY ADMIT  Goal: Will cooperate with staff recommendations and doctor's orders and will demonstrate appropriate behavior  INTERVENTIONS:  - Treat underlying conditions and offer medication as ordered  - Educate regarding involuntary admission procedures and rules  - Utilize positive consistent limit setting strategies to support patient and staff safety   Outcome: Not Progressing  Patient required to receive prn medication x 2 today

## 2017-12-29 NOTE — PROGRESS NOTES
Atarax effective for anxiety per pt  States that she was anxious about " Mabel June " states that " Mabel June" needs help and that her "father" needs top treat her better," Referring to this [facility] [place] [facility] " Thoughts remain disorganized  Pt was writing a list of what she needs to do after d/c  Pt had different names on there and states that they are "the kids that she knows of " Pt states that she was feeling anxious because of " Mabel June " Denies SI  Compliant with medications  No acting out behavior
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
## 2017-12-29 NOTE — PROGRESS NOTES
Patient has been very low key this evening following the prn medication she received earlier today  She was cooperative with Tegretol this evening

## 2017-12-29 NOTE — PROGRESS NOTES
Patient is irritable again by evening - not coming to get HS meds and arguing and blaming when they were brought to her  She shows no insight into her need for medication

## 2017-12-29 NOTE — PROGRESS NOTES
Progress Note - [address] [facility] 40 y o  female MRN: @MRN   Unit/Bed#: OP1 759-01 Encounter: [id number]        The patient was seen for continuing care and reviewed with staff  Report from staff regarding this patient received and discussed, and records reviewed prior to seeing this patient   The patient today still had paranoid ideations about her mother trying to kill her, was disorganized in her presentation and thought pattern, however, she was less angry, more calm and tolerated the interview better  Will slowly cross titrate Seroquel with Zyprexa  Patient remains anxious, bizarre, distracted and distressed today  Medication side effects: No   ROS: no complaints    Mental Status Evaluation:    Appearance:  dressed appropriately, casually dressed   Behavior:  cooperative   Mood:  dysphoric, anxious   Affect: constricted    Speech:  hypertalkative   Language: appropriate   Thought Process:  loose associations and tangential   Associations: tangential associations, incoherent   Thought Content:  paranoid ideation   Perceptual Disturbances: no auditory hallucinations, no visual hallucinations, denies auditory hallucinations when asked, does not appear responding to internal stimuli   Risk Potential: Suicidal ideation - None at present  Homicidal ideation - None  Potential for aggression - Yes, due to acute psychosis   Sensorium:  oriented to person, place and time   Memory:  recent and remote memory grossly intact   Consciousness:  alert and awake   Attention: decreased attention span   Intellect: not examined   Fund of Knowledge: awareness of current events appropriate   Insight:  impaired due to psychosis   Judgment: impaired due to psychosis   Muscle Tone: normal   Gait/Station: normal gait/station and normal balance   Motor Activity: no abnormal movements         Laboratory results:  I have personally reviewed all pertinent laboratory results  No results for input(s):  HGBA1C, NA, K, CL, CO2, GLUCOSE, CREATININE, BUN, MG, PHOS in the last 72 hours  Invalid input(s): CA  No results for input(s): WBC, RBC, HGB, HCT, MCV, MCH, RDW, PLT in the last 72 hours  No results for input(s): CREATININE, BUN, NA, K, CL, CO2, GLUCOSE, PROT, ALT, AST, BILIDIR in the last 72 hours  Invalid input(s): CA, AKLPHOS  No results for input(s): ALKPHOS, AST, ALT, GGT, BILITOT, BILIDIR, ALBUMIN, INR, AMYLASE, LIPASE in the last 72 hours  No results for input(s): [facility] in the last 72 hours  Invalid input(s): CKMB, PBNP  No results for input(s): CHOL, LDLDIRECT, HDL, TRIG in the last 72 hours  No results for input(s): CRP, SEDRATE, DAMI, HAV, HEPAIGM, HEPBIGM, HEPBCAB, HEPCAB in the last 72 hours  Invalid input(s): HEAG    CBC: No results for input(s): WBC, RBC, HGB, HCT, PLT in the last 72 hours  BMP: No results for input(s): NA, K, CL, CO2, BUN, GLU in the last 72 hours  Invalid input(s): CREA        Progress Toward Goals: limited improvement    Assessment/Plan   Principal Problem:    Bipolar I disorder, most recent episode mixed, severe with psychotic features ([facility] [address] )      Recommended Treatment:  Today was the 1st day when the writer observe some improvement in patient's overall psychotic and disorganized state  Was not agitated or demanding  Will continue him medications without changes for 1 more day and then increase Zyprexa and slowly started at decrease Seroquel  Supportive therapy was provided to deal was chronic stressors associated was paranoid ideations  The patient's sleep was improved no need to change her p r n  medication    Planned medication and treatment changes: All current active medications have been reviewed  Continue treatment with group therapy, milieu therapy, occupational therapy and medication management        Risks / Benefits of Treatment:    Risks, benefits, and possible side effects of medications explained to patient and patient verbalizes understanding and agreement for treatment  Counseling / Coordination of Care:    Patient's progress discussed with staff in treatment team meeting  Medication changes reviewed with staff in treatment team meeting  Medication education provided to patient  Supportive therapy provided to patient  Importance of medication and treatment compliance reviewed with patient  ** Please Note: This note has been constructed using a voice recognition system   **

## 2017-12-29 NOTE — PROGRESS NOTES
Irritable, pressured, dissatisfied about everything, meals - not getting enough of the right foods, getting food she does not order etc ,, meds - wrong meds, too many times a day, doses are too much etc ,

## 2017-12-29 NOTE — CASE MANAGEMENT
303 hearing held  Pt attended  Argumentative  Repeated that her mother, who's not her mother, should be the one in the hospital  Pt said SHE did not need to be in hospital  Talked over court staff at times  Talk laced with expletives  Pt felt she should be D/C  Pt committed for up to 20 days

## 2017-12-30 RX ADMIN — OLANZAPINE 7.5 MG: 5 TABLET, ORALLY DISINTEGRATING ORAL at 08:29

## 2017-12-30 RX ADMIN — CARBAMAZEPINE 400 MG: 200 TABLET ORAL at 17:30

## 2017-12-30 RX ADMIN — CARBAMAZEPINE 200 MG: 200 TABLET ORAL at 08:28

## 2017-12-30 RX ADMIN — QUETIAPINE FUMARATE 400 MG: 200 TABLET ORAL at 21:34

## 2017-12-30 RX ADMIN — NICOTINE POLACRILEX 4 MG: 4 GUM, CHEWING ORAL at 12:20

## 2017-12-30 RX ADMIN — OLANZAPINE 10 MG: 10 TABLET, FILM COATED ORAL at 16:36

## 2017-12-30 RX ADMIN — Medication: at 10:31

## 2017-12-30 RX ADMIN — NICOTINE 21 MG: 21 PATCH, EXTENDED RELEASE TRANSDERMAL at 18:10

## 2017-12-30 RX ADMIN — NICOTINE POLACRILEX 4 MG: 4 GUM, CHEWING ORAL at 08:33

## 2017-12-30 RX ADMIN — ACETAMINOPHEN 650 MG: 325 TABLET, FILM COATED ORAL at 04:14

## 2017-12-30 RX ADMIN — OLANZAPINE 10 MG: 10 TABLET, FILM COATED ORAL at 21:34

## 2017-12-30 RX ADMIN — NICOTINE POLACRILEX 4 MG: 4 GUM, CHEWING ORAL at 05:39

## 2017-12-30 RX ADMIN — DOCUSATE SODIUM 100 MG: 100 CAPSULE, LIQUID FILLED ORAL at 08:30

## 2017-12-30 RX ADMIN — QUETIAPINE FUMARATE 200 MG: 200 TABLET ORAL at 08:28

## 2017-12-30 RX ADMIN — NICOTINE POLACRILEX 4 MG: 4 GUM, CHEWING ORAL at 16:35

## 2017-12-30 NOTE — CMS CERTIFICATION NOTE
Certification: Based upon physical, mental and social evaluations, I certify that inpatient psychiatric services are medically necessary for this patient for a duration of 20 midnights for the treatment of Bipolar I disorder, most recent episode mixed, severe with psychotic features [facility])  Available alternative community resources do not meet the patient's mental health care needs  I further attest that an established written individualized plan of care has been implemented and is outlined in the patient's medical records

## 2017-12-30 NOTE — PROGRESS NOTES
Patient said her name is now [facility] and she had it changed legally today  Labile, disorganized and still suspicious

## 2017-12-30 NOTE — PROGRESS NOTES
Progress Note - [address] 40 y o  female MRN: [id number]   Unit/Bed#: NW7 759-01 Encounter: [id number]    Behavior over the last 24 hours: unchanged  Horacio Hernández remains argumentative and labile  Her thoughts are still illogical and disorganized, she still has rambling, pressured speech  States she wants to change her name to "Ofe"  Still focusing on issues with her mother "she is not my mother"  Limited participation in milieu  Has been taking medications  Sleep: insomnia, slept off and on  Appetite: normal  Medication side effects: No   ROS: no complaints, denies any shortness of breath, chest pain or abdominal pain    Mental Status Evaluation:    Appearance:  casually dressed   Behavior:  bizarre, restless and fidgety   Speech:  pressured, loud   Mood:  dysphoric, labile   Affect:  labile   Thought Process:  disorganized, illogical   Associations: tangential associations, flight of ideas   Thought Content:  paranoid delusions   Perceptual Disturbances: denies auditory or visual hallucinations when asked, but appears preoccupied   Risk Potential: Suicidal ideation - None  Homicidal ideation - None  Potential for aggression - Yes, due to poor impulse control   Sensorium:  oriented to person, place and time/date   Memory:  recent and remote memory grossly intact   Consciousness:  alert and awake   Attention: poor concentration and poor attention span   Insight:  poor   Judgment: poor   Gait/Station: normal gait/station and normal balance   Motor Activity: no abnormal movements     Vital signs in last 24 hours:    Temp:  [97 7 °F (36 5 °C)-97 8 °F (36 6 °C)] 97 7 °F (36 5 °C)  HR:  [88-94] 88  Resp:  [16] 16  BP: (127-130)/(60-77) 127/77    Laboratory results:  I have personally reviewed all pertinent laboratory/tests results      Most Recent Labs:   Lab Results   Component Value Date    WBC 8 08 12/22/2017    RBC 4 89 12/22/2017    HGB 14 5 12/22/2017    HCT 43 6 12/22/2017     12/22/2017    RDW 14 5 12/22/2017    NEUTROABS 4 45 12/22/2017     12/22/2017    K 3 7 12/22/2017     12/22/2017    CO2 25 12/22/2017    BUN 14 12/22/2017    CREATININE 0 81 12/22/2017    GLUCOSE 99 12/22/2017    CALCIUM 8 9 12/22/2017    AST 8 12/22/2017    ALT 32 12/22/2017    ALKPHOS 71 12/22/2017    PROT 7 6 12/22/2017    ALBUMIN 3 7 12/22/2017    BILITOT 0 32 12/22/2017    CHOL 213 (H) 12/22/2017    HDL 65 (H) 12/22/2017    TRIG 84 12/22/2017    LDLCALC 131 (H) 12/22/2017    CARBAMAZEPIN 8 3 12/26/2017    WYB9GSPAKIUN 0 875 12/22/2017    PREGSERUM Negative 12/22/2017    RPR Non-Reactive 12/22/2017   Drug Screen:   Lab Results   Component Value Date    AMPMETHUR Negative 12/21/2017    BARBTUR Negative 12/21/2017    BDZUR Negative 12/21/2017    THCUR Negative 12/21/2017    COCAINEUR Negative 12/21/2017    METHADONEUR Negative 12/21/2017    OPIATEUR Negative 12/21/2017    PCPUR Negative 12/21/2017       Progress Toward Goals: no significant progress, still labile, poor insight, still has psychotic symptoms    Assessment/Plan   Principal Problem:    Bipolar I disorder, most recent episode mixed, severe with psychotic features ([address] )  Active Problems:    Cannabis abuse    Recommended Treatment:     Planned medication and treatment changes: All current active medications have been reviewed  Encourage group therapy, milieu therapy and occupational therapy  Behavioral Health checks every 15 minutes  On 303 commitment up to 20 days  Recheck Tegretol level in 3 days     Continue current medications:      carBAMazepine 200 mg Oral Daily   carBAMazepine 400 mg Oral After Dinner   docusate sodium 100 mg Oral Daily   nicotine 21 mg Transdermal Daily   OLANZapine 7 5 mg Oral Daily   OLANZapine 10 mg Oral HS   QUEtiapine 200 mg Oral QAM   QUEtiapine 400 mg Oral HS       Risks / Benefits of Treatment:    Risks, benefits, and possible side effects of medications explained to patient   Patient has limited understanding of risks and benefits of treatment at this time, but agrees to take medications as prescribed  Risks of medications in pregnancy explained if female patient  Patient verbalizes understanding and agrees to notify her doctor if she becomes pregnant  Counseling / Coordination of Care:    Patient's progress reviewed with nursing staff  Medications, treatment progress and treatment plan reviewed with patient

## 2017-12-30 NOTE — PROGRESS NOTES
Pt noted to have increase agitation d/t pt stating she is missing her wallet  Pt was told she did not come in with a wallet  Pt insists she did but she will check with her boyfriend  PRN Zyprexa given  Pt denies SI; reports being irritated   Will continue to montior

## 2017-12-30 NOTE — PROGRESS NOTES
Patient has been relatively calm and cooperative this morning  Denies symptoms and needs, though her affect is depressed  Said she's just "keeping busy " Currently out in the milieu, coloring and watching a movie
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
## 2017-12-31 RX ORDER — GABAPENTIN 100 MG/1
200 CAPSULE ORAL 2 TIMES DAILY
Status: COMPLETED | OUTPATIENT
Start: 2017-12-31 | End: 2017-12-31

## 2017-12-31 RX ORDER — GABAPENTIN 300 MG/1
300 CAPSULE ORAL 2 TIMES DAILY
Status: DISCONTINUED | OUTPATIENT
Start: 2018-01-01 | End: 2018-01-09 | Stop reason: HOSPADM

## 2017-12-31 RX ORDER — BACITRACIN, NEOMYCIN, POLYMYXIN B 400; 3.5; 5 [USP'U]/G; MG/G; [USP'U]/G
1 OINTMENT TOPICAL 2 TIMES DAILY PRN
Status: DISCONTINUED | OUTPATIENT
Start: 2017-12-31 | End: 2018-01-09 | Stop reason: HOSPADM

## 2017-12-31 RX ADMIN — CARBAMAZEPINE 200 MG: 200 TABLET ORAL at 08:48

## 2017-12-31 RX ADMIN — NICOTINE POLACRILEX 4 MG: 4 GUM, CHEWING ORAL at 08:50

## 2017-12-31 RX ADMIN — OLANZAPINE 7.5 MG: 5 TABLET, ORALLY DISINTEGRATING ORAL at 08:49

## 2017-12-31 RX ADMIN — DOCUSATE SODIUM 100 MG: 100 CAPSULE, LIQUID FILLED ORAL at 08:48

## 2017-12-31 RX ADMIN — CARBAMAZEPINE 400 MG: 200 TABLET ORAL at 17:03

## 2017-12-31 RX ADMIN — GABAPENTIN 200 MG: 100 CAPSULE ORAL at 17:03

## 2017-12-31 RX ADMIN — QUETIAPINE FUMARATE 200 MG: 200 TABLET ORAL at 08:48

## 2017-12-31 RX ADMIN — OLANZAPINE 10 MG: 10 TABLET, FILM COATED ORAL at 17:02

## 2017-12-31 RX ADMIN — QUETIAPINE FUMARATE 400 MG: 200 TABLET ORAL at 21:11

## 2017-12-31 RX ADMIN — NICOTINE POLACRILEX 4 MG: 4 GUM, CHEWING ORAL at 15:37

## 2017-12-31 RX ADMIN — GABAPENTIN 200 MG: 100 CAPSULE ORAL at 11:26

## 2017-12-31 RX ADMIN — OLANZAPINE 10 MG: 10 TABLET, FILM COATED ORAL at 21:12

## 2017-12-31 RX ADMIN — NICOTINE POLACRILEX 4 MG: 4 GUM, CHEWING ORAL at 13:18

## 2017-12-31 NOTE — PROGRESS NOTES
Progress Note - [address] 40 y o  female MRN: [id number]   Unit/Bed#: NW7 759-01 Encounter: [id number]    Behavior over the last 24 hours: minimal improvement  Marjorie Pinto still has irritability, labile mood and pressured speech  She is still paranoid about her mother and also makes grandiose statements, but otherwise she seems more cooperative during the session and slightly calmer  Compliant with medications  Attends some groups  Sleep: slept off and on  Appetite: normal  Medication side effects: No   ROS: reports dry skin, denies any chest pain or abdominal pain    Mental Status Evaluation:    Appearance:  casually dressed   Behavior:  cooperative with session, bizarre statements   Speech:  pressured, loud   Mood:  dysphoric, labile   Affect:  labile   Thought Process:  disorganized, illogical   Associations: tangential associations, flight of ideas   Thought Content:  paranoid delusions, grandiose ideas   Perceptual Disturbances: denies auditory or visual hallucinations when asked, but appears preoccupied   Risk Potential: Suicidal ideation - None  Homicidal ideation - None  Potential for aggression - Yes, due to poor impulse control   Sensorium:  oriented to person, place and time/date   Memory:  recent and remote memory grossly intact   Consciousness:  alert and awake   Attention: poor concentration and poor attention span   Insight:  significantly impaired   Judgment: significantly impaired   Gait/Station: normal gait/station and normal balance   Motor Activity: no abnormal movements     Vital signs in last 24 hours:    Temp:  [97 9 °F (36 6 °C)-98 °F (36 7 °C)] 98 °F (36 7 °C)  HR:  [83-91] 83  Resp:  [16] 16  BP: (139-141)/(67-77) 141/67    Laboratory results:  I have personally reviewed all pertinent laboratory/tests results      Progress Toward Goals: minimal progress, still irritable, remains labile, more cooperative    Assessment/Plan   Principal Problem:    Bipolar I disorder, most recent episode mixed, severe with psychotic features ([facility] [address] )  Active Problems:    Cannabis abuse    Recommended Treatment:     Planned medication and treatment changes: All current active medications have been reviewed  Encourage group therapy, milieu therapy and occupational therapy  Behavioral Health checks every 15 minutes  On 303 commitment up to 20 days  Increase Neurontin to 300 mg bid  Recheck Tegretol level in 2 days     Continue all other medications:      carBAMazepine 200 mg Oral Daily   carBAMazepine 400 mg Oral After Dinner   docusate sodium 100 mg Oral Daily   gabapentin 200 mg Oral BID   [START ON 1/1/2018] gabapentin 300 mg Oral BID   nicotine 21 mg Transdermal Daily   OLANZapine 7 5 mg Oral Daily   OLANZapine 10 mg Oral HS   QUEtiapine 200 mg Oral QAM   QUEtiapine 400 mg Oral HS       Risks / Benefits of Treatment:    Risks, benefits, and possible side effects of medications explained to patient  Patient has limited understanding of risks and benefits of treatment at this time, but agrees to take medications as prescribed  Risks of medications in pregnancy explained if female patient  Patient verbalizes understanding and agrees to notify her doctor if she becomes pregnant  Counseling / Coordination of Care:    Patient's progress reviewed with nursing staff  Medications, treatment progress and treatment plan reviewed with patient

## 2017-12-31 NOTE — PROGRESS NOTES
Pt has pressured and rambling speech  Pt lacks insight into her illness and still maintains that she was falsely placed by someone who is pretending to be her mother

## 2017-12-31 NOTE — PROGRESS NOTES
Pleasant demeanor as of this time; however, is rambling, grandiose and pressured in speech and content  Social periodically with select peers, paces the unit, poor insight  Pt did take her medications cooperatively

## 2017-12-31 NOTE — PROGRESS NOTES
Pt requested nicotine patch at 1810 after refusing it this morning  Pt approached nursing station and removed nicotine patch at 2015 and gave it to charge nurse

## 2018-01-01 RX ORDER — AMMONIUM LACTATE 12 G/100G
CREAM TOPICAL 2 TIMES DAILY
Status: DISCONTINUED | OUTPATIENT
Start: 2018-01-01 | End: 2018-01-09 | Stop reason: HOSPADM

## 2018-01-01 RX ORDER — IODINE/SODIUM IODIDE 2 %
TINCTURE TOPICAL 3 TIMES DAILY PRN
Status: DISCONTINUED | OUTPATIENT
Start: 2018-01-01 | End: 2018-01-09 | Stop reason: HOSPADM

## 2018-01-01 RX ADMIN — NICOTINE POLACRILEX 4 MG: 4 GUM, CHEWING ORAL at 08:42

## 2018-01-01 RX ADMIN — CARBAMAZEPINE 200 MG: 200 TABLET ORAL at 08:41

## 2018-01-01 RX ADMIN — DOCUSATE SODIUM 100 MG: 100 CAPSULE, LIQUID FILLED ORAL at 08:41

## 2018-01-01 RX ADMIN — NICOTINE 21 MG: 21 PATCH, EXTENDED RELEASE TRANSDERMAL at 08:41

## 2018-01-01 RX ADMIN — QUETIAPINE FUMARATE 400 MG: 200 TABLET ORAL at 21:23

## 2018-01-01 RX ADMIN — NICOTINE POLACRILEX 4 MG: 4 GUM, CHEWING ORAL at 15:04

## 2018-01-01 RX ADMIN — Medication: at 18:10

## 2018-01-01 RX ADMIN — QUETIAPINE FUMARATE 200 MG: 200 TABLET ORAL at 08:41

## 2018-01-01 RX ADMIN — OLANZAPINE 7.5 MG: 5 TABLET, ORALLY DISINTEGRATING ORAL at 08:41

## 2018-01-01 RX ADMIN — GABAPENTIN 300 MG: 300 CAPSULE ORAL at 08:42

## 2018-01-01 RX ADMIN — GABAPENTIN 300 MG: 300 CAPSULE ORAL at 18:59

## 2018-01-01 RX ADMIN — CARBAMAZEPINE 400 MG: 200 TABLET ORAL at 18:59

## 2018-01-01 RX ADMIN — OLANZAPINE 10 MG: 10 TABLET, FILM COATED ORAL at 21:24

## 2018-01-01 RX ADMIN — NICOTINE POLACRILEX 4 MG: 4 GUM, CHEWING ORAL at 05:39

## 2018-01-01 RX ADMIN — ACETAMINOPHEN 650 MG: 325 TABLET, FILM COATED ORAL at 02:50

## 2018-01-01 NOTE — PROGRESS NOTES
Pt had to be given Zyprexa earlier as she was accusing staff and other patients of calling her " retarded fool"

## 2018-01-01 NOTE — PROGRESS NOTES
Pt denies all s/s  Her speech is pressured and rambling at times, and she is labile and disorganized  This morning she was talking about how she almost became a veternarian and about how sharks and dolphins sense blood in the water  There has been no sexually inappropriate behavior

## 2018-01-01 NOTE — PROGRESS NOTES
Progress Note - [address] 40 y o  female MRN: [id number]   Unit/Bed#: NW7 759-01 Encounter: [id number]    Behavior over the last 24 hours: unchanged  Roselind Drier remains irritable and labile; still has pressured, rambling speech, still has paranoid and grandiose delusions  Had to be given PRN medications last night due to agitation  Compliant with medications  Attends some groups  Sleep: slept better  Appetite: normal  Medication side effects: No   ROS: reports dry skin, denies any shortness of breath or chest pain    Mental Status Evaluation:    Appearance:  casually dressed   Behavior:  cooperative, bizarre   Speech:  pressured, loud   Mood:  dysphoric, irritable   Affect:  labile   Thought Process:  disorganized, illogical   Associations: tangential associations, flight of ideas   Thought Content:  grandiose and paranoid delusions   Perceptual Disturbances: denies auditory or visual hallucinations when asked, but appears distracted   Risk Potential: Suicidal ideation - None  Homicidal ideation - None  Potential for aggression - Yes, due to poor impulse control   Sensorium:  oriented to person, place and time/date   Memory:  recent and remote memory grossly intact   Consciousness:  alert and awake   Attention: poor concentration and poor attention span   Insight:  poor   Judgment: poor   Gait/Station: normal gait/station and normal balance   Motor Activity: no abnormal movements     Vital signs in last 24 hours:    Temp:  [97 8 °F (36 6 °C)-97 9 °F (36 6 °C)] 97 9 °F (36 6 °C)  HR:  [] 98  Resp:  [16] 16  BP: (139-149)/(74-75) 149/74    Laboratory results:  I have personally reviewed all pertinent laboratory/tests results      Progress Toward Goals: no significant progress, still irritable, still labile, still has psychotic symptoms    Assessment/Plan   Principal Problem:    Bipolar I disorder, most recent episode mixed, [facility]  Active Problems: Cannabis abuse    Recommended Treatment:     Planned medication and treatment changes: All current active medications have been reviewed  Encourage group therapy, milieu therapy and occupational therapy  Behavioral Health checks every 15 minutes  On 303 commitment up to 20 days  Recheck Tegretol level in the morning   Change ammonium lactate to scheduled dose due to complaints of dry skin    Continue current medications:      ammonium lactate  Topical BID   carBAMazepine 200 mg Oral Daily   carBAMazepine 400 mg Oral After Dinner   docusate sodium 100 mg Oral Daily   gabapentin 300 mg Oral BID   nicotine 21 mg Transdermal Daily   OLANZapine 7 5 mg Oral Daily   OLANZapine 10 mg Oral HS   QUEtiapine 200 mg Oral QAM   QUEtiapine 400 mg Oral HS       Risks / Benefits of Treatment:    Risks, benefits, and possible side effects of medications explained to patient  Patient has limited understanding of risks and benefits of treatment at this time, but agrees to take medications as prescribed  Risks of medications in pregnancy explained if female patient  Patient verbalizes understanding and agrees to notify her doctor if she becomes pregnant  Counseling / Coordination of Care:    Patient's progress reviewed with nursing staff  Medications, treatment progress and treatment plan reviewed with patient

## 2018-01-02 LAB — CARBAMAZEPINE SERPL-MCNC: 9.7 UG/ML (ref 4–12)

## 2018-01-02 PROCEDURE — 80156 ASSAY CARBAMAZEPINE TOTAL: CPT | Performed by: PSYCHIATRY & NEUROLOGY

## 2018-01-02 RX ORDER — OLANZAPINE 5 MG/1
5 TABLET, ORALLY DISINTEGRATING ORAL DAILY
Status: DISCONTINUED | OUTPATIENT
Start: 2018-01-03 | End: 2018-01-03

## 2018-01-02 RX ORDER — OLANZAPINE 7.5 MG/1
15 TABLET ORAL
Status: DISCONTINUED | OUTPATIENT
Start: 2018-01-02 | End: 2018-01-03

## 2018-01-02 RX ADMIN — GABAPENTIN 300 MG: 300 CAPSULE ORAL at 18:45

## 2018-01-02 RX ADMIN — NICOTINE POLACRILEX 4 MG: 4 GUM, CHEWING ORAL at 08:15

## 2018-01-02 RX ADMIN — NICOTINE POLACRILEX 4 MG: 4 GUM, CHEWING ORAL at 19:36

## 2018-01-02 RX ADMIN — NICOTINE POLACRILEX 4 MG: 4 GUM, CHEWING ORAL at 13:53

## 2018-01-02 RX ADMIN — Medication: at 20:09

## 2018-01-02 RX ADMIN — GABAPENTIN 300 MG: 300 CAPSULE ORAL at 08:13

## 2018-01-02 RX ADMIN — DOCUSATE SODIUM 100 MG: 100 CAPSULE, LIQUID FILLED ORAL at 08:13

## 2018-01-02 RX ADMIN — HYDROXYZINE HYDROCHLORIDE 50 MG: 50 TABLET, FILM COATED ORAL at 13:53

## 2018-01-02 RX ADMIN — CARBAMAZEPINE 400 MG: 200 TABLET ORAL at 18:45

## 2018-01-02 RX ADMIN — OLANZAPINE 15 MG: 7.5 TABLET, FILM COATED ORAL at 21:39

## 2018-01-02 RX ADMIN — OLANZAPINE 7.5 MG: 5 TABLET, ORALLY DISINTEGRATING ORAL at 08:12

## 2018-01-02 RX ADMIN — Medication: at 08:12

## 2018-01-02 RX ADMIN — CARBAMAZEPINE 200 MG: 200 TABLET ORAL at 08:13

## 2018-01-02 RX ADMIN — QUETIAPINE FUMARATE 200 MG: 200 TABLET ORAL at 08:12

## 2018-01-02 RX ADMIN — ACETAMINOPHEN 975 MG: 325 TABLET, FILM COATED ORAL at 04:26

## 2018-01-02 RX ADMIN — FERRIC OXIDE RED 17.7 APPLICATION: 8; 8 LOTION TOPICAL at 04:59

## 2018-01-02 RX ADMIN — LORAZEPAM 1 MG: 1 TABLET ORAL at 19:36

## 2018-01-02 RX ADMIN — QUETIAPINE FUMARATE 400 MG: 200 TABLET ORAL at 21:38

## 2018-01-02 RX ADMIN — NICOTINE POLACRILEX 4 MG: 4 GUM, CHEWING ORAL at 21:41
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
## 2018-01-02 NOTE — PROGRESS NOTES
Progress Note - [address] [facility] 40 y o  female MRN: @MRN   Unit/Bed#: CJ4 759-01 Encounter: [id number]        The patient was seen for continuing care and reviewed with staff  Report from staff regarding this patient received and discussed, and records reviewed prior to seeing this patient   Pt is not angry or agitated, expressing herself in more calm manner, with disorganized pattern of thoughts remains  Has residual what the active paranoid ideations about her mother, but denied any thoughts,   that her mother wants  kill the patient  Slept at night better  The weekend was uneventful  The patient participated in some groups  Carbamazepine level was 9 7 with, therapeutic  No side effects  Patient remains organized and paranoid today  Medication side effects: tiredness  ROS: no complaints, some very mild chest rash, resolving       Mental Status Evaluation:    Appearance:  dressed appropriately, casually dressed   Behavior:  cooperative, calm   Mood:  improved, anxious   Affect: constricted    Speech:  normal pitch   Language: appropriate   Thought Process:  circumstantial and concrete   Associations: concrete associations   Thought Content:  Residual paranoid ideations still persist   Perceptual Disturbances: no auditory hallucinations, no visual hallucinations, does not appear responding to internal stimuli   Risk Potential: Suicidal ideation - None  Homicidal ideation - None  Potential for aggression - No   Sensorium:  oriented to person, place and time   Memory:  recent and remote memory grossly intact   Consciousness:  alert and awake   Attention: attention span and concentration are normal   Intellect: not examined   Fund of Knowledge: awareness of current events appropriate   Insight:  improving and impaired   Judgment: improving and impaired   Muscle Tone: normal   Gait/Station: normal gait/station and normal balance   Motor Activity: no abnormal movements Laboratory results:  I have personally reviewed all pertinent laboratory results  Progress Toward Goals: improving slowly    Assessment/Plan   Principal Problem:    Bipolar I disorder, most recent episode mixed, severe with psychotic features ([address] )  Active Problems:    Cannabis abuse      Recommended Treatment: increase nighttime Zyprexa to 15 mg and decrease daytime to 5 mg to continue treat her psychosis was paranoid ideations and mood instability now improving, and at the same time to help the patient to reduce daytime tiredness  The patient's sleep was improved but still had a restless  Recent labs were reviewed  Planned medication and treatment changes: All current active medications have been reviewed  Continue treatment with group therapy, milieu therapy, occupational therapy and medication management  Risks / Benefits of Treatment:    Risks, benefits, and possible side effects of medications explained to patient including risk of liver impairment and agranulocytosis related to treatment with Tegretol and risk of parkinsonian symptoms, Tardive Dyskinesia and metabolic syndrome related to treatment with antipsychotic medications  The patient verbalizes understanding and agreement for treatment  Counseling / Coordination of Care:    Patient's progress discussed with staff in treatment team meeting  Medication changes reviewed with staff in treatment team meeting  ** Please Note: This note has been constructed using a voice recognition system   **

## 2018-01-02 NOTE — PROGRESS NOTES
Pt has been awake since 04:30  Pt had come out for a cough drop and Tylenol and has been awake ever since

## 2018-01-02 NOTE — PROGRESS NOTES
Patient denies all symptoms and said to me first thing, "I am being discharged  I need to get out of here and move forward with my life " I asked patient why she thinks she is going home because that was not passed onto me   Patient stated, "The doctor said I was going home today "

## 2018-01-03 RX ORDER — OLANZAPINE 10 MG/1
20 TABLET, ORALLY DISINTEGRATING ORAL
Status: DISCONTINUED | OUTPATIENT
Start: 2018-01-03 | End: 2018-01-08

## 2018-01-03 RX ADMIN — OLANZAPINE 5 MG: 5 TABLET, ORALLY DISINTEGRATING ORAL at 08:13

## 2018-01-03 RX ADMIN — NICOTINE POLACRILEX 4 MG: 4 GUM, CHEWING ORAL at 08:13

## 2018-01-03 RX ADMIN — NICOTINE POLACRILEX 4 MG: 4 GUM, CHEWING ORAL at 16:01

## 2018-01-03 RX ADMIN — NICOTINE POLACRILEX 4 MG: 4 GUM, CHEWING ORAL at 16:00

## 2018-01-03 RX ADMIN — Medication 1 APPLICATION: at 08:10

## 2018-01-03 RX ADMIN — DOCUSATE SODIUM 100 MG: 100 CAPSULE, LIQUID FILLED ORAL at 08:09

## 2018-01-03 RX ADMIN — LORAZEPAM 1 MG: 1 TABLET ORAL at 12:25

## 2018-01-03 RX ADMIN — CARBAMAZEPINE 200 MG: 200 TABLET ORAL at 08:09

## 2018-01-03 RX ADMIN — OLANZAPINE 20 MG: 10 TABLET, ORALLY DISINTEGRATING ORAL at 21:46

## 2018-01-03 RX ADMIN — QUETIAPINE FUMARATE 400 MG: 200 TABLET ORAL at 21:46

## 2018-01-03 RX ADMIN — GABAPENTIN 300 MG: 300 CAPSULE ORAL at 08:10

## 2018-01-03 RX ADMIN — BACITRACIN, NEOMYCIN, POLYMYXIN B 1 SMALL APPLICATION: 400; 3.5; 5 OINTMENT TOPICAL at 08:08

## 2018-01-03 RX ADMIN — CARBAMAZEPINE 400 MG: 200 TABLET ORAL at 18:19

## 2018-01-03 RX ADMIN — NICOTINE POLACRILEX 4 MG: 4 GUM, CHEWING ORAL at 12:25

## 2018-01-03 RX ADMIN — QUETIAPINE FUMARATE 200 MG: 200 TABLET ORAL at 08:09

## 2018-01-03 RX ADMIN — GABAPENTIN 300 MG: 300 CAPSULE ORAL at 18:19

## 2018-01-03 NOTE — PROGRESS NOTES
Irritable, angry, argumentative  Insists she was told she was being discharged today  "First they told me yesterday then they told me today " Reality orientation and told she was not told she was leaving either today or tomorrow

## 2018-01-03 NOTE — PROGRESS NOTES
Ativan 1mg po prn given at patient request this evening"I need something for anxiety" - she was having numerous phone calls - talking about her "so called mother"  "she's psychotic, she couldn't be my mother""she's on the phone threatening and talking about putting bugs in my plate " Disorganized, bizarre  Good effect from Ativan prn  Zyprexa dose increased at HS
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
## 2018-01-03 NOTE — PLAN OF CARE
Problem: PSYCHOSIS  Goal: Will report no hallucinations or delusions  Interventions:  - Administer medication as  ordered  - Every waking shifts and PRN assess for the presence of hallucinations and or delusions  - Assist with reality testing to support increasing orientation  - Assess if patient's hallucinations or delusions are encouraging self-harm or harm to others and intervene as appropriate   Outcome: Progressing      Problem: INVOLUNTARY ADMIT  Goal: Will cooperate with staff recommendations and doctor's orders and will demonstrate appropriate behavior  INTERVENTIONS:  - Treat underlying conditions and offer medication as ordered  - Educate regarding involuntary admission procedures and rules  - Utilize positive consistent limit setting strategies to support patient and staff safety   Outcome: Progressing
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
## 2018-01-03 NOTE — PROGRESS NOTES
Pt denies all S&S but remains with flight of ideas and disorganized  Bright affect, no labile behaviors noted  She is OOB and visible in the milieu  Pt mentions waking up  Early to get her daughter to school  She denies any issues and concerns  Remains med compliance

## 2018-01-04 RX ADMIN — CARBAMAZEPINE 200 MG: 200 TABLET ORAL at 09:56

## 2018-01-04 RX ADMIN — DOCUSATE SODIUM 100 MG: 100 CAPSULE, LIQUID FILLED ORAL at 09:56

## 2018-01-04 RX ADMIN — Medication 1 APPLICATION: at 18:27

## 2018-01-04 RX ADMIN — QUETIAPINE FUMARATE 200 MG: 200 TABLET ORAL at 09:56

## 2018-01-04 RX ADMIN — QUETIAPINE FUMARATE 400 MG: 200 TABLET ORAL at 21:07

## 2018-01-04 RX ADMIN — GABAPENTIN 300 MG: 300 CAPSULE ORAL at 09:56

## 2018-01-04 RX ADMIN — BACITRACIN, NEOMYCIN, POLYMYXIN B 1 SMALL APPLICATION: 400; 3.5; 5 OINTMENT TOPICAL at 10:39

## 2018-01-04 RX ADMIN — NICOTINE POLACRILEX 4 MG: 4 GUM, CHEWING ORAL at 10:00

## 2018-01-04 RX ADMIN — LORAZEPAM 1 MG: 1 TABLET ORAL at 15:53

## 2018-01-04 RX ADMIN — GABAPENTIN 300 MG: 300 CAPSULE ORAL at 18:25

## 2018-01-04 RX ADMIN — NICOTINE POLACRILEX 4 MG: 4 GUM, CHEWING ORAL at 12:12

## 2018-01-04 RX ADMIN — OLANZAPINE 20 MG: 10 TABLET, ORALLY DISINTEGRATING ORAL at 21:07

## 2018-01-04 RX ADMIN — NICOTINE POLACRILEX 4 MG: 4 GUM, CHEWING ORAL at 15:53

## 2018-01-04 RX ADMIN — CARBAMAZEPINE 400 MG: 200 TABLET ORAL at 18:25

## 2018-01-04 RX ADMIN — Medication: at 09:57

## 2018-01-04 RX ADMIN — FERRIC OXIDE RED 17.7 APPLICATION: 8; 8 LOTION TOPICAL at 18:27
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
## 2018-01-04 NOTE — PROGRESS NOTES
Pt denies SI and presents with flight of ideas and presented to the nursing station upset, labile and cursing at her sister calling her demeaning names because her sister did not bring her clothes  Pt herself asked for medication to help her with anxiety  Ativan PRN was given  Pt talks about her children and her parents watching them  Will monitor
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
## 2018-01-04 NOTE — CASE MANAGEMENT
SW met with pt  Seemed calmer  Did not berate her mother when talking about her  Hopeful for D/C soon  SW said she'd have to confirm D/C with her mother  Pt signed BERE for mother, Bekah Virk, [phone]  SW asked about BF  SW said that he picked pt up last time @ D/C  Pt signed BERE for BF, Uma Chapa, [phone]  Still tangential at times  Said she was a , but then said she'd like to do hair  Talked about buying an A-frame house that is vacant by her sister's house  Calm  No angry statements  Willing to return home with parents & her kids  Agreed to follow-up with out pt zoila Leblanc

## 2018-01-04 NOTE — PROGRESS NOTES
Pt was awake multiple times tonight for brief periods  She spent at least two hours in the patient's lounge sleeping in a chair

## 2018-01-05 RX ADMIN — QUETIAPINE FUMARATE 200 MG: 200 TABLET ORAL at 08:15

## 2018-01-05 RX ADMIN — ACETAMINOPHEN 975 MG: 325 TABLET, FILM COATED ORAL at 23:52

## 2018-01-05 RX ADMIN — Medication: at 09:39

## 2018-01-05 RX ADMIN — GABAPENTIN 300 MG: 300 CAPSULE ORAL at 18:23

## 2018-01-05 RX ADMIN — OLANZAPINE 20 MG: 10 TABLET, ORALLY DISINTEGRATING ORAL at 21:42

## 2018-01-05 RX ADMIN — NICOTINE POLACRILEX 4 MG: 4 GUM, CHEWING ORAL at 20:19

## 2018-01-05 RX ADMIN — NICOTINE POLACRILEX 4 MG: 4 GUM, CHEWING ORAL at 12:57

## 2018-01-05 RX ADMIN — CARBAMAZEPINE 400 MG: 200 TABLET ORAL at 18:23

## 2018-01-05 RX ADMIN — GABAPENTIN 300 MG: 300 CAPSULE ORAL at 08:15

## 2018-01-05 RX ADMIN — CARBAMAZEPINE 200 MG: 200 TABLET ORAL at 08:15

## 2018-01-05 RX ADMIN — QUETIAPINE FUMARATE 400 MG: 200 TABLET ORAL at 21:42

## 2018-01-05 RX ADMIN — NICOTINE POLACRILEX 4 MG: 4 GUM, CHEWING ORAL at 06:25

## 2018-01-05 RX ADMIN — DOCUSATE SODIUM 100 MG: 100 CAPSULE, LIQUID FILLED ORAL at 08:15

## 2018-01-05 RX ADMIN — NICOTINE POLACRILEX 4 MG: 4 GUM, CHEWING ORAL at 09:39

## 2018-01-05 NOTE — PROGRESS NOTES
Progress Note - 2[address] [facility] 40 y o  female MRN: @MRN   Unit/Bed#: DD5 759-01 Encounter: [id number]        The patient was seen for continuing care and reviewed with staff  Report from staff regarding this patient received and discussed, and records reviewed prior to seeing this patient   Mood is stable  Patient still tangential  No aggravated  Nicotine patch helps nicotine craving  Writing poetry  Adherent to unit rules, communicate with selected peers, no longer agitated or angry or irritable and no overt symptoms of psychosis noted today  Patient remains anxious, appropriate, better, cooperative with milieu, cooperative with staff and hypertalkative today  Medication side effects: No   ROS: no complaints    Mental Status Evaluation:    Appearance:  dressed appropriately, casually dressed   Behavior:  cooperative, calm   Mood:  anxious   Affect: reactive, brighter, less labile, less constricted    Speech:  normal rate and volume, hypertalkative   Language: appropriate   Thought Process:  circumstantial   Associations: concrete associations   Thought Content:  paranoid ideation   Perceptual Disturbances: no auditory hallucinations, no visual hallucinations, does not appear responding to internal stimuli   Risk Potential: Suicidal ideation - None  Homicidal ideation - None  Potential for aggression - No   Sensorium:  oriented to person, place and time   Memory:  recent and remote memory grossly intact   Consciousness:  alert and awake   Attention: attention span and concentration are normal   Intellect: normal   Fund of Knowledge: awareness of current events appropriate   Insight:  improving   Judgment: improving   Muscle Tone: normal   Gait/Station: normal gait/station and normal balance   Motor Activity: no abnormal movements         Laboratory results:  I have personally reviewed all pertinent laboratory results        Progress Toward Goals: improving gradually    Assessment/Plan Principal Problem:    Bipolar I disorder, most recent episode mixed, severe with psychotic features ([address] )  Active Problems:    Cannabis abuse      Recommended Treatment:  Patient condition is improved on a combination of Zyprexa 20 mg of Seroquel 200 in morning and 400 at bedtime  A combination of 2 different antipsychotics is necessary because of the patient did not improve on several monotherapy trials and significantly improved on this combination  Will continue the rest of her medication, her mood is improved and her insight is improving as well  Sleep was good, appetite is improved and normal     Planned medication and treatment changes: All current active medications have been reviewed  Continue treatment with group therapy, milieu therapy, occupational therapy and medication management  Risks / Benefits of Treatment:    Risks, benefits, and possible side effects of medications explained to patient including risk of parkinsonian symptoms, Tardive Dyskinesia and metabolic syndrome related to treatment with antipsychotic medications  The patient verbalizes understanding and agreement for treatment  Counseling / Coordination of Care:    Patient's progress discussed with staff in treatment team meeting  Medication changes reviewed with staff in treatment team meeting  ** Please Note: This note has been constructed using a voice recognition system   **

## 2018-01-05 NOTE — CASE MANAGEMENT
When CATERINA caled pt's mother on 1/4, phone message said that all circuits were busy  SW called today, [phone]  Pt's daughter, Livier Thomas, answered  She said pt's mother was not there  They share the phone  SW left message asking that pt's mother call her  SW called BF, Yunior Valdez, [phone]  He felt pt was not doing well  Seems since pt stopped taking Abilify, she has not been doing well  He feelspt is not stable  Said pt was talking about hanging out with Snoop dog & Tupac  Said he'd like to talk to pt's dr if possible  Pt seems to use their son against him when things not gonig her way  Son currently lives with pt's parents  Said he'd try to visit pt this wkend  Said he could pick pt up @ D/C  CATERINA said she'd advise dr of his message to talk with him  CATERINA called [email or web address] [place]  Scheduled appts on 1/11 @ 5:30 PM with Rosa Kelly, therapist, & on 1/13 @ 11:30 AM with Dr Jason Padilla

## 2018-01-05 NOTE — PROGRESS NOTES
Pt is out in the milieu  Speech is pressured  Pt denies all symptoms and talks about what she will be doing with her children when she gets home  States that she is going to apologize to her stepmother  Pt is cooperative with medications and attends groups  No delusional material noted

## 2018-01-06 RX ADMIN — LORAZEPAM 1 MG: 1 TABLET ORAL at 15:32

## 2018-01-06 RX ADMIN — DOCUSATE SODIUM 100 MG: 100 CAPSULE, LIQUID FILLED ORAL at 09:07

## 2018-01-06 RX ADMIN — GABAPENTIN 300 MG: 300 CAPSULE ORAL at 18:05

## 2018-01-06 RX ADMIN — CARBAMAZEPINE 400 MG: 200 TABLET ORAL at 18:05

## 2018-01-06 RX ADMIN — OLANZAPINE 20 MG: 10 TABLET, ORALLY DISINTEGRATING ORAL at 21:35

## 2018-01-06 RX ADMIN — QUETIAPINE FUMARATE 200 MG: 200 TABLET ORAL at 09:07

## 2018-01-06 RX ADMIN — GABAPENTIN 300 MG: 300 CAPSULE ORAL at 09:07

## 2018-01-06 RX ADMIN — Medication 1 APPLICATION: at 18:07

## 2018-01-06 RX ADMIN — CARBAMAZEPINE 200 MG: 200 TABLET ORAL at 09:07

## 2018-01-06 RX ADMIN — QUETIAPINE FUMARATE 400 MG: 200 TABLET ORAL at 21:35

## 2018-01-06 RX ADMIN — Medication: at 09:12

## 2018-01-06 RX ADMIN — FERRIC OXIDE RED 17.7 APPLICATION: 8; 8 LOTION TOPICAL at 05:14

## 2018-01-06 RX ADMIN — NICOTINE POLACRILEX 4 MG: 4 GUM, CHEWING ORAL at 15:31

## 2018-01-06 NOTE — PROGRESS NOTES
At the start of the shift patient complaining of leg pain and received Tylenol  975 mg  She also stated that her stomach was upset and received gingerale and crackers  She is now sleeping

## 2018-01-06 NOTE — PROGRESS NOTES
Patient has taken medication cooperatively  Guarded, "I'm leaving Monday "  Showered and interested in ADL's  Peripheral in the milieu
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
## 2018-01-06 NOTE — PROGRESS NOTES
Patient requesting "something for anxiety " She said she is having a hard time waiting for Monday(expecting d/c) and a hard time waiting for her boyfriend to come see her tomorrow

## 2018-01-06 NOTE — PROGRESS NOTES
Patient out in the dayroom very social and pleasant   Cooperative , taking medications as ordered   States that she is going home Monday   No si or hi    Not irritable this am

## 2018-01-07 RX ADMIN — QUETIAPINE FUMARATE 200 MG: 200 TABLET ORAL at 08:49

## 2018-01-07 RX ADMIN — CARBAMAZEPINE 400 MG: 200 TABLET ORAL at 18:09

## 2018-01-07 RX ADMIN — OLANZAPINE 20 MG: 10 TABLET, ORALLY DISINTEGRATING ORAL at 21:20

## 2018-01-07 RX ADMIN — Medication: at 08:51

## 2018-01-07 RX ADMIN — NICOTINE POLACRILEX 4 MG: 4 GUM, CHEWING ORAL at 11:46

## 2018-01-07 RX ADMIN — NICOTINE POLACRILEX 4 MG: 4 GUM, CHEWING ORAL at 21:24

## 2018-01-07 RX ADMIN — GABAPENTIN 300 MG: 300 CAPSULE ORAL at 08:49

## 2018-01-07 RX ADMIN — Medication: at 21:24

## 2018-01-07 RX ADMIN — QUETIAPINE FUMARATE 400 MG: 200 TABLET ORAL at 21:20

## 2018-01-07 RX ADMIN — GABAPENTIN 300 MG: 300 CAPSULE ORAL at 18:09

## 2018-01-07 RX ADMIN — DOCUSATE SODIUM 100 MG: 100 CAPSULE, LIQUID FILLED ORAL at 08:50

## 2018-01-07 RX ADMIN — CARBAMAZEPINE 200 MG: 200 TABLET ORAL at 08:50

## 2018-01-07 RX ADMIN — NICOTINE POLACRILEX 4 MG: 4 GUM, CHEWING ORAL at 18:14

## 2018-01-07 RX ADMIN — NICOTINE POLACRILEX 4 MG: 4 GUM, CHEWING ORAL at 08:51

## 2018-01-07 NOTE — PROGRESS NOTES
Out for breakfast and states she is getting a visit by her BF today   She says she feels great and that she is anxious to see him   Rodriguez Eliazar  She states she slept well last night

## 2018-01-07 NOTE — PROGRESS NOTES
Progress Note - Behavioral Health   Narciso Palmer 40 y o  female MRN: [id number]  Unit/Bed#: MK4 759-01 Encounter: [id number]    Assessment/Plan   Principal Problem:    Bipolar I disorder, most recent episode mixed, severe with psychotic features ([address] )  Active Problems:    Cannabis abuse      Behavior over the last 24 hours:  improved  Sleep: normal  Appetite: normal  Medication side effects: No  ROS: no complaints    Mental Status Evaluation:  Appearance:  age appropriate and casually dressed   Behavior:  cooperative   Speech:  pressured   Mood:  labile   Affect:  labile   Thought Process:  circumstantial   Thought Content:  normal   Perceptual Disturbances: None   Risk Potential: Suicidal Ideations none, Homicidal Ideations none and Potential for Aggression No   Sensorium:  person, place and time/date   Cognition:  grossly intact   Consciousness:  alert and awake    Attention: attention span and concentration were age appropriate   Insight:  limited   Judgment: limited   Gait/Station: normal gait/station and normal balance   Motor Activity: no abnormal movements     Progress Toward Goals: Patient stated she feels fine and is looking forward to be discharged  She denies medication side effects  She is still pressure and circumstantial     Recommended Treatment: Continue with group therapy, milieu therapy and occupational therapy  Risks, benefits and possible side effects of Medications:   Risks, benefits, and possible side effects of medications explained to patient and patient verbalizes understanding  Medications: all current active meds have been reviewed  Labs: reviewed    Counseling / Coordination of Care  Total floor / unit time spent today n/aminutes  Greater than 50% of total time was spent with the patient and / or family counseling and / or coordination of care   A description of the counseling / coordination of care:
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
## 2018-01-08 RX ORDER — OLANZAPINE 10 MG/1
30 TABLET, ORALLY DISINTEGRATING ORAL
Status: DISCONTINUED | OUTPATIENT
Start: 2018-01-08 | End: 2018-01-09 | Stop reason: HOSPADM

## 2018-01-08 RX ORDER — QUETIAPINE FUMARATE 200 MG/1
200 TABLET, FILM COATED ORAL
Status: DISCONTINUED | OUTPATIENT
Start: 2018-01-08 | End: 2018-01-09 | Stop reason: HOSPADM

## 2018-01-08 RX ADMIN — OLANZAPINE 30 MG: 10 TABLET, ORALLY DISINTEGRATING ORAL at 22:07

## 2018-01-08 RX ADMIN — NICOTINE POLACRILEX 4 MG: 4 GUM, CHEWING ORAL at 07:40

## 2018-01-08 RX ADMIN — ACETAMINOPHEN 650 MG: 325 TABLET, FILM COATED ORAL at 17:04

## 2018-01-08 RX ADMIN — GABAPENTIN 300 MG: 300 CAPSULE ORAL at 17:05

## 2018-01-08 RX ADMIN — QUETIAPINE FUMARATE 200 MG: 200 TABLET ORAL at 22:07

## 2018-01-08 RX ADMIN — NICOTINE POLACRILEX 4 MG: 4 GUM, CHEWING ORAL at 16:08

## 2018-01-08 RX ADMIN — GABAPENTIN 300 MG: 300 CAPSULE ORAL at 08:06

## 2018-01-08 RX ADMIN — DOCUSATE SODIUM 100 MG: 100 CAPSULE, LIQUID FILLED ORAL at 08:05

## 2018-01-08 RX ADMIN — CARBAMAZEPINE 200 MG: 200 TABLET ORAL at 08:06

## 2018-01-08 RX ADMIN — OLANZAPINE 10 MG: 10 TABLET, FILM COATED ORAL at 13:30

## 2018-01-08 RX ADMIN — QUETIAPINE FUMARATE 200 MG: 200 TABLET ORAL at 08:05

## 2018-01-08 RX ADMIN — NICOTINE POLACRILEX 4 MG: 4 GUM, CHEWING ORAL at 12:29

## 2018-01-08 RX ADMIN — FERRIC OXIDE RED 17.7 APPLICATION: 8; 8 LOTION TOPICAL at 07:04

## 2018-01-08 RX ADMIN — CARBAMAZEPINE 400 MG: 200 TABLET ORAL at 17:05

## 2018-01-08 RX ADMIN — Medication: at 08:07

## 2018-01-08 RX ADMIN — Medication: at 18:54

## 2018-01-08 RX ADMIN — NICOTINE POLACRILEX 4 MG: 4 GUM, CHEWING ORAL at 20:47

## 2018-01-08 RX ADMIN — NICOTINE POLACRILEX 4 MG: 4 GUM, CHEWING ORAL at 22:10

## 2018-01-08 RX ADMIN — FERRIC OXIDE RED 17.7 APPLICATION: 8; 8 LOTION TOPICAL at 19:50
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
## 2018-01-08 NOTE — PROGRESS NOTES
Patient has been compliant with medications  No irritability this evening  Expects to be discharged tomorrow
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
## 2018-01-08 NOTE — PLAN OF CARE
Problem: PSYCHOSIS  Goal: Will report no hallucinations or delusions  Interventions:  - Administer medication as  ordered  - Every waking shifts and PRN assess for the presence of hallucinations and or delusions  - Assist with reality testing to support increasing orientation  - Assess if patient's hallucinations or delusions are encouraging self-harm or harm to others and intervene as appropriate   Outcome: Progressing      Problem: DISCHARGE PLANNING  Goal: Discharge to home or other facility with appropriate resources  INTERVENTIONS:  - Identify barriers to discharge w/patient and caregiver  - Arrange for needed discharge resources and transportation as appropriate  - Identify discharge learning needs (meds, wound care, etc )  - Refer to Case Management Department for coordinating discharge planning if the patient needs post-hospital services based on physician/advanced practitioner order or complex needs related to functional status, cognitive ability, or social support system    Outcome: Progressing      Problem: Ineffective Coping  Goal: Participates in unit activities  Interventions:  - Provide therapeutic environment   - Provide required programming   - Redirect inappropriate behaviors     Outcome: Progressing

## 2018-01-08 NOTE — PLAN OF CARE
Problem: PSYCHOSIS  Goal: Will report no hallucinations or delusions  Interventions:  - Administer medication as  ordered  - Every waking shifts and PRN assess for the presence of hallucinations and or delusions  - Assist with reality testing to support increasing orientation  - Assess if patient's hallucinations or delusions are encouraging self-harm or harm to others and intervene as appropriate   Outcome: Progressing      Problem: INVOLUNTARY ADMIT  Goal: Will cooperate with staff recommendations and doctor's orders and will demonstrate appropriate behavior  INTERVENTIONS:  - Treat underlying conditions and offer medication as ordered  - Educate regarding involuntary admission procedures and rules  - Utilize positive consistent limit setting strategies to support patient and staff safety   Outcome: Completed Date Met: 01/08/18      Problem: DISCHARGE PLANNING  Goal: Discharge to home or other facility with appropriate resources  INTERVENTIONS:  - Identify barriers to discharge w/patient and caregiver  - Arrange for needed discharge resources and transportation as appropriate  - Identify discharge learning needs (meds, wound care, etc )  - Refer to Case Management Department for coordinating discharge planning if the patient needs post-hospital services based on physician/advanced practitioner order or complex needs related to functional status, cognitive ability, or social support system    Outcome: Progressing      Problem: Ineffective Coping  Goal: Participates in unit activities  Interventions:  - Provide therapeutic environment   - Provide required programming   - Redirect inappropriate behaviors     Outcome: Progressing

## 2018-01-09 VITALS
WEIGHT: 213.6 LBS | DIASTOLIC BLOOD PRESSURE: 83 MMHG | BODY MASS INDEX: 35.59 KG/M2 | RESPIRATION RATE: 16 BRPM | SYSTOLIC BLOOD PRESSURE: 138 MMHG | HEIGHT: 65 IN | HEART RATE: 115 BPM | TEMPERATURE: 97.4 F

## 2018-01-09 RX ORDER — OLANZAPINE 15 MG/1
30 TABLET ORAL
Qty: 60 TABLET | Refills: 0 | Status: SHIPPED | OUTPATIENT
Start: 2018-01-09 | End: 2018-02-08

## 2018-01-09 RX ORDER — OLANZAPINE 10 MG/1
30 TABLET ORAL
Status: DISCONTINUED | OUTPATIENT
Start: 2018-01-09 | End: 2018-01-09 | Stop reason: HOSPADM

## 2018-01-09 RX ORDER — CARBAMAZEPINE 200 MG/1
400 TABLET ORAL
Qty: 30 TABLET | Refills: 0 | Status: SHIPPED | OUTPATIENT
Start: 2018-01-09

## 2018-01-09 RX ORDER — HYDROXYZINE 50 MG/1
50 TABLET, FILM COATED ORAL 2 TIMES DAILY PRN
Qty: 60 TABLET | Refills: 0 | Status: SHIPPED | OUTPATIENT
Start: 2018-01-09 | End: 2018-03-10

## 2018-01-09 RX ORDER — GABAPENTIN 300 MG/1
300 CAPSULE ORAL 2 TIMES DAILY
Qty: 60 CAPSULE | Refills: 0 | Status: SHIPPED | OUTPATIENT
Start: 2018-01-09 | End: 2018-02-08

## 2018-01-09 RX ORDER — QUETIAPINE FUMARATE 200 MG/1
200 TABLET, FILM COATED ORAL EVERY 12 HOURS
Qty: 60 TABLET | Refills: 0 | Status: SHIPPED | OUTPATIENT
Start: 2018-01-09 | End: 2018-02-08

## 2018-01-09 RX ORDER — DOCUSATE SODIUM 100 MG/1
100 CAPSULE, LIQUID FILLED ORAL DAILY
Qty: 10 CAPSULE | Refills: 0 | Status: SHIPPED | OUTPATIENT
Start: 2018-01-10 | End: 2018-01-20

## 2018-01-09 RX ORDER — CARBAMAZEPINE 200 MG/1
200 TABLET ORAL DAILY
Qty: 30 TABLET | Refills: 0 | Status: SHIPPED | OUTPATIENT
Start: 2018-01-10 | End: 2018-02-09

## 2018-01-09 RX ADMIN — DOCUSATE SODIUM 100 MG: 100 CAPSULE, LIQUID FILLED ORAL at 08:59

## 2018-01-09 RX ADMIN — NICOTINE POLACRILEX 4 MG: 4 GUM, CHEWING ORAL at 08:08

## 2018-01-09 RX ADMIN — GABAPENTIN 300 MG: 300 CAPSULE ORAL at 08:59

## 2018-01-09 RX ADMIN — NICOTINE POLACRILEX 4 MG: 4 GUM, CHEWING ORAL at 06:31

## 2018-01-09 RX ADMIN — CARBAMAZEPINE 200 MG: 200 TABLET ORAL at 08:59

## 2018-01-09 RX ADMIN — NICOTINE POLACRILEX 4 MG: 4 GUM, CHEWING ORAL at 13:46

## 2018-01-09 RX ADMIN — NICOTINE POLACRILEX 4 MG: 4 GUM, CHEWING ORAL at 09:59

## 2018-01-09 RX ADMIN — FERRIC OXIDE RED 17.7 APPLICATION: 8; 8 LOTION TOPICAL at 06:31

## 2018-01-09 RX ADMIN — QUETIAPINE FUMARATE 200 MG: 200 TABLET ORAL at 08:59

## 2018-01-09 NOTE — DISCHARGE INSTR - LAB
Contact Information: If you have any questions, concerns, pended studies, tests and/or procedures, or emergencies regarding your inpatient behavioral health visit  Please contact [place] [facility] [phone] and ask to speak to a , nurse or physician  A contact is available 24 hours/ 7 days a week at this number  Summary of Procedures Performed During your Stay:  Below is a list of major procedures performed during your hospital stay and a summary of results:  - No major procedures performed  Pending Studies     None        If studies are pending at discharge, follow up with your PCP and/or referring provider

## 2018-01-09 NOTE — PROGRESS NOTES
Progress Note - [facility] 40 y o  female MRN: @MRN   Unit/Bed#: AZ4 351-33 Encounter: [id number]        The patient was seen for continuing care and reviewed with staff  Report from staff regarding this patient received and discussed, and records reviewed prior to seeing this patient   The patient was upset this morning after she learned that she will not go home today after being tearful and angry toward her boyfriend and was irritable and upset with tears in her eyes  We discussed her medication management and adjustment was increase of Zyprexa and decrease of Seroquel  Later on the patient became polite, calm, and accepted the need to medication management  She will be discharged to [facility] if improvement will be evident  Patient remains anxious and argumentative today      Medication side effects: No   ROS: no complaints    Mental Status Evaluation:    Appearance:  dressed appropriately, casually dressed   Behavior:  demanding   Mood:  dysphoric, anxious   Affect: constricted    Speech:  hypertalkative   Language: appropriate   Thought Process:  concrete   Associations: concrete associations   Thought Content:  normal, negative thinking   Perceptual Disturbances: no auditory hallucinations, no visual hallucinations, denies auditory hallucinations when asked, does not appear responding to internal stimuli   Risk Potential: Suicidal ideation - None  Homicidal ideation - None  Potential for aggression - No   Sensorium:  oriented to person, place and time   Memory:  recent and remote memory grossly intact   Consciousness:  alert and awake   Attention: attention span and concentration are normal   Intellect: not examined   Fund of Knowledge: awareness of current events appropriate   Insight:  improving   Judgment: improved   Muscle Tone: normal   Gait/Station: normal gait/station and normal balance   Motor Activity: no abnormal movements         Laboratory results:  I have personally reviewed all pertinent laboratory results  Progress Toward Goals: improving    Assessment/Plan   Principal Problem:    Bipolar I disorder, most recent episode mixed, severe with psychotic features ([address] )  Active Problems:    Cannabis abuse      Recommended Treatment:  Will increase Zyprexa to 30 mg and decrease Seroquel to 200 mg twice a day  The the patient despite some regression in her emotional stability later was more calm accepting the fact that she needs some medication adjustment before here discharged tomorrow  Was kind, polite, mellow and had calm mood and polite and pleasant conversation later today  Planned medication and treatment changes: All current active medications have been reviewed  Continue treatment with group therapy, milieu therapy, occupational therapy and medication management  Risks / Benefits of Treatment:    Risks, benefits, and possible side effects of medications explained to patient and patient verbalizes understanding and agreement for treatment  Counseling / Coordination of Care:    Patient's progress discussed with staff in treatment team meeting  Medication changes reviewed with staff in treatment team meeting  Supportive therapy provided to patient  Coping skills reviewed with patient  ** Please Note: This note has been constructed using a voice recognition system   **

## 2018-01-09 NOTE — PLAN OF CARE
Problem: PSYCHOSIS  Goal: Will report no hallucinations or delusions  Interventions:  - Administer medication as  ordered  - Every waking shifts and PRN assess for the presence of hallucinations and or delusions  - Assist with reality testing to support increasing orientation  - Assess if patient's hallucinations or delusions are encouraging self-harm or harm to others and intervene as appropriate   Outcome: Completed Date Met: 01/09/18      Problem: DISCHARGE PLANNING  Goal: Discharge to home or other facility with appropriate resources  INTERVENTIONS:  - Identify barriers to discharge w/patient and caregiver  - Arrange for needed discharge resources and transportation as appropriate  - Identify discharge learning needs (meds, wound care, etc )  - Refer to Case Management Department for coordinating discharge planning if the patient needs post-hospital services based on physician/advanced practitioner order or complex needs related to functional status, cognitive ability, or social support system    Outcome: Completed Date Met: 01/09/18      Problem: Ineffective Coping  Goal: Participates in unit activities  Interventions:  - Provide therapeutic environment   - Provide required programming   - Redirect inappropriate behaviors     Outcome: Completed Date Met: 01/09/18

## 2018-01-09 NOTE — CASE MANAGEMENT
SW met with pt prior to D/C  Khushi bright, Pt reported having a good night last night  SW advised pt that she spoke to her BF, Latonya Galvan  Said he'd pick her up today @ noon  Pt said she'd take her meds after D/C  Said she'd get scripts filled after leaving today  Said she'd ask BF to take her to pharmacy  Pleasant  Appropriate  Good eye contact  Has appts @ [facility] on 01/11 @ 5:30 PM with Jesenia Vanegas, therapist, & on 01/13 @ 11:30 AM with Dr Nasim Alamo  Pt was helping to take care of plants in the AT room

## 2018-01-09 NOTE — PROGRESS NOTES
Pt denies all s/s  She's bright and pleasant  She is happy to be for d/c today  States once she leaves the first thing she is going to do is fill her medications because she knows she needs to take them

## 2018-01-09 NOTE — NURSING NOTE
AVS reviewed with patient  Patient discharged with belongings, instructions, and prescriptions in the company of her boyfriend

## 2018-01-10 NOTE — PROGRESS NOTES
Family member and friend have been calling the unit and report she did not come home today  Message left for boyfriend Tasha Washington on his phone to have him call the floor  He has returned our call and reports he had to go to work so he dropped her off at [employer]  He thinks she may have gone shopping but he is going to try to reach her via Facebook  A message was left on her cell phone # also to let her know that her family had contacted us and they were worried because she had not arrived home  Tasha Washington was made aware that we had reached out to Yecenia Dewey in this way
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
## 2018-03-23 NOTE — PROGRESS NOTES
Pt is out in the milieu and is interacting with peers and attending groups  Pt's speech is pressured and pt thinks that she is going to be discharged today  Pt denies all symptoms  5

## 2019-06-10 NOTE — PROGRESS NOTES
Dr Dashawn Castro from Trauma came and saw pt  Orders received  Hygiene care completed  Pt assisted back to bed  (4) walks frequently

## 2019-10-09 NOTE — TERTIARY TRAUMA SURVEY
Progress Note - Tertiary Trauma Sur[place]   Monae Daley 40 y o  female MRN: [id number]  Unit/Bed#: KZ0 087-29 Encounter: [id number]    Summary of Diagnosed Injuries:   Fall with headache     Clinical Plan:   - CT vrad read: negative    - GCS 15 with no complaints  - fall precautions and bed alarm  - trauma sign off, please call if any concerns     Mechanism of Injury: Fall      Outside Films Received: not applicable  Tertiary Exam Due on: 11/25/2017     Vitals: Blood pressure 165/93, pulse (!) 112, temperature 97 8 °F (36 6 °C), temperature source Oral, resp  rate 16, height 5' 5" (1 651 m), weight 95 3 kg (210 lb), SpO2 98 %  ,Body mass index is 34 95 kg/m²  CT / RADIOGRAPHS: ALL RESULTS MUST BE CONFIRMED BY FACULTY OR PRINTED REPORT    CT HEAD: No acute intracranial pathology  CT CHEST:    CT FACE:  CT ABDOMEN / PELVIS:   CT CERVICAL SPINE:  XR PELVIS:    CT THORACIC / LUMBAR SPINE:  CXR CHEST:    OTHER: OTHER:    OTHER:  OTHER:    OTHER: OTHER:    OTHER:  OTHER:    OTHER:  OTHER:      Consultants - List Service/ Faculty and Date:   Trauma    Active medications:           Current Facility-Administered Medications:     acetaminophen (TYLENOL) tablet 650 mg, 650 mg, Oral, Q6H PRN    aluminum-magnesium hydroxide-simethicone (MYLANTA) 200-200-20 mg/5 mL oral suspension 30 mL, 30 mL, Oral, Q4H PRN    benztropine (COGENTIN) injection 1 mg, 1 mg, Intramuscular, Q1H PRN    haloperidol (HALDOL) tablet 5 mg, 5 mg, Oral, Q6H PRN    haloperidol lactate (HALDOL) injection 5 mg, 5 mg, Intramuscular, Q6H PRN    magnesium hydroxide (MILK OF MAGNESIA) 400 mg/5 mL oral suspension 30 mL, 30 mL, Oral, Daily PRN    No intake or output data in the 24 hours ending 11/25/17 0852    Invasive Devices          No matching active lines, drains, or airways          CAGE-AID Questionnaire:    Was the patient able to participate in the CAGE-AID screening questions on admission? No:   1   Does the patient consume alcohol? b  YES-Patient does consume alcohol     2  Does the patient use street drugs or abuse prescription drugs? b  YES-Patient does use street drugs or abuse prescription drugs    Did the patient answer YES in one of the questions above? Yes:      3  Has the patient felt he/she ought to cut down on drinking and/or drug use? a  NO    4  Has the patient felt annoyed by others criticizing their drinking and/or drug use? a  NO    5  Has the patient felt bad or guilty about their drinking or drug use? a  NO    6  Has the patient ever had a drink (or used drugs) first thing in the morning to steady their nerves or to get rid of a hangover (eye-opener)? a  NO    If you answered "Yes" to any of the questions above, then order a Consult to Brief Intervention  Is the patient 65 years or older: No    1  GCS:  GCS Total:  15, Eye Opening:   Spontaneous = 4, Motor Response: Obeys commands = 6 and Verbal Response:  Oriented = 5  2  Head:   WNL, a  Inspect and palpate SCALP for:  lac/abrasion:  None and b  Inspect and palpate FACE for:   lac/abrasion:  None  3  Neck:   WNL, a  Inspect for lac/abrasion/hematoma/swelling:  None, b   Palpate for tenderness:  None and e   C-spine cleared clinically:  yes  4  Chest:   WNL and a  Inspect for lac/abrasion/hematoma/swelling:  None  5  Abdomen/Pelvis:   WNL and a  Inspect for:    lac/abrasion:  None  6  Back (log roll with spinal immobilization unless cleared radiographically):   WNL and a  Inspect back of head/entire back for:   lac/abrasion:  None  7  Extremities:   Lacs, abrasions, swelling, ecchymosis: none   Tenderness, pain with motor, instability: none  8  Peripheral Nerves: WNL    Do NOT use the following abbreviations: DTO, gr, Chetan, MS, MSO4, MgSO4, Nitro, QD, QID, QOD, u, , ?, ?g or trailing zeros   Always use a zero before a decimal     Labs:   CBC:   Lab Results   Component Value Date    WBC 10 46 (H) 11/25/2017    HGB 12 3 11/25/2017    HCT 37 6 11/25/2017    MCV 91 11/25/2017     11/25/2017    MCH 29 7 11/25/2017    MCHC 32 7 11/25/2017    RDW 14 1 11/25/2017    MPV 10 7 11/25/2017    NRBC 0 11/25/2017     CMP:   Lab Results   Component Value Date     11/25/2017     11/25/2017    CO2 29 11/25/2017    ANIONGAP 5 11/25/2017    BUN 9 11/25/2017    CREATININE 0 68 11/25/2017    GLUCOSE 99 11/25/2017    CALCIUM 8 4 11/25/2017    AST 13 11/25/2017    ALT 22 11/25/2017    ALKPHOS 59 11/25/2017    PROT 6 0 (L) 11/25/2017    ALBUMIN 3 1 (L) 11/25/2017    BILITOT 0 33 11/25/2017    EGFR 113 11/25/2017 No

## 2020-11-11 NOTE — PLAN OF CARE
Case placed on OR schedule, COVID test ordered. Problem: Ineffective Coping  Goal: Participates in unit activities  Interventions:  - Provide therapeutic environment   - Provide required programming   - Redirect inappropriate behaviors     Outcome: Progressing

## 2021-02-03 ENCOUNTER — HOSPITAL ENCOUNTER (EMERGENCY)
Facility: HOSPITAL | Age: 41
Discharge: HOME | End: 2021-02-03
Attending: EMERGENCY MEDICINE | Admitting: EMERGENCY MEDICINE
Payer: MEDICARE

## 2021-02-03 VITALS
RESPIRATION RATE: 20 BRPM | HEIGHT: 65 IN | BODY MASS INDEX: 36.65 KG/M2 | SYSTOLIC BLOOD PRESSURE: 170 MMHG | WEIGHT: 220 LBS | OXYGEN SATURATION: 96 % | HEART RATE: 70 BPM | DIASTOLIC BLOOD PRESSURE: 92 MMHG | TEMPERATURE: 98.6 F

## 2021-02-03 DIAGNOSIS — M79.672 BILATERAL FOOT PAIN: ICD-10-CM

## 2021-02-03 DIAGNOSIS — M79.671 BILATERAL FOOT PAIN: ICD-10-CM

## 2021-02-03 DIAGNOSIS — L30.9 ECZEMA: ICD-10-CM

## 2021-02-03 DIAGNOSIS — T50.Z95A VACCINE REACTION: ICD-10-CM

## 2021-02-03 DIAGNOSIS — R19.7 DIARRHEA: Primary | ICD-10-CM

## 2021-02-03 LAB
ALBUMIN SERPL BCP-MCNC: 3.5 G/DL (ref 3.5–5)
ALP SERPL-CCNC: 88 U/L (ref 46–116)
ALT SERPL W P-5'-P-CCNC: 31 U/L (ref 12–78)
ANION GAP SERPL CALCULATED.3IONS-SCNC: 7 MMOL/L (ref 4–13)
AST SERPL W P-5'-P-CCNC: 20 U/L (ref 5–45)
BASOPHILS # BLD AUTO: 0.03 THOUSANDS/ΜL (ref 0–0.1)
BASOPHILS NFR BLD AUTO: 0 % (ref 0–1)
BILIRUB SERPL-MCNC: 0.3 MG/DL (ref 0.2–1)
BILIRUB UR QL STRIP: NEGATIVE
BUN SERPL-MCNC: 10 MG/DL (ref 5–25)
CALCIUM SERPL-MCNC: 8.9 MG/DL (ref 8.3–10.1)
CHLORIDE SERPL-SCNC: 104 MMOL/L (ref 100–108)
CLARITY UR: CLEAR
CO2 SERPL-SCNC: 26 MMOL/L (ref 21–32)
COLOR UR: YELLOW
CREAT SERPL-MCNC: 0.74 MG/DL (ref 0.6–1.3)
EOSINOPHIL # BLD AUTO: 0.01 THOUSAND/ΜL (ref 0–0.61)
EOSINOPHIL NFR BLD AUTO: 0 % (ref 0–6)
ERYTHROCYTE [DISTWIDTH] IN BLOOD BY AUTOMATED COUNT: 12.5 % (ref 11.6–15.1)
EXT PREG TEST URINE: NEGATIVE
EXT. CONTROL ED NAV: NORMAL
GFR SERPL CREATININE-BSD FRML MDRD: 102 ML/MIN/1.73SQ M
GLUCOSE SERPL-MCNC: 104 MG/DL (ref 65–140)
GLUCOSE UR STRIP-MCNC: NEGATIVE MG/DL
HCT VFR BLD AUTO: 41.4 % (ref 34.8–46.1)
HGB BLD-MCNC: 13.7 G/DL (ref 11.5–15.4)
HGB UR QL STRIP.AUTO: NEGATIVE
IMM GRANULOCYTES # BLD AUTO: 0.03 THOUSAND/UL (ref 0–0.2)
IMM GRANULOCYTES NFR BLD AUTO: 0 % (ref 0–2)
KETONES UR STRIP-MCNC: NEGATIVE MG/DL
LEUKOCYTE ESTERASE UR QL STRIP: NEGATIVE
LIPASE SERPL-CCNC: 147 U/L (ref 73–393)
LYMPHOCYTES # BLD AUTO: 1.91 THOUSANDS/ΜL (ref 0.6–4.47)
LYMPHOCYTES NFR BLD AUTO: 19 % (ref 14–44)
MCH RBC QN AUTO: 30.6 PG (ref 26.8–34.3)
MCHC RBC AUTO-ENTMCNC: 33.1 G/DL (ref 31.4–37.4)
MCV RBC AUTO: 93 FL (ref 82–98)
MONOCYTES # BLD AUTO: 0.85 THOUSAND/ΜL (ref 0.17–1.22)
MONOCYTES NFR BLD AUTO: 9 % (ref 4–12)
NEUTROPHILS # BLD AUTO: 7.16 THOUSANDS/ΜL (ref 1.85–7.62)
NEUTS SEG NFR BLD AUTO: 72 % (ref 43–75)
NITRITE UR QL STRIP: NEGATIVE
NRBC BLD AUTO-RTO: 0 /100 WBCS
PH UR STRIP.AUTO: 7 [PH]
PLATELET # BLD AUTO: 259 THOUSANDS/UL (ref 149–390)
PMV BLD AUTO: 10.2 FL (ref 8.9–12.7)
POTASSIUM SERPL-SCNC: 3.9 MMOL/L (ref 3.5–5.3)
PROT SERPL-MCNC: 7 G/DL (ref 6.4–8.2)
PROT UR STRIP-MCNC: NEGATIVE MG/DL
RBC # BLD AUTO: 4.47 MILLION/UL (ref 3.81–5.12)
SODIUM SERPL-SCNC: 137 MMOL/L (ref 136–145)
SP GR UR STRIP.AUTO: 1.01 (ref 1–1.03)
UROBILINOGEN UR QL STRIP.AUTO: 0.2 E.U./DL
WBC # BLD AUTO: 9.99 THOUSAND/UL (ref 4.31–10.16)

## 2021-02-03 PROCEDURE — 81025 URINE PREGNANCY TEST: CPT | Performed by: EMERGENCY MEDICINE

## 2021-02-03 PROCEDURE — 81003 URINALYSIS AUTO W/O SCOPE: CPT | Performed by: EMERGENCY MEDICINE

## 2021-02-03 PROCEDURE — 85025 COMPLETE CBC W/AUTO DIFF WBC: CPT | Performed by: EMERGENCY MEDICINE

## 2021-02-03 PROCEDURE — 99284 EMERGENCY DEPT VISIT MOD MDM: CPT | Performed by: EMERGENCY MEDICINE

## 2021-02-03 PROCEDURE — 99284 EMERGENCY DEPT VISIT MOD MDM: CPT

## 2021-02-03 PROCEDURE — 80053 COMPREHEN METABOLIC PANEL: CPT | Performed by: EMERGENCY MEDICINE

## 2021-02-03 PROCEDURE — 36415 COLL VENOUS BLD VENIPUNCTURE: CPT

## 2021-02-03 PROCEDURE — 83690 ASSAY OF LIPASE: CPT | Performed by: EMERGENCY MEDICINE

## 2021-02-03 RX ORDER — DICYCLOMINE HCL 20 MG
20 TABLET ORAL 2 TIMES DAILY
Qty: 20 TABLET | Refills: 0 | Status: SHIPPED | OUTPATIENT
Start: 2021-02-03

## 2021-02-03 RX ORDER — DICYCLOMINE HCL 20 MG
20 TABLET ORAL ONCE
Status: COMPLETED | OUTPATIENT
Start: 2021-02-03 | End: 2021-02-03

## 2021-02-03 RX ORDER — TRIAMCINOLONE ACETONIDE 1 MG/G
CREAM TOPICAL 2 TIMES DAILY
Qty: 30 G | Refills: 0 | Status: SHIPPED | OUTPATIENT
Start: 2021-02-03

## 2021-02-03 RX ADMIN — DICYCLOMINE HYDROCHLORIDE 20 MG: 20 TABLET ORAL at 12:56
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
## 2021-02-03 NOTE — Clinical Note
Roxie Alo was seen and treated in our emergency department on 2/3/2021  Diagnosis:      Norrine Lombard  may return to work on return date  She may return on this date: 02/04/2021          If you have any questions or concerns, please don't hesitate to call        Viraj Rae DO    ______________________________           _______________          _______________  Hospital Representative                              Date                                Time

## 2021-02-24 ENCOUNTER — HOSPITAL ENCOUNTER (EMERGENCY)
Facility: HOSPITAL | Age: 41
Discharge: HOME | End: 2021-02-24
Attending: EMERGENCY MEDICINE
Payer: MEDICARE

## 2021-02-24 VITALS
RESPIRATION RATE: 16 BRPM | OXYGEN SATURATION: 96 % | SYSTOLIC BLOOD PRESSURE: 189 MMHG | BODY MASS INDEX: 44.22 KG/M2 | WEIGHT: 265.43 LBS | HEIGHT: 65 IN | DIASTOLIC BLOOD PRESSURE: 94 MMHG | HEART RATE: 82 BPM | TEMPERATURE: 98.1 F

## 2021-02-24 DIAGNOSIS — R10.9 ABDOMINAL PAIN: Primary | ICD-10-CM

## 2021-02-24 LAB
ALBUMIN SERPL BCP-MCNC: 3.5 G/DL (ref 3.5–5)
ALP SERPL-CCNC: 82 U/L (ref 46–116)
ALT SERPL W P-5'-P-CCNC: 59 U/L (ref 12–78)
ANION GAP SERPL CALCULATED.3IONS-SCNC: 7 MMOL/L (ref 4–13)
AST SERPL W P-5'-P-CCNC: 35 U/L (ref 5–45)
BASOPHILS # BLD AUTO: 0.04 THOUSANDS/ΜL (ref 0–0.1)
BASOPHILS NFR BLD AUTO: 0 % (ref 0–1)
BILIRUB SERPL-MCNC: 0.3 MG/DL (ref 0.2–1)
BUN SERPL-MCNC: 7 MG/DL (ref 5–25)
CALCIUM SERPL-MCNC: 8.5 MG/DL (ref 8.3–10.1)
CHLORIDE SERPL-SCNC: 103 MMOL/L (ref 100–108)
CO2 SERPL-SCNC: 29 MMOL/L (ref 21–32)
CREAT SERPL-MCNC: 0.84 MG/DL (ref 0.6–1.3)
EOSINOPHIL # BLD AUTO: 0.01 THOUSAND/ΜL (ref 0–0.61)
EOSINOPHIL NFR BLD AUTO: 0 % (ref 0–6)
ERYTHROCYTE [DISTWIDTH] IN BLOOD BY AUTOMATED COUNT: 12.6 % (ref 11.6–15.1)
GFR SERPL CREATININE-BSD FRML MDRD: 87 ML/MIN/1.73SQ M
GLUCOSE SERPL-MCNC: 105 MG/DL (ref 65–140)
HCT VFR BLD AUTO: 40.5 % (ref 34.8–46.1)
HGB BLD-MCNC: 13.2 G/DL (ref 11.5–15.4)
IMM GRANULOCYTES # BLD AUTO: 0.03 THOUSAND/UL (ref 0–0.2)
IMM GRANULOCYTES NFR BLD AUTO: 0 % (ref 0–2)
LIPASE SERPL-CCNC: 189 U/L (ref 73–393)
LYMPHOCYTES # BLD AUTO: 2.33 THOUSANDS/ΜL (ref 0.6–4.47)
LYMPHOCYTES NFR BLD AUTO: 21 % (ref 14–44)
MCH RBC QN AUTO: 30.3 PG (ref 26.8–34.3)
MCHC RBC AUTO-ENTMCNC: 32.6 G/DL (ref 31.4–37.4)
MCV RBC AUTO: 93 FL (ref 82–98)
MONOCYTES # BLD AUTO: 0.78 THOUSAND/ΜL (ref 0.17–1.22)
MONOCYTES NFR BLD AUTO: 7 % (ref 4–12)
NEUTROPHILS # BLD AUTO: 7.73 THOUSANDS/ΜL (ref 1.85–7.62)
NEUTS SEG NFR BLD AUTO: 72 % (ref 43–75)
NRBC BLD AUTO-RTO: 0 /100 WBCS
PLATELET # BLD AUTO: 304 THOUSANDS/UL (ref 149–390)
PMV BLD AUTO: 10.6 FL (ref 8.9–12.7)
POTASSIUM SERPL-SCNC: 3.3 MMOL/L (ref 3.5–5.3)
PROT SERPL-MCNC: 6.9 G/DL (ref 6.4–8.2)
RBC # BLD AUTO: 4.35 MILLION/UL (ref 3.81–5.12)
SODIUM SERPL-SCNC: 139 MMOL/L (ref 136–145)
WBC # BLD AUTO: 10.92 THOUSAND/UL (ref 4.31–10.16)

## 2021-02-24 PROCEDURE — 99284 EMERGENCY DEPT VISIT MOD MDM: CPT | Performed by: EMERGENCY MEDICINE

## 2021-02-24 PROCEDURE — 96361 HYDRATE IV INFUSION ADD-ON: CPT

## 2021-02-24 PROCEDURE — 85025 COMPLETE CBC W/AUTO DIFF WBC: CPT | Performed by: EMERGENCY MEDICINE

## 2021-02-24 PROCEDURE — 99284 EMERGENCY DEPT VISIT MOD MDM: CPT

## 2021-02-24 PROCEDURE — 83690 ASSAY OF LIPASE: CPT | Performed by: EMERGENCY MEDICINE

## 2021-02-24 PROCEDURE — 36415 COLL VENOUS BLD VENIPUNCTURE: CPT | Performed by: EMERGENCY MEDICINE

## 2021-02-24 PROCEDURE — 80053 COMPREHEN METABOLIC PANEL: CPT | Performed by: EMERGENCY MEDICINE

## 2021-02-24 PROCEDURE — 96374 THER/PROPH/DIAG INJ IV PUSH: CPT

## 2021-02-24 RX ORDER — OLANZAPINE 5 MG/1
10 TABLET, ORALLY DISINTEGRATING ORAL ONCE
Status: COMPLETED | OUTPATIENT
Start: 2021-02-24 | End: 2021-02-24

## 2021-02-24 RX ORDER — ONDANSETRON 4 MG/1
4 TABLET, ORALLY DISINTEGRATING ORAL EVERY 6 HOURS PRN
Qty: 20 TABLET | Refills: 0 | Status: SHIPPED | OUTPATIENT
Start: 2021-02-24

## 2021-02-24 RX ORDER — ONDANSETRON 2 MG/ML
4 INJECTION INTRAMUSCULAR; INTRAVENOUS ONCE
Status: COMPLETED | OUTPATIENT
Start: 2021-02-24 | End: 2021-02-24

## 2021-02-24 RX ADMIN — ONDANSETRON 4 MG: 2 INJECTION INTRAMUSCULAR; INTRAVENOUS at 01:26

## 2021-02-24 RX ADMIN — OLANZAPINE 10 MG: 5 TABLET, ORALLY DISINTEGRATING ORAL at 01:26

## 2021-02-24 RX ADMIN — SODIUM CHLORIDE 1000 ML: 0.9 INJECTION, SOLUTION INTRAVENOUS at 01:26

## 2021-02-24 NOTE — Clinical Note
Danie Reyes was seen and treated in our emergency department on 2/24/2021  Diagnosis:     Heriberto Lee  may return to work on return date  She may return on this date: 02/25/2021         If you have any questions or concerns, please don't hesitate to call        Shanthi Hernandez RN    ______________________________           _______________          _______________  Hospital Representative                              Date                                Time

## 2021-02-26 ENCOUNTER — HOSPITAL ENCOUNTER (EMERGENCY)
Facility: HOSPITAL | Age: 41
Discharge: HOME | End: 2021-02-26
Attending: EMERGENCY MEDICINE | Admitting: EMERGENCY MEDICINE
Payer: MEDICARE

## 2021-02-26 ENCOUNTER — APPOINTMENT (EMERGENCY)
Dept: CT IMAGING | Facility: HOSPITAL | Age: 41
End: 2021-02-26
Payer: MEDICARE

## 2021-02-26 ENCOUNTER — APPOINTMENT (EMERGENCY)
Dept: RADIOLOGY | Facility: HOSPITAL | Age: 41
End: 2021-02-26
Payer: MEDICARE

## 2021-02-26 VITALS
TEMPERATURE: 97.8 F | DIASTOLIC BLOOD PRESSURE: 84 MMHG | SYSTOLIC BLOOD PRESSURE: 173 MMHG | RESPIRATION RATE: 19 BRPM | HEART RATE: 88 BPM | OXYGEN SATURATION: 99 %

## 2021-02-26 DIAGNOSIS — R19.7 DIARRHEA: ICD-10-CM

## 2021-02-26 DIAGNOSIS — R60.0 BILATERAL LOWER EXTREMITY EDEMA: Primary | ICD-10-CM

## 2021-02-26 DIAGNOSIS — R03.0 ELEVATED BLOOD PRESSURE READING: ICD-10-CM

## 2021-02-26 LAB
ALBUMIN SERPL BCP-MCNC: 3.3 G/DL (ref 3.5–5)
ALP SERPL-CCNC: 83 U/L (ref 46–116)
ALT SERPL W P-5'-P-CCNC: 50 U/L (ref 12–78)
ANION GAP SERPL CALCULATED.3IONS-SCNC: 7 MMOL/L (ref 4–13)
APTT PPP: 25 SECONDS (ref 23–37)
AST SERPL W P-5'-P-CCNC: 26 U/L (ref 5–45)
BASOPHILS # BLD AUTO: 0.03 THOUSANDS/ΜL (ref 0–0.1)
BASOPHILS NFR BLD AUTO: 0 % (ref 0–1)
BILIRUB SERPL-MCNC: 0.2 MG/DL (ref 0.2–1)
BUN SERPL-MCNC: 6 MG/DL (ref 5–25)
CALCIUM ALBUM COR SERPL-MCNC: 9 MG/DL (ref 8.3–10.1)
CALCIUM SERPL-MCNC: 8.4 MG/DL (ref 8.3–10.1)
CHLORIDE SERPL-SCNC: 105 MMOL/L (ref 100–108)
CO2 SERPL-SCNC: 30 MMOL/L (ref 21–32)
CREAT SERPL-MCNC: 0.85 MG/DL (ref 0.6–1.3)
EOSINOPHIL # BLD AUTO: 0.01 THOUSAND/ΜL (ref 0–0.61)
EOSINOPHIL NFR BLD AUTO: 0 % (ref 0–6)
ERYTHROCYTE [DISTWIDTH] IN BLOOD BY AUTOMATED COUNT: 12.8 % (ref 11.6–15.1)
EXT PREG TEST URINE: NEGATIVE
EXT. CONTROL ED NAV: NORMAL
GFR SERPL CREATININE-BSD FRML MDRD: 86 ML/MIN/1.73SQ M
GLUCOSE SERPL-MCNC: 100 MG/DL (ref 65–140)
HCT VFR BLD AUTO: 39.8 % (ref 34.8–46.1)
HGB BLD-MCNC: 13.2 G/DL (ref 11.5–15.4)
IMM GRANULOCYTES # BLD AUTO: 0.02 THOUSAND/UL (ref 0–0.2)
IMM GRANULOCYTES NFR BLD AUTO: 0 % (ref 0–2)
INR PPP: 1 (ref 0.84–1.19)
LYMPHOCYTES # BLD AUTO: 2.09 THOUSANDS/ΜL (ref 0.6–4.47)
LYMPHOCYTES NFR BLD AUTO: 21 % (ref 14–44)
MCH RBC QN AUTO: 30.8 PG (ref 26.8–34.3)
MCHC RBC AUTO-ENTMCNC: 33.2 G/DL (ref 31.4–37.4)
MCV RBC AUTO: 93 FL (ref 82–98)
MONOCYTES # BLD AUTO: 0.72 THOUSAND/ΜL (ref 0.17–1.22)
MONOCYTES NFR BLD AUTO: 7 % (ref 4–12)
NEUTROPHILS # BLD AUTO: 7.22 THOUSANDS/ΜL (ref 1.85–7.62)
NEUTS SEG NFR BLD AUTO: 72 % (ref 43–75)
NRBC BLD AUTO-RTO: 0 /100 WBCS
NT-PROBNP SERPL-MCNC: 188 PG/ML
PLATELET # BLD AUTO: 283 THOUSANDS/UL (ref 149–390)
PMV BLD AUTO: 10.6 FL (ref 8.9–12.7)
POTASSIUM SERPL-SCNC: 3.3 MMOL/L (ref 3.5–5.3)
PROT SERPL-MCNC: 6.6 G/DL (ref 6.4–8.2)
PROTHROMBIN TIME: 12.7 SECONDS (ref 11.6–14.5)
RBC # BLD AUTO: 4.29 MILLION/UL (ref 3.81–5.12)
SODIUM SERPL-SCNC: 142 MMOL/L (ref 136–145)
TROPONIN I SERPL-MCNC: <0.02 NG/ML
WBC # BLD AUTO: 10.09 THOUSAND/UL (ref 4.31–10.16)

## 2021-02-26 PROCEDURE — G1004 CDSM NDSC: HCPCS

## 2021-02-26 PROCEDURE — 80053 COMPREHEN METABOLIC PANEL: CPT | Performed by: EMERGENCY MEDICINE

## 2021-02-26 PROCEDURE — 85610 PROTHROMBIN TIME: CPT | Performed by: EMERGENCY MEDICINE

## 2021-02-26 PROCEDURE — 99285 EMERGENCY DEPT VISIT HI MDM: CPT

## 2021-02-26 PROCEDURE — 99285 EMERGENCY DEPT VISIT HI MDM: CPT | Performed by: EMERGENCY MEDICINE

## 2021-02-26 PROCEDURE — 96374 THER/PROPH/DIAG INJ IV PUSH: CPT

## 2021-02-26 PROCEDURE — 93005 ELECTROCARDIOGRAM TRACING: CPT

## 2021-02-26 PROCEDURE — 74177 CT ABD & PELVIS W/CONTRAST: CPT

## 2021-02-26 PROCEDURE — 83880 ASSAY OF NATRIURETIC PEPTIDE: CPT | Performed by: EMERGENCY MEDICINE

## 2021-02-26 PROCEDURE — 84484 ASSAY OF TROPONIN QUANT: CPT | Performed by: EMERGENCY MEDICINE

## 2021-02-26 PROCEDURE — 81025 URINE PREGNANCY TEST: CPT | Performed by: EMERGENCY MEDICINE

## 2021-02-26 PROCEDURE — 71046 X-RAY EXAM CHEST 2 VIEWS: CPT

## 2021-02-26 PROCEDURE — 85730 THROMBOPLASTIN TIME PARTIAL: CPT | Performed by: EMERGENCY MEDICINE

## 2021-02-26 PROCEDURE — 85025 COMPLETE CBC W/AUTO DIFF WBC: CPT | Performed by: EMERGENCY MEDICINE

## 2021-02-26 PROCEDURE — 36415 COLL VENOUS BLD VENIPUNCTURE: CPT | Performed by: EMERGENCY MEDICINE

## 2021-02-26 RX ORDER — ONDANSETRON 2 MG/ML
1 INJECTION INTRAMUSCULAR; INTRAVENOUS ONCE
Status: COMPLETED | OUTPATIENT
Start: 2021-02-26 | End: 2021-02-26

## 2021-02-26 RX ORDER — FUROSEMIDE 10 MG/ML
40 INJECTION INTRAMUSCULAR; INTRAVENOUS ONCE
Status: COMPLETED | OUTPATIENT
Start: 2021-02-26 | End: 2021-02-26

## 2021-02-26 RX ORDER — ACETAMINOPHEN 325 MG/1
975 TABLET ORAL ONCE
Status: COMPLETED | OUTPATIENT
Start: 2021-02-26 | End: 2021-02-26

## 2021-02-26 RX ORDER — ONDANSETRON 2 MG/ML
4 INJECTION INTRAMUSCULAR; INTRAVENOUS ONCE
Status: DISCONTINUED | OUTPATIENT
Start: 2021-02-26 | End: 2021-02-26

## 2021-02-26 RX ADMIN — IOHEXOL 100 ML: 350 INJECTION, SOLUTION INTRAVENOUS at 02:26

## 2021-02-26 RX ADMIN — ACETAMINOPHEN 975 MG: 325 TABLET ORAL at 04:18

## 2021-02-26 RX ADMIN — FUROSEMIDE 40 MG: 10 INJECTION, SOLUTION INTRAVENOUS at 03:21
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
## 2021-02-27 ENCOUNTER — APPOINTMENT (EMERGENCY)
Dept: RADIOLOGY | Facility: HOSPITAL | Age: 41
End: 2021-02-27
Payer: MEDICARE

## 2021-02-27 ENCOUNTER — HOSPITAL ENCOUNTER (EMERGENCY)
Facility: HOSPITAL | Age: 41
Discharge: HOME | End: 2021-02-27
Attending: EMERGENCY MEDICINE | Admitting: EMERGENCY MEDICINE
Payer: MEDICARE

## 2021-02-27 VITALS
DIASTOLIC BLOOD PRESSURE: 93 MMHG | OXYGEN SATURATION: 99 % | RESPIRATION RATE: 19 BRPM | TEMPERATURE: 97.8 F | HEART RATE: 86 BPM | SYSTOLIC BLOOD PRESSURE: 190 MMHG

## 2021-02-27 DIAGNOSIS — S20.219A CHEST WALL CONTUSION: Primary | ICD-10-CM

## 2021-02-27 PROCEDURE — 99282 EMERGENCY DEPT VISIT SF MDM: CPT | Performed by: PHYSICIAN ASSISTANT

## 2021-02-27 PROCEDURE — 71046 X-RAY EXAM CHEST 2 VIEWS: CPT

## 2021-02-27 PROCEDURE — 99284 EMERGENCY DEPT VISIT MOD MDM: CPT

## 2021-02-27 RX ORDER — ACETAMINOPHEN 325 MG/1
650 TABLET ORAL ONCE
Status: DISCONTINUED | OUTPATIENT
Start: 2021-02-27 | End: 2021-02-27
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
## 2021-02-28 LAB
ATRIAL RATE: 92 BPM
P AXIS: 70 DEGREES
PR INTERVAL: 132 MS
QRS AXIS: 75 DEGREES
QRSD INTERVAL: 78 MS
QT INTERVAL: 390 MS
QTC INTERVAL: 482 MS
T WAVE AXIS: 48 DEGREES
VENTRICULAR RATE: 92 BPM

## 2021-02-28 PROCEDURE — 93010 ELECTROCARDIOGRAM REPORT: CPT | Performed by: INTERNAL MEDICINE

## 2021-03-12 ENCOUNTER — HOSPITAL ENCOUNTER (EMERGENCY)
Facility: HOSPITAL | Age: 41
Discharge: HOME | End: 2021-03-13
Attending: EMERGENCY MEDICINE
Payer: MEDICARE

## 2021-03-12 DIAGNOSIS — M54.9 BACK PAIN: Primary | ICD-10-CM

## 2021-03-12 DIAGNOSIS — L98.9 CRACKING SKIN: ICD-10-CM

## 2021-03-12 PROCEDURE — 99283 EMERGENCY DEPT VISIT LOW MDM: CPT

## 2021-03-12 NOTE — Clinical Note
Pj Bird was seen and treated in our emergency department on 3/12/2021  Diagnosis:     Roxane Cole  may return to work on return date  She may return on this date: 03/15/2021         If you have any questions or concerns, please don't hesitate to call        Sheridan Vieira MD    ______________________________           _______________          _______________  Hospital Representative                              Date                                Time

## 2021-03-13 VITALS
OXYGEN SATURATION: 97 % | TEMPERATURE: 97.9 F | DIASTOLIC BLOOD PRESSURE: 80 MMHG | HEART RATE: 73 BPM | SYSTOLIC BLOOD PRESSURE: 152 MMHG | RESPIRATION RATE: 19 BRPM

## 2021-03-13 PROCEDURE — 99284 EMERGENCY DEPT VISIT MOD MDM: CPT | Performed by: PHYSICIAN ASSISTANT

## 2021-03-13 RX ORDER — IBUPROFEN 600 MG/1
600 TABLET ORAL ONCE
Status: COMPLETED | OUTPATIENT
Start: 2021-03-13 | End: 2021-03-13

## 2021-03-13 RX ORDER — NAPROXEN 500 MG/1
500 TABLET ORAL 2 TIMES DAILY WITH MEALS
Qty: 30 TABLET | Refills: 0 | Status: SHIPPED | OUTPATIENT
Start: 2021-03-13

## 2021-03-13 RX ADMIN — IBUPROFEN 600 MG: 600 TABLET, FILM COATED ORAL at 00:07
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
## 2021-03-25 ENCOUNTER — HOSPITAL ENCOUNTER (EMERGENCY)
Facility: HOSPITAL | Age: 41
Discharge: HOME | End: 2021-03-25
Attending: EMERGENCY MEDICINE | Admitting: EMERGENCY MEDICINE
Payer: MEDICARE

## 2021-03-25 VITALS
RESPIRATION RATE: 19 BRPM | HEIGHT: 66 IN | OXYGEN SATURATION: 98 % | WEIGHT: 265.88 LBS | HEART RATE: 77 BPM | SYSTOLIC BLOOD PRESSURE: 158 MMHG | DIASTOLIC BLOOD PRESSURE: 71 MMHG | BODY MASS INDEX: 42.73 KG/M2 | TEMPERATURE: 99.2 F

## 2021-03-25 DIAGNOSIS — R09.81 NASAL CONGESTION: Primary | ICD-10-CM

## 2021-03-25 LAB
ALBUMIN SERPL BCP-MCNC: 3.6 G/DL (ref 3.5–5)
ALP SERPL-CCNC: 77 U/L (ref 46–116)
ALT SERPL W P-5'-P-CCNC: 31 U/L (ref 12–78)
ANION GAP SERPL CALCULATED.3IONS-SCNC: 8 MMOL/L (ref 4–13)
AST SERPL W P-5'-P-CCNC: 17 U/L (ref 5–45)
BASOPHILS # BLD AUTO: 0.03 THOUSANDS/ΜL (ref 0–0.1)
BASOPHILS NFR BLD AUTO: 0 % (ref 0–1)
BILIRUB SERPL-MCNC: 0.22 MG/DL (ref 0.2–1)
BUN SERPL-MCNC: 6 MG/DL (ref 5–25)
CALCIUM SERPL-MCNC: 8.7 MG/DL (ref 8.3–10.1)
CHLORIDE SERPL-SCNC: 103 MMOL/L (ref 100–108)
CO2 SERPL-SCNC: 27 MMOL/L (ref 21–32)
CREAT SERPL-MCNC: 0.81 MG/DL (ref 0.6–1.3)
EOSINOPHIL # BLD AUTO: 0.01 THOUSAND/ΜL (ref 0–0.61)
EOSINOPHIL NFR BLD AUTO: 0 % (ref 0–6)
ERYTHROCYTE [DISTWIDTH] IN BLOOD BY AUTOMATED COUNT: 12.4 % (ref 11.6–15.1)
GFR SERPL CREATININE-BSD FRML MDRD: 91 ML/MIN/1.73SQ M
GLUCOSE SERPL-MCNC: 110 MG/DL (ref 65–140)
HCT VFR BLD AUTO: 39.2 % (ref 34.8–46.1)
HGB BLD-MCNC: 13 G/DL (ref 11.5–15.4)
IMM GRANULOCYTES # BLD AUTO: 0.02 THOUSAND/UL (ref 0–0.2)
IMM GRANULOCYTES NFR BLD AUTO: 0 % (ref 0–2)
LYMPHOCYTES # BLD AUTO: 2.08 THOUSANDS/ΜL (ref 0.6–4.47)
LYMPHOCYTES NFR BLD AUTO: 25 % (ref 14–44)
MCH RBC QN AUTO: 30.6 PG (ref 26.8–34.3)
MCHC RBC AUTO-ENTMCNC: 33.2 G/DL (ref 31.4–37.4)
MCV RBC AUTO: 92 FL (ref 82–98)
MONOCYTES # BLD AUTO: 0.79 THOUSAND/ΜL (ref 0.17–1.22)
MONOCYTES NFR BLD AUTO: 9 % (ref 4–12)
NEUTROPHILS # BLD AUTO: 5.48 THOUSANDS/ΜL (ref 1.85–7.62)
NEUTS SEG NFR BLD AUTO: 66 % (ref 43–75)
NRBC BLD AUTO-RTO: 0 /100 WBCS
PLATELET # BLD AUTO: 274 THOUSANDS/UL (ref 149–390)
PMV BLD AUTO: 10.9 FL (ref 8.9–12.7)
POTASSIUM SERPL-SCNC: 3.7 MMOL/L (ref 3.5–5.3)
PROT SERPL-MCNC: 6.7 G/DL (ref 6.4–8.2)
RBC # BLD AUTO: 4.25 MILLION/UL (ref 3.81–5.12)
SODIUM SERPL-SCNC: 138 MMOL/L (ref 136–145)
WBC # BLD AUTO: 8.41 THOUSAND/UL (ref 4.31–10.16)

## 2021-03-25 PROCEDURE — 80053 COMPREHEN METABOLIC PANEL: CPT | Performed by: PHYSICIAN ASSISTANT

## 2021-03-25 PROCEDURE — 99283 EMERGENCY DEPT VISIT LOW MDM: CPT

## 2021-03-25 PROCEDURE — 99282 EMERGENCY DEPT VISIT SF MDM: CPT | Performed by: PHYSICIAN ASSISTANT

## 2021-03-25 PROCEDURE — 85025 COMPLETE CBC W/AUTO DIFF WBC: CPT | Performed by: PHYSICIAN ASSISTANT

## 2021-03-25 PROCEDURE — 36415 COLL VENOUS BLD VENIPUNCTURE: CPT | Performed by: PHYSICIAN ASSISTANT

## 2021-03-25 RX ORDER — ECHINACEA PURPUREA EXTRACT 125 MG
1 TABLET ORAL ONCE
Status: COMPLETED | OUTPATIENT
Start: 2021-03-25 | End: 2021-03-25

## 2021-03-25 RX ADMIN — SALINE NASAL SPRAY 1 SPRAY: 1.5 SOLUTION NASAL at 05:54
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
## 2021-03-27 ENCOUNTER — HOSPITAL ENCOUNTER (EMERGENCY)
Facility: HOSPITAL | Age: 41
Discharge: HOME | End: 2021-03-27
Attending: EMERGENCY MEDICINE | Admitting: EMERGENCY MEDICINE
Payer: MEDICARE

## 2021-03-27 VITALS
DIASTOLIC BLOOD PRESSURE: 70 MMHG | BODY MASS INDEX: 43.06 KG/M2 | OXYGEN SATURATION: 99 % | WEIGHT: 266.76 LBS | TEMPERATURE: 98.5 F | SYSTOLIC BLOOD PRESSURE: 128 MMHG | HEART RATE: 88 BPM | RESPIRATION RATE: 18 BRPM

## 2021-03-27 DIAGNOSIS — M79.10 MYALGIA: ICD-10-CM

## 2021-03-27 DIAGNOSIS — R09.81 NASAL CONGESTION: Primary | ICD-10-CM

## 2021-03-27 PROCEDURE — 99284 EMERGENCY DEPT VISIT MOD MDM: CPT

## 2021-03-27 PROCEDURE — 99284 EMERGENCY DEPT VISIT MOD MDM: CPT | Performed by: PHYSICIAN ASSISTANT

## 2021-03-27 RX ORDER — IBUPROFEN 400 MG/1
400 TABLET ORAL EVERY 6 HOURS PRN
Qty: 30 TABLET | Refills: 0 | Status: SHIPPED | OUTPATIENT
Start: 2021-03-27 | End: 2021-04-01

## 2021-03-27 RX ORDER — FEXOFENADINE HYDROCHLORIDE 60 MG/1
60 TABLET, FILM COATED ORAL 2 TIMES DAILY PRN
Qty: 20 TABLET | Refills: 0 | Status: SHIPPED | OUTPATIENT
Start: 2021-03-27 | End: 2021-04-06
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
## 2021-03-28 ENCOUNTER — HOSPITAL ENCOUNTER (EMERGENCY)
Facility: HOSPITAL | Age: 41
Discharge: HOME | End: 2021-03-28
Attending: EMERGENCY MEDICINE
Payer: MEDICARE

## 2021-03-28 VITALS
SYSTOLIC BLOOD PRESSURE: 175 MMHG | RESPIRATION RATE: 18 BRPM | DIASTOLIC BLOOD PRESSURE: 81 MMHG | HEART RATE: 72 BPM | OXYGEN SATURATION: 99 % | TEMPERATURE: 97.9 F

## 2021-03-28 DIAGNOSIS — R09.81 NASAL CONGESTION: ICD-10-CM

## 2021-03-28 DIAGNOSIS — S05.00XA CORNEAL ABRASION: Primary | ICD-10-CM

## 2021-03-28 PROCEDURE — 99284 EMERGENCY DEPT VISIT MOD MDM: CPT | Performed by: EMERGENCY MEDICINE

## 2021-03-28 PROCEDURE — 99283 EMERGENCY DEPT VISIT LOW MDM: CPT

## 2021-03-28 RX ORDER — IBUPROFEN 800 MG/1
800 TABLET ORAL 3 TIMES DAILY
Qty: 21 TABLET | Refills: 0 | Status: SHIPPED | OUTPATIENT
Start: 2021-03-28

## 2021-03-28 RX ORDER — IBUPROFEN 400 MG/1
800 TABLET ORAL ONCE
Status: COMPLETED | OUTPATIENT
Start: 2021-03-28 | End: 2021-03-28

## 2021-03-28 RX ORDER — DIPHENHYDRAMINE HCL 25 MG
25 TABLET ORAL EVERY 6 HOURS
Qty: 20 TABLET | Refills: 0 | Status: SHIPPED | OUTPATIENT
Start: 2021-03-28

## 2021-03-28 RX ORDER — OFLOXACIN 3 MG/ML
1 SOLUTION/ DROPS OPHTHALMIC 4 TIMES DAILY
Qty: 1 ML | Refills: 0 | Status: SHIPPED | OUTPATIENT
Start: 2021-03-28 | End: 2021-04-02

## 2021-03-28 RX ORDER — TETRACAINE HYDROCHLORIDE 5 MG/ML
1 SOLUTION OPHTHALMIC ONCE
Status: COMPLETED | OUTPATIENT
Start: 2021-03-28 | End: 2021-03-28

## 2021-03-28 RX ORDER — DIPHENHYDRAMINE HCL 25 MG
25 TABLET ORAL ONCE
Status: COMPLETED | OUTPATIENT
Start: 2021-03-28 | End: 2021-03-28

## 2021-03-28 RX ADMIN — IBUPROFEN 800 MG: 400 TABLET ORAL at 18:47

## 2021-03-28 RX ADMIN — FLUORESCEIN SODIUM 1 STRIP: 1 STRIP OPHTHALMIC at 18:14

## 2021-03-28 RX ADMIN — DIPHENHYDRAMINE HYDROCHLORIDE 25 MG: 25 TABLET ORAL at 18:47

## 2021-03-28 RX ADMIN — TETRACAINE HYDROCHLORIDE 1 DROP: 5 SOLUTION OPHTHALMIC at 18:14
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
## 2021-04-18 ENCOUNTER — HOSPITAL ENCOUNTER (EMERGENCY)
Facility: HOSPITAL | Age: 41
Discharge: HOME | End: 2021-04-19
Attending: EMERGENCY MEDICINE
Payer: MEDICARE

## 2021-04-18 DIAGNOSIS — I10 HYPERTENSION: Primary | ICD-10-CM

## 2021-04-18 DIAGNOSIS — F31.11 BIPOLAR AFFECTIVE DISORDER, CURRENTLY MANIC, MILD (HCC): ICD-10-CM

## 2021-04-18 LAB
ANION GAP SERPL CALCULATED.3IONS-SCNC: 9 MMOL/L (ref 4–13)
BASOPHILS # BLD AUTO: 0.04 THOUSANDS/ΜL (ref 0–0.1)
BASOPHILS NFR BLD AUTO: 0 % (ref 0–1)
BUN SERPL-MCNC: 14 MG/DL (ref 5–25)
CALCIUM SERPL-MCNC: 8.2 MG/DL (ref 8.3–10.1)
CHLORIDE SERPL-SCNC: 104 MMOL/L (ref 100–108)
CO2 SERPL-SCNC: 26 MMOL/L (ref 21–32)
CREAT SERPL-MCNC: 0.83 MG/DL (ref 0.6–1.3)
EOSINOPHIL # BLD AUTO: 0.01 THOUSAND/ΜL (ref 0–0.61)
EOSINOPHIL NFR BLD AUTO: 0 % (ref 0–6)
ERYTHROCYTE [DISTWIDTH] IN BLOOD BY AUTOMATED COUNT: 12.2 % (ref 11.6–15.1)
ETHANOL SERPL-MCNC: 4 MG/DL (ref 0–3)
GFR SERPL CREATININE-BSD FRML MDRD: 88 ML/MIN/1.73SQ M
GLUCOSE SERPL-MCNC: 94 MG/DL (ref 65–140)
HCT VFR BLD AUTO: 41.3 % (ref 34.8–46.1)
HGB BLD-MCNC: 13.6 G/DL (ref 11.5–15.4)
IMM GRANULOCYTES # BLD AUTO: 0.02 THOUSAND/UL (ref 0–0.2)
IMM GRANULOCYTES NFR BLD AUTO: 0 % (ref 0–2)
LYMPHOCYTES # BLD AUTO: 2.69 THOUSANDS/ΜL (ref 0.6–4.47)
LYMPHOCYTES NFR BLD AUTO: 28 % (ref 14–44)
MCH RBC QN AUTO: 30.1 PG (ref 26.8–34.3)
MCHC RBC AUTO-ENTMCNC: 32.9 G/DL (ref 31.4–37.4)
MCV RBC AUTO: 91 FL (ref 82–98)
MONOCYTES # BLD AUTO: 0.71 THOUSAND/ΜL (ref 0.17–1.22)
MONOCYTES NFR BLD AUTO: 7 % (ref 4–12)
NEUTROPHILS # BLD AUTO: 6.27 THOUSANDS/ΜL (ref 1.85–7.62)
NEUTS SEG NFR BLD AUTO: 65 % (ref 43–75)
NRBC BLD AUTO-RTO: 0 /100 WBCS
PLATELET # BLD AUTO: 302 THOUSANDS/UL (ref 149–390)
PMV BLD AUTO: 10.1 FL (ref 8.9–12.7)
POTASSIUM SERPL-SCNC: 3.6 MMOL/L (ref 3.5–5.3)
RBC # BLD AUTO: 4.52 MILLION/UL (ref 3.81–5.12)
SODIUM SERPL-SCNC: 139 MMOL/L (ref 136–145)
WBC # BLD AUTO: 9.74 THOUSAND/UL (ref 4.31–10.16)

## 2021-04-18 PROCEDURE — 99284 EMERGENCY DEPT VISIT MOD MDM: CPT | Performed by: NURSE PRACTITIONER

## 2021-04-18 PROCEDURE — 36415 COLL VENOUS BLD VENIPUNCTURE: CPT | Performed by: NURSE PRACTITIONER

## 2021-04-18 PROCEDURE — 99284 EMERGENCY DEPT VISIT MOD MDM: CPT

## 2021-04-18 PROCEDURE — 96372 THER/PROPH/DIAG INJ SC/IM: CPT

## 2021-04-18 PROCEDURE — 82077 ASSAY SPEC XCP UR&BREATH IA: CPT | Performed by: NURSE PRACTITIONER

## 2021-04-18 PROCEDURE — 85025 COMPLETE CBC W/AUTO DIFF WBC: CPT | Performed by: NURSE PRACTITIONER

## 2021-04-18 PROCEDURE — 80048 BASIC METABOLIC PNL TOTAL CA: CPT | Performed by: NURSE PRACTITIONER

## 2021-04-18 RX ORDER — OLANZAPINE 10 MG/1
10 INJECTION, POWDER, LYOPHILIZED, FOR SOLUTION INTRAMUSCULAR ONCE
Status: COMPLETED | OUTPATIENT
Start: 2021-04-18 | End: 2021-04-18

## 2021-04-18 RX ADMIN — OLANZAPINE 10 MG: 10 INJECTION, POWDER, FOR SOLUTION INTRAMUSCULAR at 22:35

## 2021-04-18 RX ADMIN — WATER 2.1 ML: 1 INJECTION INTRAMUSCULAR; INTRAVENOUS; SUBCUTANEOUS at 22:38

## 2021-04-19 VITALS
HEART RATE: 80 BPM | TEMPERATURE: 98.3 F | BODY MASS INDEX: 43.39 KG/M2 | SYSTOLIC BLOOD PRESSURE: 140 MMHG | WEIGHT: 270 LBS | HEIGHT: 66 IN | OXYGEN SATURATION: 99 % | DIASTOLIC BLOOD PRESSURE: 72 MMHG | RESPIRATION RATE: 16 BRPM

## 2021-04-19 PROBLEM — I10 HYPERTENSION: Status: ACTIVE | Noted: 2021-04-19

## 2021-04-19 PROBLEM — F31.11 BIPOLAR AFFECTIVE DISORDER, CURRENTLY MANIC, MILD (HCC): Status: ACTIVE | Noted: 2021-04-19
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
## 2021-04-19 NOTE — ED NOTES
Patient states that she no longer wishes to wait for crisis worker this morning, stating she cannot be here all day  Provider was made aware  Patient was offered a warm handoff and agreed to be contacted by phone by their office for a consult  Patient information was sent to [facility] drug/alcohol commission to follow up with warm handoff        Susana Cantu RN  04/19/21 5232

## 2021-06-15 ENCOUNTER — APPOINTMENT (OUTPATIENT)
Dept: LAB | Facility: HOSPITAL | Age: 41
End: 2021-06-15
Payer: MEDICARE

## 2021-06-15 ENCOUNTER — TRANSCRIBE ORDERS (OUTPATIENT)
Dept: ADMINISTRATIVE | Facility: HOSPITAL | Age: 41
End: 2021-06-15

## 2021-06-15 DIAGNOSIS — I10 ESSENTIAL HYPERTENSION, MALIGNANT: ICD-10-CM

## 2021-06-15 DIAGNOSIS — R73.01 IMPAIRED FASTING GLUCOSE: ICD-10-CM

## 2021-06-15 DIAGNOSIS — R73.01 IMPAIRED FASTING GLUCOSE: Primary | ICD-10-CM

## 2021-06-15 LAB
ALBUMIN SERPL BCP-MCNC: 3.7 G/DL (ref 3.5–5)
ALP SERPL-CCNC: 82 U/L (ref 46–116)
ALT SERPL W P-5'-P-CCNC: 29 U/L (ref 12–78)
ANION GAP SERPL CALCULATED.3IONS-SCNC: 8 MMOL/L (ref 4–13)
AST SERPL W P-5'-P-CCNC: 12 U/L (ref 5–45)
BASOPHILS # BLD AUTO: 0.02 THOUSANDS/ΜL (ref 0–0.1)
BASOPHILS NFR BLD AUTO: 0 % (ref 0–1)
BILIRUB SERPL-MCNC: 0.44 MG/DL (ref 0.2–1)
BUN SERPL-MCNC: 12 MG/DL (ref 5–25)
CALCIUM SERPL-MCNC: 8.6 MG/DL (ref 8.3–10.1)
CHLORIDE SERPL-SCNC: 105 MMOL/L (ref 100–108)
CHOLEST SERPL-MCNC: 167 MG/DL (ref 50–200)
CO2 SERPL-SCNC: 28 MMOL/L (ref 21–32)
CREAT SERPL-MCNC: 0.67 MG/DL (ref 0.6–1.3)
CREAT UR-MCNC: 48.7 MG/DL
EOSINOPHIL # BLD AUTO: 0 THOUSAND/ΜL (ref 0–0.61)
EOSINOPHIL NFR BLD AUTO: 0 % (ref 0–6)
ERYTHROCYTE [DISTWIDTH] IN BLOOD BY AUTOMATED COUNT: 13.3 % (ref 11.6–15.1)
EST. AVERAGE GLUCOSE BLD GHB EST-MCNC: 108 MG/DL
GFR SERPL CREATININE-BSD FRML MDRD: 110 ML/MIN/1.73SQ M
GLUCOSE P FAST SERPL-MCNC: 96 MG/DL (ref 65–99)
HBA1C MFR BLD: 5.4 %
HCT VFR BLD AUTO: 40.8 % (ref 34.8–46.1)
HDLC SERPL-MCNC: 59 MG/DL
HGB BLD-MCNC: 13 G/DL (ref 11.5–15.4)
IMM GRANULOCYTES # BLD AUTO: 0.03 THOUSAND/UL (ref 0–0.2)
IMM GRANULOCYTES NFR BLD AUTO: 0 % (ref 0–2)
LDLC SERPL CALC-MCNC: 97 MG/DL (ref 0–100)
LYMPHOCYTES # BLD AUTO: 1.84 THOUSANDS/ΜL (ref 0.6–4.47)
LYMPHOCYTES NFR BLD AUTO: 22 % (ref 14–44)
MCH RBC QN AUTO: 29.3 PG (ref 26.8–34.3)
MCHC RBC AUTO-ENTMCNC: 31.9 G/DL (ref 31.4–37.4)
MCV RBC AUTO: 92 FL (ref 82–98)
MICROALBUMIN UR-MCNC: 6.7 MG/L (ref 0–20)
MICROALBUMIN/CREAT 24H UR: 14 MG/G CREATININE (ref 0–30)
MONOCYTES # BLD AUTO: 0.56 THOUSAND/ΜL (ref 0.17–1.22)
MONOCYTES NFR BLD AUTO: 7 % (ref 4–12)
NEUTROPHILS # BLD AUTO: 5.89 THOUSANDS/ΜL (ref 1.85–7.62)
NEUTS SEG NFR BLD AUTO: 71 % (ref 43–75)
NONHDLC SERPL-MCNC: 108 MG/DL
NRBC BLD AUTO-RTO: 0 /100 WBCS
PLATELET # BLD AUTO: 268 THOUSANDS/UL (ref 149–390)
PMV BLD AUTO: 10.1 FL (ref 8.9–12.7)
POTASSIUM SERPL-SCNC: 4.2 MMOL/L (ref 3.5–5.3)
PROT SERPL-MCNC: 6.6 G/DL (ref 6.4–8.2)
RBC # BLD AUTO: 4.43 MILLION/UL (ref 3.81–5.12)
SODIUM SERPL-SCNC: 141 MMOL/L (ref 136–145)
TRIGL SERPL-MCNC: 55 MG/DL
TSH SERPL DL<=0.05 MIU/L-ACNC: 1.45 UIU/ML (ref 0.36–3.74)
WBC # BLD AUTO: 8.34 THOUSAND/UL (ref 4.31–10.16)

## 2021-06-15 PROCEDURE — 36415 COLL VENOUS BLD VENIPUNCTURE: CPT

## 2021-06-15 PROCEDURE — 82570 ASSAY OF URINE CREATININE: CPT | Performed by: FAMILY MEDICINE

## 2021-06-15 PROCEDURE — 80061 LIPID PANEL: CPT

## 2021-06-15 PROCEDURE — 82043 UR ALBUMIN QUANTITATIVE: CPT | Performed by: FAMILY MEDICINE

## 2021-06-15 PROCEDURE — 85025 COMPLETE CBC W/AUTO DIFF WBC: CPT

## 2021-06-15 PROCEDURE — 84443 ASSAY THYROID STIM HORMONE: CPT

## 2021-06-15 PROCEDURE — 83036 HEMOGLOBIN GLYCOSYLATED A1C: CPT

## 2021-06-15 PROCEDURE — 80053 COMPREHEN METABOLIC PANEL: CPT

## 2021-08-07 ENCOUNTER — HOSPITAL ENCOUNTER (EMERGENCY)
Facility: HOSPITAL | Age: 41
Discharge: HOME | End: 2021-08-07
Attending: EMERGENCY MEDICINE | Admitting: EMERGENCY MEDICINE
Payer: MEDICARE

## 2021-08-07 VITALS
OXYGEN SATURATION: 98 % | DIASTOLIC BLOOD PRESSURE: 82 MMHG | TEMPERATURE: 98.3 F | HEART RATE: 97 BPM | RESPIRATION RATE: 18 BRPM | SYSTOLIC BLOOD PRESSURE: 147 MMHG

## 2021-08-07 DIAGNOSIS — F41.9 ANXIETY: Primary | ICD-10-CM

## 2021-08-07 LAB
ATRIAL RATE: 99 BPM
GLUCOSE SERPL-MCNC: 118 MG/DL (ref 65–140)
P AXIS: 57 DEGREES
PR INTERVAL: 138 MS
QRS AXIS: 37 DEGREES
QRSD INTERVAL: 82 MS
QT INTERVAL: 352 MS
QTC INTERVAL: 451 MS
T WAVE AXIS: 38 DEGREES
VENTRICULAR RATE: 99 BPM

## 2021-08-07 PROCEDURE — 82948 REAGENT STRIP/BLOOD GLUCOSE: CPT

## 2021-08-07 PROCEDURE — 93005 ELECTROCARDIOGRAM TRACING: CPT

## 2021-08-07 PROCEDURE — 93010 ELECTROCARDIOGRAM REPORT: CPT | Performed by: INTERNAL MEDICINE

## 2021-08-07 PROCEDURE — 99285 EMERGENCY DEPT VISIT HI MDM: CPT | Performed by: EMERGENCY MEDICINE

## 2021-08-07 PROCEDURE — 99284 EMERGENCY DEPT VISIT MOD MDM: CPT

## 2021-08-07 RX ORDER — HYDROXYZINE HYDROCHLORIDE 25 MG/1
25 TABLET, FILM COATED ORAL ONCE
Status: COMPLETED | OUTPATIENT
Start: 2021-08-07 | End: 2021-08-07

## 2021-08-07 RX ADMIN — HYDROXYZINE HYDROCHLORIDE 25 MG: 25 TABLET ORAL at 11:16

## 2021-08-07 NOTE — DISCHARGE INSTRUCTIONS
Anxiety   WHAT YOU SHOULD KNOW:   Anxiety is a condition that causes you to feel excessive worry, uneasiness, or fear  Family or work stress, smoking, caffeine, and alcohol can increase your risk for anxiety  Certain medicines or health conditions can also increase your risk  Anxiety may begin gradually, and can become a long-term condition if it is not managed or treated  AFTER YOU LEAVE:   Medicines:   · Medicines  can help you feel more calm and relaxed, and decrease your symptoms  · Take your medicine as directed  Contact your healthcare provider if you think your medicine is not helping or if you have side effects  Tell him if you are allergic to any medicine  Keep a list of the medicines, vitamins, and herbs you take  Include the amounts, and when and why you take them  Bring the list or the pill bottles to follow-up visits  Carry your medicine list with you in case of an emergency  Follow up with your healthcare provider within 2 weeks or as directed:  Write down your questions so you remember to ask them during your visits  Manage anxiety:   · Go to counseling as directed  Cognitive behavioral therapy can help you understand and change how you react to events that trigger your symptoms  · Find ways to manage your symptoms  Activities such as exercise, meditation, or listening to music can help you relax  · Practice deep breathing  Breathing can change how your body reacts to stress  Focus on taking slow, deep breaths several times a day, or during an anxiety attack  Breathe in through your nose, and out through your mouth  · Avoid caffeine  Caffeine can make your symptoms worse  Avoid foods or drinks that are meant to increase your energy level  · Limit or avoid alcohol  Ask your healthcare provider if alcohol is safe for you  You may not be able to drink alcohol if you take certain anxiety or depression medicines  Limit alcohol to 1 drink per day if you are a woman   Limit alcohol to 2 drinks per day if you are a man  A drink of alcohol is 12 ounces of beer, 5 ounces of wine, or 1½ ounces of liquor  Contact your healthcare provider if:   · Your symptoms get worse or do not get better with treatment  · You think your medicine may be causing side effects  · Your anxiety keeps you from doing your regular daily activities  · You have new symptoms since your last visit  · You have questions or concerns about your condition or care  Seek care immediately or call 911 if:   · You have chest pain, tightness, or heaviness that may spread to your shoulders, arms, jaw, neck, or back  · You feel like hurting yourself or someone else  · You feel dizzy, lightheaded, or faint  © 2014 [address] is for End User's use only and may not be sold, redistributed or otherwise used for commercial purposes  All illustrations and images included in CareNotes® are the copyrighted property of [employer] M [employer]  or Colin Christianson  The above information is an  only  It is not intended as medical advice for individual conditions or treatments  Talk to your doctor, nurse or pharmacist before following any medical regimen to see if it is safe and effective for you  Lightheadedness   WHAT YOU NEED TO KNOW:   Lightheadedness is the feeling that you may faint, but you do not  Your heartbeat may be fast or feel like it flutters  Lightheadedness may occur when you take certain medicines, such as medicine to lower your blood pressure  Dehydration, low sodium, low blood sugar, an abnormal heart rhythm, and anxiety are other common causes  DISCHARGE INSTRUCTIONS:   Return to the emergency department if:   · You have sudden chest pain  · You have trouble breathing or shortness of breath  · You have vision changes, are sweating, and have nausea while you are sitting or lying down  · You feel flushed and your heart is fluttering      · You faint  Contact your healthcare provider if:   · You feel lightheaded often  · Your heart beats faster or slower than usual      · You have questions or concerns about your condition or care  Follow up with your healthcare provider as directed: You may need more tests to help find the cause of your lightheadedness  The tests will help healthcare providers plan the best treatment for you  Write down your questions so you remember to ask them during your visits  Self-care:  Talk with your healthcare provider about these and other ways to manage your symptoms:  · Lie down  when you feel lightheaded, your throat gets tight, or your vision changes  Raise your legs above the level of your heart  · Stand up slowly  Sit on the side of the bed or couch for a few minutes before you stand up  · Take slow, deep breaths when you feel lightheaded  This can help decrease the feeling that you might faint  · Ask if you need to avoid hot baths and saunas  These may make your symptoms worse  Watch for signs of low blood sugar: These include hunger, nervousness, sweating, and fast or fluttery heartbeats  Talk with your healthcare provider about ways to keep your blood sugar level steady  Check your blood pressure often:  You should do this especially if you take medicine to lower your blood pressure  Check your blood pressure when you are lying down and when you are standing  Ask how often to check during the day  Keep a record of your blood pressure numbers  Your healthcare provider may use the record to help plan your treatment  Keep a record of your lightheadedness episodes:  Include your symptoms and your activity before and after the episode  The record can help your healthcare provider find the cause of your lightheadedness and help you manage episodes    © Copyright [employer] 2021 Information is for End User's use only and may not be sold, redistributed or otherwise used for commercial purposes  All illustrations and images included in CareNotes® are the copyrighted property of A D A M , Inc  or Luzmaria Kat  The above information is an  only  It is not intended as medical advice for individual conditions or treatments  Talk to your doctor, nurse or pharmacist before following any medical regimen to see if it is safe and effective for you
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
## 2022-08-17 ENCOUNTER — HOSPITAL ENCOUNTER (INPATIENT)
Facility: HOSPITAL | Age: 42
LOS: 3 days | DRG: 305 | End: 2022-08-22
Attending: EMERGENCY MEDICINE | Admitting: INTERNAL MEDICINE
Payer: MEDICARE

## 2022-08-17 ENCOUNTER — APPOINTMENT (EMERGENCY)
Dept: CT IMAGING | Facility: HOSPITAL | Age: 42
DRG: 305 | End: 2022-08-17
Payer: MEDICARE

## 2022-08-17 ENCOUNTER — APPOINTMENT (OUTPATIENT)
Dept: NON INVASIVE DIAGNOSTICS | Facility: HOSPITAL | Age: 42
DRG: 305 | End: 2022-08-17
Payer: MEDICARE

## 2022-08-17 DIAGNOSIS — R41.0 DELIRIUM: Primary | ICD-10-CM

## 2022-08-17 DIAGNOSIS — R77.8 ELEVATED TROPONIN: ICD-10-CM

## 2022-08-17 DIAGNOSIS — F31.11 BIPOLAR AFFECTIVE DISORDER, CURRENTLY MANIC, MILD (HCC): ICD-10-CM

## 2022-08-17 DIAGNOSIS — R79.89 ELEVATED LACTIC ACID LEVEL: ICD-10-CM

## 2022-08-17 LAB
2HR DELTA HS TROPONIN: -42 NG/L
2HR DELTA HS TROPONIN: 420 NG/L
4HR DELTA HS TROPONIN: -45 NG/L
4HR DELTA HS TROPONIN: 629 NG/L
ALBUMIN SERPL BCP-MCNC: 4.2 G/DL (ref 3.5–5)
ALP SERPL-CCNC: 99 U/L (ref 46–116)
ALT SERPL W P-5'-P-CCNC: 34 U/L (ref 12–78)
AMMONIA PLAS-SCNC: 11 UMOL/L (ref 11–35)
AMPHETAMINES SERPL QL SCN: NEGATIVE
ANION GAP SERPL CALCULATED.3IONS-SCNC: 12 MMOL/L (ref 4–13)
AORTIC ROOT: 2.7 CM
AORTIC VALVE MEAN VELOCITY: 12.8 M/S
APAP SERPL-MCNC: <2 UG/ML (ref 10–20)
APICAL FOUR CHAMBER EJECTION FRACTION: 66 %
APTT PPP: 23 SECONDS (ref 23–37)
ASCENDING AORTA: 3.3 CM
AST SERPL W P-5'-P-CCNC: 19 U/L (ref 5–45)
ATRIAL RATE: 102 BPM
ATRIAL RATE: 114 BPM
AV LVOT MEAN GRADIENT: 5 MMHG
AV LVOT PEAK GRADIENT: 10 MMHG
AV MEAN GRADIENT: 7 MMHG
AV PEAK GRADIENT: 12 MMHG
AV VELOCITY RATIO: 0.9
BARBITURATES UR QL: NEGATIVE
BASOPHILS # BLD AUTO: 0.02 THOUSANDS/ΜL (ref 0–0.1)
BASOPHILS NFR BLD AUTO: 0 % (ref 0–1)
BENZODIAZ UR QL: POSITIVE
BILIRUB SERPL-MCNC: 0.19 MG/DL (ref 0.2–1)
BUN SERPL-MCNC: 18 MG/DL (ref 5–25)
CALCIUM SERPL-MCNC: 9.1 MG/DL (ref 8.3–10.1)
CARDIAC TROPONIN I PNL SERPL HS: 479 NG/L
CARDIAC TROPONIN I PNL SERPL HS: 482 NG/L
CARDIAC TROPONIN I PNL SERPL HS: 498 NG/L
CARDIAC TROPONIN I PNL SERPL HS: 524 NG/L
CARDIAC TROPONIN I PNL SERPL HS: 707 NG/L
CARDIAC TROPONIN I PNL SERPL HS: 78 NG/L
CHLORIDE SERPL-SCNC: 107 MMOL/L (ref 96–108)
CK MB SERPL-MCNC: 1 NG/ML (ref 0–5)
CK MB SERPL-MCNC: <1 % (ref 0–2.5)
CK SERPL-CCNC: 155 U/L (ref 26–192)
CO2 SERPL-SCNC: 22 MMOL/L (ref 21–32)
COCAINE UR QL: NEGATIVE
CREAT SERPL-MCNC: 1 MG/DL (ref 0.6–1.3)
DOP CALC AO PEAK VEL: 1.74 M/S
DOP CALC AO VTI: 32.15 CM
DOP CALC LVOT PEAK VEL VTI: 31.75 CM
DOP CALC LVOT PEAK VEL: 1.57 M/S
E WAVE DECELERATION TIME: 153 MS
EOSINOPHIL # BLD AUTO: 0 THOUSAND/ΜL (ref 0–0.61)
EOSINOPHIL NFR BLD AUTO: 0 % (ref 0–6)
ERYTHROCYTE [DISTWIDTH] IN BLOOD BY AUTOMATED COUNT: 13.2 % (ref 11.6–15.1)
ETHANOL SERPL-MCNC: <3 MG/DL (ref 0–3)
EXT PREG TEST URINE: NEGATIVE
EXT. CONTROL ED NAV: NORMAL
FRACTIONAL SHORTENING: 53 % (ref 28–44)
GFR SERPL CREATININE-BSD FRML MDRD: 70 ML/MIN/1.73SQ M
GLUCOSE SERPL-MCNC: 122 MG/DL (ref 65–140)
HCT VFR BLD AUTO: 44.5 % (ref 34.8–46.1)
HGB BLD-MCNC: 14.8 G/DL (ref 11.5–15.4)
IMM GRANULOCYTES # BLD AUTO: 0.04 THOUSAND/UL (ref 0–0.2)
IMM GRANULOCYTES NFR BLD AUTO: 0 % (ref 0–2)
INR PPP: 1.06 (ref 0.84–1.19)
INTERVENTRICULAR SEPTUM IN DIASTOLE (PARASTERNAL SHORT AXIS VIEW): 0.9 CM
INTERVENTRICULAR SEPTUM: 0.9 CM (ref 0.6–1.1)
LAAS-AP2: 13.1 CM2
LAAS-AP4: 17.4 CM2
LACTATE SERPL-SCNC: 0.8 MMOL/L (ref 0.5–2)
LACTATE SERPL-SCNC: 3.6 MMOL/L (ref 0.5–2)
LEFT ATRIUM AREA SYSTOLE SINGLE PLANE A4C: 16.7 CM2
LEFT ATRIUM SIZE: 3.7 CM
LEFT INTERNAL DIMENSION IN SYSTOLE: 2.1 CM (ref 2.1–4)
LEFT VENTRICULAR INTERNAL DIMENSION IN DIASTOLE: 4.5 CM (ref 3.5–6)
LEFT VENTRICULAR POSTERIOR WALL IN END DIASTOLE: 1 CM
LEFT VENTRICULAR STROKE VOLUME: 77 ML
LVSV (TEICH): 77 ML
LYMPHOCYTES # BLD AUTO: 1.66 THOUSANDS/ΜL (ref 0.6–4.47)
LYMPHOCYTES NFR BLD AUTO: 13 % (ref 14–44)
MCH RBC QN AUTO: 30.8 PG (ref 26.8–34.3)
MCHC RBC AUTO-ENTMCNC: 33.3 G/DL (ref 31.4–37.4)
MCV RBC AUTO: 93 FL (ref 82–98)
METHADONE UR QL: NEGATIVE
MITRAL REGURGITATION PEAK VELOCITY: 6.36 M/S
MITRAL VALVE MEAN INFLOW VELOCITY: 5.3 M/S
MITRAL VALVE REGURGITANT PEAK GRADIENT: 162 MMHG
MONOCYTES # BLD AUTO: 1.11 THOUSAND/ΜL (ref 0.17–1.22)
MONOCYTES NFR BLD AUTO: 8 % (ref 4–12)
MV E'TISSUE VEL-SEP: 14 CM/S
MV PEAK A VEL: 1.06 M/S
MV PEAK E VEL: 105 CM/S
MV STENOSIS PRESSURE HALF TIME: 44 MS
MV VALVE AREA P 1/2 METHOD: 5 CM2
NEUTROPHILS # BLD AUTO: 10.32 THOUSANDS/ΜL (ref 1.85–7.62)
NEUTS SEG NFR BLD AUTO: 79 % (ref 43–75)
NRBC BLD AUTO-RTO: 0 /100 WBCS
OPIATES UR QL SCN: NEGATIVE
OXYCODONE+OXYMORPHONE UR QL SCN: NEGATIVE
P AXIS: 63 DEGREES
P AXIS: 64 DEGREES
PCP UR QL: NEGATIVE
PLATELET # BLD AUTO: 295 THOUSANDS/UL (ref 149–390)
PMV BLD AUTO: 11.1 FL (ref 8.9–12.7)
POTASSIUM SERPL-SCNC: 3.9 MMOL/L (ref 3.5–5.3)
PR INTERVAL: 132 MS
PR INTERVAL: 132 MS
PROT SERPL-MCNC: 7.2 G/DL (ref 6.4–8.4)
PROTHROMBIN TIME: 13.6 SECONDS (ref 11.6–14.5)
QRS AXIS: 55 DEGREES
QRS AXIS: 63 DEGREES
QRSD INTERVAL: 78 MS
QRSD INTERVAL: 82 MS
QT INTERVAL: 322 MS
QT INTERVAL: 346 MS
QTC INTERVAL: 443 MS
QTC INTERVAL: 450 MS
RBC # BLD AUTO: 4.81 MILLION/UL (ref 3.81–5.12)
RIGHT ATRIUM AREA SYSTOLE A4C: 11.2 CM2
RIGHT VENTRICLE ID DIMENSION: 2.7 CM
SALICYLATES SERPL-MCNC: 3.5 MG/DL (ref 3–20)
SL CV DOP CALC MV VTI RETROGRADE: 163.5 CM
SL CV LEFT ATRIUM LENGTH A2C: 4.4 CM
SL CV LV EF: 65
SL CV MV MEAN GRADIENT RETROGRADE: 122 MMHG
SL CV PED ECHO LEFT VENTRICLE DIASTOLIC VOLUME (MOD BIPLANE) 2D: 92 ML
SL CV PED ECHO LEFT VENTRICLE SYSTOLIC VOLUME (MOD BIPLANE) 2D: 15 ML
SODIUM SERPL-SCNC: 141 MMOL/L (ref 135–147)
T WAVE AXIS: 51 DEGREES
T WAVE AXIS: 59 DEGREES
THC UR QL: NEGATIVE
VENTRICULAR RATE: 102 BPM
VENTRICULAR RATE: 114 BPM
WBC # BLD AUTO: 13.15 THOUSAND/UL (ref 4.31–10.16)

## 2022-08-17 PROCEDURE — 96361 HYDRATE IV INFUSION ADD-ON: CPT

## 2022-08-17 PROCEDURE — 36415 COLL VENOUS BLD VENIPUNCTURE: CPT | Performed by: EMERGENCY MEDICINE

## 2022-08-17 PROCEDURE — 82077 ASSAY SPEC XCP UR&BREATH IA: CPT | Performed by: EMERGENCY MEDICINE

## 2022-08-17 PROCEDURE — 99205 OFFICE O/P NEW HI 60 MIN: CPT | Performed by: INTERNAL MEDICINE

## 2022-08-17 PROCEDURE — 85025 COMPLETE CBC W/AUTO DIFF WBC: CPT | Performed by: EMERGENCY MEDICINE

## 2022-08-17 PROCEDURE — 93005 ELECTROCARDIOGRAM TRACING: CPT

## 2022-08-17 PROCEDURE — 83605 ASSAY OF LACTIC ACID: CPT | Performed by: EMERGENCY MEDICINE

## 2022-08-17 PROCEDURE — G0425 INPT/ED TELECONSULT30: HCPCS | Performed by: PSYCHIATRY & NEUROLOGY

## 2022-08-17 PROCEDURE — 99285 EMERGENCY DEPT VISIT HI MDM: CPT

## 2022-08-17 PROCEDURE — 70450 CT HEAD/BRAIN W/O DYE: CPT

## 2022-08-17 PROCEDURE — 82550 ASSAY OF CK (CPK): CPT | Performed by: EMERGENCY MEDICINE

## 2022-08-17 PROCEDURE — 93306 TTE W/DOPPLER COMPLETE: CPT

## 2022-08-17 PROCEDURE — 81025 URINE PREGNANCY TEST: CPT | Performed by: FAMILY MEDICINE

## 2022-08-17 PROCEDURE — 80179 DRUG ASSAY SALICYLATE: CPT | Performed by: EMERGENCY MEDICINE

## 2022-08-17 PROCEDURE — 80143 DRUG ASSAY ACETAMINOPHEN: CPT | Performed by: EMERGENCY MEDICINE

## 2022-08-17 PROCEDURE — 85610 PROTHROMBIN TIME: CPT | Performed by: EMERGENCY MEDICINE

## 2022-08-17 PROCEDURE — 99220 PR INITIAL OBSERVATION CARE/DAY 70 MINUTES: CPT | Performed by: FAMILY MEDICINE

## 2022-08-17 PROCEDURE — 99285 EMERGENCY DEPT VISIT HI MDM: CPT | Performed by: EMERGENCY MEDICINE

## 2022-08-17 PROCEDURE — 85730 THROMBOPLASTIN TIME PARTIAL: CPT | Performed by: EMERGENCY MEDICINE

## 2022-08-17 PROCEDURE — 84484 ASSAY OF TROPONIN QUANT: CPT

## 2022-08-17 PROCEDURE — 80307 DRUG TEST PRSMV CHEM ANLYZR: CPT | Performed by: FAMILY MEDICINE

## 2022-08-17 PROCEDURE — 96374 THER/PROPH/DIAG INJ IV PUSH: CPT

## 2022-08-17 PROCEDURE — 82553 CREATINE MB FRACTION: CPT | Performed by: EMERGENCY MEDICINE

## 2022-08-17 PROCEDURE — 84484 ASSAY OF TROPONIN QUANT: CPT | Performed by: EMERGENCY MEDICINE

## 2022-08-17 PROCEDURE — 82140 ASSAY OF AMMONIA: CPT | Performed by: EMERGENCY MEDICINE

## 2022-08-17 PROCEDURE — 93306 TTE W/DOPPLER COMPLETE: CPT | Performed by: INTERNAL MEDICINE

## 2022-08-17 PROCEDURE — 80053 COMPREHEN METABOLIC PANEL: CPT | Performed by: EMERGENCY MEDICINE

## 2022-08-17 PROCEDURE — 93010 ELECTROCARDIOGRAM REPORT: CPT | Performed by: INTERNAL MEDICINE

## 2022-08-17 RX ORDER — CARBAMAZEPINE 200 MG/1
400 TABLET ORAL
Status: DISCONTINUED | OUTPATIENT
Start: 2022-08-17 | End: 2022-08-22 | Stop reason: HOSPADM

## 2022-08-17 RX ORDER — OLANZAPINE 2.5 MG/1
10 TABLET ORAL DAILY
Status: DISCONTINUED | OUTPATIENT
Start: 2022-08-17 | End: 2022-08-22 | Stop reason: HOSPADM

## 2022-08-17 RX ORDER — ENOXAPARIN SODIUM 100 MG/ML
40 INJECTION SUBCUTANEOUS DAILY
Status: DISCONTINUED | OUTPATIENT
Start: 2022-08-17 | End: 2022-08-22 | Stop reason: HOSPADM

## 2022-08-17 RX ORDER — WATER 1000 ML/1000ML
INJECTION, SOLUTION INTRAVENOUS
Status: DISPENSED
Start: 2022-08-17 | End: 2022-08-18

## 2022-08-17 RX ORDER — ZIPRASIDONE MESYLATE 20 MG/ML
10 INJECTION, POWDER, LYOPHILIZED, FOR SOLUTION INTRAMUSCULAR ONCE
Status: DISCONTINUED | OUTPATIENT
Start: 2022-08-17 | End: 2022-08-17

## 2022-08-17 RX ORDER — DICYCLOMINE HCL 20 MG
20 TABLET ORAL 2 TIMES DAILY
Status: DISCONTINUED | OUTPATIENT
Start: 2022-08-17 | End: 2022-08-22 | Stop reason: HOSPADM

## 2022-08-17 RX ORDER — NICOTINE 21 MG/24HR
1 PATCH, TRANSDERMAL 24 HOURS TRANSDERMAL DAILY
Status: DISCONTINUED | OUTPATIENT
Start: 2022-08-17 | End: 2022-08-22 | Stop reason: HOSPADM

## 2022-08-17 RX ORDER — MIDAZOLAM HYDROCHLORIDE 2 MG/2ML
2 INJECTION, SOLUTION INTRAMUSCULAR; INTRAVENOUS ONCE
Status: COMPLETED | OUTPATIENT
Start: 2022-08-17 | End: 2022-08-17

## 2022-08-17 RX ORDER — KETAMINE HYDROCHLORIDE 50 MG/ML
1 INJECTION INTRAMUSCULAR; INTRAVENOUS ONCE
Status: COMPLETED | OUTPATIENT
Start: 2022-08-17 | End: 2022-08-17

## 2022-08-17 RX ORDER — OLANZAPINE 10 MG/1
10 INJECTION, POWDER, LYOPHILIZED, FOR SOLUTION INTRAMUSCULAR EVERY 8 HOURS PRN
Status: DISCONTINUED | OUTPATIENT
Start: 2022-08-17 | End: 2022-08-22 | Stop reason: HOSPADM

## 2022-08-17 RX ADMIN — SODIUM CHLORIDE 1000 ML: 0.9 INJECTION, SOLUTION INTRAVENOUS at 04:59

## 2022-08-17 RX ADMIN — CARBAMAZEPINE 400 MG: 200 TABLET ORAL at 21:51

## 2022-08-17 RX ADMIN — OLANZAPINE 10 MG: 10 TABLET, FILM COATED ORAL at 15:02

## 2022-08-17 RX ADMIN — NICOTINE 1 PATCH: 21 PATCH, EXTENDED RELEASE TRANSDERMAL at 09:49

## 2022-08-17 RX ADMIN — ENOXAPARIN SODIUM 40 MG: 40 INJECTION SUBCUTANEOUS at 09:49

## 2022-08-17 RX ADMIN — MIDAZOLAM 2 MG: 1 INJECTION INTRAMUSCULAR; INTRAVENOUS at 03:20
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
## 2022-08-17 NOTE — RESTRAINT FACE TO FACE
Restraint Face to Face   Margarette Winters 40 y o  female MRN: [id number]  Unit/Bed#: ED 13 Encounter: [id number]      Physical Evaluation:  Not agitated, limits appear well perfused  Patient stares at the ceiling with effectively no responses    Purpose for Restraints/ Seclusion High risk for self harm, High risk for causing significant disruption of treatment environment  and High risk for harm to others  Patient's reaction to the intervention: unchanged  Patient's medical condition:  Delirium, elevated troponin  Patient's Behavioral condition bipolar  Restraints to be Continued but de-escalated

## 2022-08-17 NOTE — ED NOTES
Belongings as follows:  Necklace with small locket  Cell phone  [address] [facility], [address] [facility]  08/17/22 3053

## 2022-08-17 NOTE — ASSESSMENT & PLAN NOTE
Patient had possible episode with psychosis yesterday  She keeps saying she is going [facility] penitentiary  Patient will get a psychiatric consultation and will likely benefit from inpatient psych
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
## 2022-08-17 NOTE — RESTRAINT FACE TO FACE
Restraint Face to Face   Minh Francisca 40 y o  female MRN: [id number]  Unit/Bed#: ED 13 Encounter: [id number]      Physical Evaluation: Agitated     Purpose for Restraints/ Seclusion High risk for self harm, High risk for causing significant disruption of treatment environment , High risk for harm to others and high risk for flight  Patient's reaction to the intervention: improved with medication and physical restraints  Patient's medical condition AMS  Patient's Behavioral condition History of bipolar  Restraints to be Continued

## 2022-08-17 NOTE — LETTER
Section I - General Information    Name of Patient: Narciso Palmer                 : 1980    Medicare #:____________________  Transport Date: 22 (PCS is valid for round trips on this date and for all repetitive trips in the 60-day range as noted below )  Origin: [address]                                                         Destination:___[facility]_____________________________________________  Is the pt's stay covered under Medicare Part A (PPS/DRG)     (_) YES  (X_) NO  Closest appropriate facility? (X_) YES  (_) NO  If no, why is transport to more distant facility required?________________________  If hosp-hosp transfer, describe services needed at 2nd facility not available at 1st facility? __IP MH Tx___________________________  If hospice pt, is this transport related to pt's terminal illness? (_) YES (_) NO Describe____________________________________    Section II - Medical Necessity Questionnaire  Ambulance transportation is medically necessary only if other means of transport are contraindicated or would be potentially harmful to the patient  To meet this requirement, the patient must either be "bed confined" or suffer from a condition such that transport by means other than ambulance is contraindicated by the patient's condition   The following questions must be answered by the medical professional signing below for this form to be valid:    1)  Describe the MEDICAL CONDITION (physical and/or mental) of this patient AT [address] [place] [address] that requires the patient to be transported in an ambulance and why transport by other means is contraindicated by the patient's condition:__Psychosis, Paranoia____________________________________________________________________________    2) Is the patient "bed confined" as defined below?     (_) YES  (X_) NO  To be "be confined" the patient must satisfy all three of the following conditions: (1) unable to get up from bed without Assistance; AND (2) unable to ambulate; AND (3) unable to sit in a chair or wheelchair  3) Can this patient safely be transported by car or wheelchair van (i e , seated during transport without a medical attendant or monitoring)? (_X) YES  (_) NO    4) In addition to completing questions 1-3 above, please check any of the following conditions that apply*:  *Note: supporting documentation for any boxes checked must be maintained in the patient's medical records  (_)Contractures   (_)Non-Healed Fractures  (X_)Patient is confused (_)Patient is comatose (_)Moderate/severe pain on movement (_)Danger to self/others  (_)IV meds/fluids required (_)Patient is combative(_)Need or possible need for restraints (_)DVT requires elevation of lower extremity  (_)Medical attendant required (_)Requires oxygen-unable to self administer (_)Special handling/isolation/infection control precautions required (_)Unable to tolerate seated position for time needed to transport (_)Hemodynamic monitoring required en route (_)Unable to sit in a chair or wheelchair due to decubitus ulcers or other wounds (_)Cardiac monitoring required en route (_)Morbid obesity requires additional personnel/equipment to safely handle patient (_)Orthopedic device (backboard, halo, pins, traction, brace, wedge, etc,) requiring special handling during transport (_)Other(specify)_______________________________________________    Section III - Signature of Physician or Healthcare Professional    I certify that the above information is accurate based on my evaluation of this patient, and that the medical necessity provisions of 42  40(e)(1) are met, requiring that this patient be transported by ambulance  I understand this information will be used by the Centers for Medicare and Medicaid Services (CMS) to support the determination of medical necessity for ambulance services   I represent that I am the beneficiarys attending physician; or an employee of the beneficiarys attending physician, or the hospital or facility where the beneficiary is being treated and from which the beneficiary is being transported; that I have personal knowledge of the beneficiarys condition at the time of transport; and that I meet all Medicare regulations and applicable State licensure laws for the credential indicated  (_) If this box is checked, I also certify that the patient is physically or mentally incapable of signing the ambulance service's claim and that the institution with which I am affiliated has furnished care, services, or assistance to the patient  My signature below is made on behalf of the patient pursuant to 42 CFR §424 36(b)(4)  In accordance with 42 CFR §424 37, the specific reason(s) that the patient is physically or mentally incapable of signing the claim form is as follows: _________________________________________________________________________________________________________      Signature of Physician* or Healthcare Professional______________________________________________________________  Signature Date 08/21/22 (For scheduled repetitive transports, this form is not valid for transports performed more than 60 days after this date)    Printed Name & Credentials of Physician or Healthcare Professional (MD, DO, RN, etc )_A PEGGY Alvarez___________  *Form must be signed by patient's attending physician for scheduled, repetitive transports   For non-repetitive, unscheduled ambulance transports, if unable to obtain the signature of the attending physician, any of the following may sign (choose appropriate option below)  (_) Physician Assistant   (_)  Clinical Nurse Specialist    (_)  Licensed Practical Nurse    (_)    (_)  Nurse Practitioner     (_)  Registered Nurse                (_)                         (X_) Discharge Planner
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
## 2022-08-18 PROCEDURE — 99225 PR SBSQ OBSERVATION CARE/DAY 25 MINUTES: CPT | Performed by: NURSE PRACTITIONER

## 2022-08-18 PROCEDURE — 99215 OFFICE O/P EST HI 40 MIN: CPT | Performed by: INTERNAL MEDICINE

## 2022-08-18 RX ORDER — AMLODIPINE BESYLATE 5 MG/1
5 TABLET ORAL DAILY
Status: DISCONTINUED | OUTPATIENT
Start: 2022-08-18 | End: 2022-08-18

## 2022-08-18 RX ORDER — WATER 1000 ML/1000ML
INJECTION, SOLUTION INTRAVENOUS
Status: COMPLETED
Start: 2022-08-18 | End: 2022-08-18

## 2022-08-18 RX ORDER — LABETALOL HYDROCHLORIDE 5 MG/ML
10 INJECTION, SOLUTION INTRAVENOUS EVERY 6 HOURS PRN
Status: DISCONTINUED | OUTPATIENT
Start: 2022-08-18 | End: 2022-08-19

## 2022-08-18 RX ORDER — AMLODIPINE BESYLATE 10 MG/1
10 TABLET ORAL DAILY
Status: DISCONTINUED | OUTPATIENT
Start: 2022-08-18 | End: 2022-08-19

## 2022-08-18 RX ORDER — CARVEDILOL 12.5 MG/1
12.5 TABLET ORAL 2 TIMES DAILY WITH MEALS
Status: DISCONTINUED | OUTPATIENT
Start: 2022-08-18 | End: 2022-08-19

## 2022-08-18 RX ADMIN — OLANZAPINE 10 MG: 10 INJECTION, POWDER, FOR SOLUTION INTRAMUSCULAR at 19:46

## 2022-08-18 RX ADMIN — WATER 2.1 ML: 1 INJECTION INTRAMUSCULAR; INTRAVENOUS; SUBCUTANEOUS at 10:14

## 2022-08-18 RX ADMIN — NICOTINE 1 PATCH: 21 PATCH, EXTENDED RELEASE TRANSDERMAL at 10:03

## 2022-08-18 RX ADMIN — OLANZAPINE 10 MG: 10 INJECTION, POWDER, FOR SOLUTION INTRAMUSCULAR at 01:33

## 2022-08-18 RX ADMIN — WATER: 1 INJECTION INTRAMUSCULAR; INTRAVENOUS; SUBCUTANEOUS at 19:45

## 2022-08-18 RX ADMIN — OLANZAPINE 10 MG: 10 INJECTION, POWDER, FOR SOLUTION INTRAMUSCULAR at 10:14

## 2022-08-18 NOTE — ASSESSMENT & PLAN NOTE
· Patient presented to the ED with complaints of paranoia  · Hx of bipolar  · Psych consult; appreciate input  · Recommending 302 in setting of patient lacking insight and judgement at this time  · Recommending 1:1 as indicated  · Zyprexa 10 mg daily as a mood stabilizer and for psychosis  · Can give 10 mg Zyprexa IM every 6 hours as needed, but not to exceed 30 mg in 24 hrs

## 2022-08-18 NOTE — PLAN OF CARE
Problem: MOBILITY - ADULT  Goal: Maintain or return to baseline ADL function  Description: INTERVENTIONS:  -  Assess patient's ability to carry out ADLs; assess patient's baseline for ADL function and identify physical deficits which impact ability to perform ADLs (bathing, care of mouth/teeth, toileting, grooming, dressing, etc )  - Assess/evaluate cause of self-care deficits   - Assess range of motion  - Assess patient's mobility; develop plan if impaired  - Assess patient's need for assistive devices and provide as appropriate  - Encourage maximum independence but intervene and supervise when necessary  - Involve family in performance of ADLs  - Assess for home care needs following discharge   - Consider OT consult to assist with ADL evaluation and planning for discharge  - Provide patient education as appropriate  Outcome: Progressing     Problem: Potential for Falls  Goal: Patient will remain free of falls  Description: INTERVENTIONS:  - Educate patient/family on patient safety including physical limitations  - Instruct patient to call for assistance with activity   - Consult OT/PT to assist with strengthening/mobility   - Keep Call bell within reach  - Keep bed low and locked with side rails adjusted as appropriate  - Keep care items and personal belongings within reach  - Initiate and maintain comfort rounds  - Make Fall Risk Sign visible to staff  - Offer Toileting every 2 Hours, in advance of need  - Initiate/Maintain bed alarm  - Obtain necessary fall risk management equipment: call bell in reach, chair alarm bed alarm       - Apply yellow socks and bracelet for high fall risk patients  - Consider moving patient to room near nurses station  Outcome: Progressing

## 2022-08-18 NOTE — NURSING NOTE
Patient became highly agitated this AM and security was called  Patient was redirected, although refused morning medications and became agitated once again  IM zyprexa order administered  SLIM made aware  Patient sitting at the edge of bed, no needs at this time

## 2022-08-18 NOTE — ASSESSMENT & PLAN NOTE
· History of hypertension; uncontrolled on admission  · Had taken Amlodipine in the past, 10 mg daily; continue  · Initiated on Coreg this morning; however, patient is non-compliant taking medications this morning  · Will start on 10 mg IV Labetalol every 6 hrs PRN for SBP >170  · Monitor closely

## 2022-08-18 NOTE — NURSING NOTE
Pt was awake and agitated for a majority of the night  She consumed 2 cups of soda and a cup of water but did not consume any solid foods  She received 2 phone calls during the night - a man introducing himself as her fiance called and she did not want to speak to him   And she refused the phone  He was asking about her condition and who he could talk to  I told him I was unable to provide any information w/o her consent  He understood but said he will call back in the morning  Another man identified as her father , she agreed to talk to him and said hello but only listened after that, she remained non-verbal   A few times she walked out of her room into the [place]  While she was in the [place] she was visibly upset - tears in her eyes arms crossed She also had tachy reading on the tele/  Occasionally she would say- she did not belong here she needed to go to correction   When asked why she felt that way - she would go silent  Pt had one episode of incontinence in the hallway - She was encouraged to go back into her room for clothing change  Pt was hesitant but cooperative  She did receive PRN Zypexa for her agitation and after some time she did relax  At this time she is laying on her bed with her eyes closed  No signs of distress or discomfort noted  Constant observation continues
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
## 2022-08-18 NOTE — ASSESSMENT & PLAN NOTE
· Present on admission  · Initial trop 78, 2 hr delta 420, 4 hr delta 629  · Cardiology consult, appreciate input  · Echo (8/17/22): Left ventricular cavity size is normal  Wall thickness is normal  The left ventricular ejection fraction is 65% by visual estimation  Systolic function is normal   Wall motion is normal   · Elevated troponin levels likely secondary to hypertension/tachycardia

## 2022-08-18 NOTE — NURSING NOTE
Patient is very agitated pacing in room in street clothing saying she is going home  She does not answer any question just blank stare  Security called for assistance, zyprexa given, and on call MD made aware  Patient continued on 1:1 Observation for safety

## 2022-08-18 NOTE — PROGRESS NOTES
[address] [facility]  Progress Note - Carter Murry 1980, 40 y o  female MRN: [id number]  Unit/Bed#: -Ty Encounter: [id number]  Primary Care Provider: No primary care provider on file  Date and time admitted to hospital: 8/17/2022  3:09 AM    * Bipolar affective disorder, currently manic, mild ([address] )  Assessment & Plan  · Patient presented to the ED with complaints of paranoia  · Hx of bipolar  · Psych consult; appreciate input  · Recommending 302 in setting of patient lacking insight and judgement at this time  · Recommending 1:1 as indicated  · Zyprexa 10 mg daily as a mood stabilizer and for psychosis  · Can give 10 mg Zyprexa IM every 6 hours as needed, but not to exceed 30 mg in 24 hrs  Elevated troponin  Assessment & Plan  · Present on admission  · Initial trop 78, 2 hr delta 420, 4 hr delta 629  · Cardiology consult, appreciate input  · Echo (8/17/22): Left ventricular cavity size is normal  Wall thickness is normal  The left ventricular ejection fraction is 65% by visual estimation  Systolic function is normal   Wall motion is normal   · Elevated troponin levels likely secondary to hypertension/tachycardia  Hypertension  Assessment & Plan  · History of hypertension; uncontrolled on admission  · Had taken Amlodipine in the past, 10 mg daily; continue  · Initiated on Coreg this morning; however, patient is non-compliant taking medications this morning  · Will start on 10 mg IV Labetalol every 6 hrs PRN for SBP >170  · Monitor closely  VTE Pharmacologic Prophylaxis: VTE Score: 3 Moderate Risk (Score 3-4) - Pharmacological DVT Prophylaxis Ordered: enoxaparin (Lovenox)  Patient Centered Rounds: I performed bedside rounds with nursing staff today  Discussions with Specialists or Other Care Team Provider: Case Management    Education and Discussions with Family / Patient: Patient declined call to   Time Spent for Care: 20 minutes   More than 50% of total time spent on counseling and coordination of care as described above  Current Length of Stay: 0 day(s)  Current Patient Status: Observation   Certification Statement: The patient will continue to require additional inpatient hospital stay due to ongoing treatment in setting of uncontrolled hypertension  Discharge Plan: Anticipate discharge tomorrow to inpatient psych  Code Status: Level 1 - Full Code    Subjective:   Patient seen pacing around the room  Patient speaking nonsense and appears paranoid  Does not appear to be in any pain  Is not in acute distress  Objective:     Vitals:   Temp (24hrs), Av 5 °F (36 9 °C), Min:98 3 °F (36 8 °C), Max:98 7 °F (37 1 °C)    Temp:  [98 3 °F (36 8 °C)-98 7 °F (37 1 °C)] 98 7 °F (37 1 °C)  HR:  [] 92  Resp:  [16-18] 17  BP: (156-177)/() 156/106  SpO2:  [93 %-98 %] 96 %  Body mass index is 43 9 kg/m²  Input and Output Summary (last 24 hours): Intake/Output Summary (Last 24 hours) at 2022 1105  Last data filed at 2022 0711  Gross per 24 hour   Intake 110 ml   Output --   Net 110 ml       Physical Exam:   Physical Exam  Vitals and nursing note reviewed  Constitutional:       General: She is not in acute distress  Appearance: She is obese  She is ill-appearing  Cardiovascular:      Rate and Rhythm: Regular rhythm  Tachycardia present  Pulses: Normal pulses  Pulmonary:      Effort: Pulmonary effort is normal    Musculoskeletal:         General: Normal range of motion  Skin:     General: Skin is warm and dry  Neurological:      Mental Status: She is alert and oriented to person, place, and time  Psychiatric:         Attention and Perception: She is inattentive  Mood and Affect: Affect is labile  Speech: Speech is delayed  Thought Content: Thought content is paranoid  Judgment: Judgment is impulsive            Additional Data:     Labs:  Results from last 7 days   Lab Units 08/17/22  0328   WBC Thousand/uL 13 15*   HEMOGLOBIN g/dL 14 8   HEMATOCRIT % 44 5   PLATELETS Thousands/uL 295   NEUTROS PCT % 79*   LYMPHS PCT % 13*   MONOS PCT % 8   EOS PCT % 0     Results from last 7 days   Lab Units 08/17/22  0328   SODIUM mmol/L 141   POTASSIUM mmol/L 3 9   CHLORIDE mmol/L 107   CO2 mmol/L 22   BUN mg/dL 18   CREATININE mg/dL 1 00   ANION GAP mmol/L 12   CALCIUM mg/dL 9 1   ALBUMIN g/dL 4 2   TOTAL BILIRUBIN mg/dL 0 19*   ALK PHOS U/L 99   ALT U/L 34   AST U/L 19   GLUCOSE RANDOM mg/dL 122     Results from last 7 days   Lab Units 08/17/22  0328   INR  1 06             Results from last 7 days   Lab Units 08/17/22  0542 08/17/22  0328   LACTIC ACID mmol/L 0 8 3 6*       Lines/Drains:  Invasive Devices  Report    Peripheral Intravenous Line  Duration           Peripheral IV 08/17/22 Left Antecubital 1 day                  Telemetry:  Telemetry Orders (From admission, onward)             48 Hour Telemetry Monitoring  Continuous x 48 hours        References:    Telemetry Guidelines   Question:  Reason for 48 Hour Telemetry  Answer:  Acute MI, chest pain - R/O MI, or unstable angina                 Telemetry Reviewed: Sinus Tachycardia  Indication for Continued Telemetry Use: No indication for continued use  Will discontinue  Imaging: No pertinent imaging reviewed      Recent Cultures (last 7 days):         Last 24 Hours Medication List:   Current Facility-Administered Medications   Medication Dose Route Frequency Provider Last Rate    amLODIPine  10 mg Oral Daily JONATHON Rajput      carBAMazepine  400 mg Oral HS Raul Olivera MD      carvedilol  12 5 mg Oral BID With Meals JONATHON Chi      dicyclomine  20 mg Oral BID Raul Olivera MD      enoxaparin  40 mg Subcutaneous Daily Raul Olivera MD      labetalol  10 mg Intravenous Q6H PRN JONATHON Rajput      nicotine  1 patch Transdermal Daily Raul Olivera MD      OLANZapine  10 mg Intramuscular Q8H PRN Raul Rich MD      OLANZapine  10 mg Oral Daily Raul Rich MD          Today, Patient Was Seen By: JONATHON Ford    **Please Note: This note may have been constructed using a voice recognition system  **

## 2022-08-18 NOTE — PROGRESS NOTES
General Cardiology   Progress Note   Bhumika Horton 40 y o  female MRN: [id number]  Unit/Bed#: -01 Encounter: [id number]    Assessment/Plan:    1  Ischemic myocardial injury  -secondary to hypertensive urgency and tachycardia  -TTE performed and revealed normal systolic function with an EF of 65% and no valvular abnormalities or pericardial effusion noted  -continue to monitor telemetry    2  Hypertensive urgency  -blood pressure is still significantly elevated  -add carvedilol 12 5 mg b i d   -continue amlodipine  -continue monitor blood pressures closely    3  Bipolar disorder, currently manic  -psychiatry following  -care per primary team      Subjective:   Patient seen and examined  No significant events since the last encounter  REVIEW OF SYSTEMS:  Unobtainable secondary to patient mental status, nonverbal     Objective:   Vitals:  Vitals:    08/18/22 0711   BP: (!) 156/106   Pulse: 92   Resp: 17   Temp: 98 7 °F (37 1 °C)   SpO2: 96%       Body mass index is 43 9 kg/m²  Systolic (68WRC), MBP:610 , Min:153 , RDQ:819     Diastolic (03ZQI), MSE:03, Min:68, Max:107      Intake/Output Summary (Last 24 hours) at 8/18/2022 0924  Last data filed at 8/18/2022 0711  Gross per 24 hour   Intake 110 ml   Output --   Net 110 ml     Weight (last 2 days)     Date/Time Weight    08/17/22 1330 123 (272)    08/17/22 0312 123 (272)          Telemetry Review: No significant arrhythmias seen on telemetry review  Sinus tachycardia in the 110s to 130s      PHYSICAL EXAMS  General:  Patient is not in acute distress, sitting in the bed comfortably, awake  Head: Normocephalic, Atraumatic     HEENT: White sclera, pink conjunctiva  Neck:  Supple, no neck vein distention  Respiratory: clear to auscultation   Cardiovascular: S1-S2 normal, no murmurs, thrills, gallops, rubs, regular rhythm  GI:  Abdomen soft, nontender, non-distended  Extremities: No edema, normal pulses  Integument:  No skin rashes or ulceration  Neurologic: Patient is awake alert, not responding to command    LABORATORY RESULTS:  Results from last 7 days   Lab Units 08/17/22  0328   CK TOTAL U/L 155   CK MB INDEX % <1 0     CBC with diff:   Results from last 7 days   Lab Units 08/17/22  0328   WBC Thousand/uL 13 15*   HEMOGLOBIN g/dL 14 8   HEMATOCRIT % 44 5   MCV fL 93   PLATELETS Thousands/uL 295   MCH pg 30 8   MCHC g/dL 33 3   RDW % 13 2   MPV fL 11 1   NRBC AUTO /100 WBCs 0       CMP:  Results from last 7 days   Lab Units 08/17/22  0328   POTASSIUM mmol/L 3 9   CHLORIDE mmol/L 107   CO2 mmol/L 22   BUN mg/dL 18   CREATININE mg/dL 1 00   CALCIUM mg/dL 9 1   AST U/L 19   ALT U/L 34   ALK PHOS U/L 99   EGFR ml/min/1 73sq m 70       BMP:  Results from last 7 days   Lab Units 08/17/22  0328   POTASSIUM mmol/L 3 9   CHLORIDE mmol/L 107   CO2 mmol/L 22   BUN mg/dL 18   CREATININE mg/dL 1 00   CALCIUM mg/dL 9 1                          Results from last 7 days   Lab Units 08/17/22  0328   INR  1 06       Lipid Profile:   No results found for: CHOL  Lab Results   Component Value Date    HDL 59 06/15/2021    HDL 65 (H) 12/22/2017    HDL 41 12/01/2017     Lab Results   Component Value Date    LDLCALC 97 06/15/2021    LDLCALC 131 (H) 12/22/2017    LDLCALC 93 12/01/2017     Lab Results   Component Value Date    TRIG 55 06/15/2021    TRIG 84 12/22/2017    TRIG 109 12/01/2017       Cardiac testing:   No results found for this or any previous visit  No results found for this or any previous visit  No results found for this or any previous visit  No valid procedures specified  No results found for this or any previous visit        Meds/Allergies   all current active meds have been reviewed  Medications Prior to Admission   Medication    ammonium lactate (LAC-HYDRIN) 12 % cream    betamethasone dipropionate (DIPROSONE) 0 05 % ointment    carBAMazepine (TEGretol) 200 mg tablet    dicyclomine (BENTYL) 20 mg tablet    diphenhydrAMINE (BENADRYL) 25 mg tablet    docusate sodium (COLACE) 100 mg capsule    fexofenadine (ALLEGRA) 60 MG tablet    gabapentin (NEURONTIN) 300 mg capsule    hydrOXYzine HCL (ATARAX) 50 mg tablet    ibuprofen (MOTRIN) 800 mg tablet    naproxen (NAPROSYN) 500 mg tablet    nicotine polacrilex (NICORETTE) 2 mg gum    OLANZapine (ZyPREXA) 15 mg tablet    ondansetron (ZOFRAN-ODT) 4 mg disintegrating tablet    QUEtiapine (SEROquel) 200 mg tablet    triamcinolone (KENALOG) 0 1 % cream              ** Please Note: Dragon 360 Dictation voice to text software may have been used in the creation of this document   **

## 2022-08-19 LAB
ETHANOL SERPL-MCNC: <3 MG/DL (ref 0–3)
FLUAV RNA RESP QL NAA+PROBE: NEGATIVE
FLUBV RNA RESP QL NAA+PROBE: NEGATIVE
RSV RNA RESP QL NAA+PROBE: NEGATIVE
SARS-COV-2 RNA RESP QL NAA+PROBE: NEGATIVE

## 2022-08-19 PROCEDURE — 0241U HB NFCT DS VIR RESP RNA 4 TRGT: CPT | Performed by: NURSE PRACTITIONER

## 2022-08-19 PROCEDURE — 82077 ASSAY SPEC XCP UR&BREATH IA: CPT | Performed by: NURSE PRACTITIONER

## 2022-08-19 PROCEDURE — G0425 INPT/ED TELECONSULT30: HCPCS | Performed by: GENERAL PRACTICE

## 2022-08-19 PROCEDURE — 99232 SBSQ HOSP IP/OBS MODERATE 35: CPT | Performed by: NURSE PRACTITIONER

## 2022-08-19 RX ORDER — WATER 1000 ML/1000ML
INJECTION, SOLUTION INTRAVENOUS
Status: COMPLETED
Start: 2022-08-19 | End: 2022-08-19

## 2022-08-19 RX ORDER — LABETALOL HYDROCHLORIDE 5 MG/ML
10 INJECTION, SOLUTION INTRAVENOUS EVERY 6 HOURS
Status: DISCONTINUED | OUTPATIENT
Start: 2022-08-19 | End: 2022-08-22 | Stop reason: HOSPADM

## 2022-08-19 RX ADMIN — LABETALOL HYDROCHLORIDE 10 MG: 5 INJECTION, SOLUTION INTRAVENOUS at 12:04

## 2022-08-19 RX ADMIN — OLANZAPINE 10 MG: 10 INJECTION, POWDER, FOR SOLUTION INTRAMUSCULAR at 23:45

## 2022-08-19 RX ADMIN — LABETALOL HYDROCHLORIDE 10 MG: 5 INJECTION, SOLUTION INTRAVENOUS at 17:43

## 2022-08-19 RX ADMIN — WATER 10 ML: 1 INJECTION INTRAMUSCULAR; INTRAVENOUS; SUBCUTANEOUS at 23:45

## 2022-08-19 RX ADMIN — LABETALOL HYDROCHLORIDE 10 MG: 5 INJECTION, SOLUTION INTRAVENOUS at 21:38

## 2022-08-19 RX ADMIN — DICYCLOMINE HYDROCHLORIDE 20 MG: 20 TABLET ORAL at 17:43

## 2022-08-19 RX ADMIN — NICOTINE 1 PATCH: 21 PATCH, EXTENDED RELEASE TRANSDERMAL at 08:44

## 2022-08-19 NOTE — PROGRESS NOTES
[address] [facility]  Progress Note - Servando Flank 1980, 40 y o  female MRN: [id number]  Unit/Bed#: -01 Encounter: [id number]  Primary Care Provider: No primary care provider on file  Date and time admitted to hospital: 8/17/2022  3:09 AM    * Bipolar affective disorder, currently manic, mild ([address] )  Assessment & Plan  · Patient presented to the ED with complaints of paranoia  · Hx of bipolar  · Psych consult; appreciate input  · Recommending 302 in setting of patient lacking insight and judgement at this time  · Recommending 1:1 as indicated  · Zyprexa 10 mg daily as a mood stabilizer and for psychosis  · Can give 10 mg Zyprexa IM every 6 hours as needed, but not to exceed 30 mg in 24 hrs  · Psych re-consult placed today as patient will not take any oral medications and mood does not seem to be improving    Elevated troponin  Assessment & Plan  · Present on admission  · Initial trop 78, 2 hr delta 420, 4 hr delta 629  · Cardiology consult, appreciate input  · Echo (8/17/22): Left ventricular cavity size is normal  Wall thickness is normal  The left ventricular ejection fraction is 65% by visual estimation  Systolic function is normal   Wall motion is normal   · Elevated troponin levels likely secondary to hypertension/tachycardia  Hypertension  Assessment & Plan  · History of hypertension; uncontrolled on admission  · Unfortunately patient is not taking her oral medications at this time  · Amlodipine and Coreg discontinued until patient is agreeable to taking orals  · Will continue 10 mg of IV labetalol every 6 hours around the clock  · Will start patient on oral medications as soon as she is cooperative  VTE Pharmacologic Prophylaxis: VTE Score: 3 Moderate Risk (Score 3-4) - Pharmacological DVT Prophylaxis Ordered: enoxaparin (Lovenox)  Patient Centered Rounds: I performed bedside rounds with nursing staff today    Discussions with Specialists or Other Care Team Provider: Case management    Education and Discussions with Family / Patient: Patient declined call to   Time Spent for Care: 20 minutes  More than 50% of total time spent on counseling and coordination of care as described above  Current Length of Stay: 0 day(s)  Current Patient Status: Observation   Certification Statement: The patient will continue to require additional inpatient hospital stay due to Ongoing treatment in setting of hypertension and need for 302  Discharge Plan: Anticipate discharge in 24-48 hrs to inpatient psych  Code Status: Level 1 - Full Code    Subjective:   Patient seen pacing the room  Still uncooperative and inappropriate with her speech  No acute distress  Objective:     Vitals:   No data recorded  HR:  [113-114] 114  BP: (137-163)/() 137/98  SpO2:  [96 %] 96 %  Body mass index is 43 9 kg/m²  Input and Output Summary (last 24 hours): Intake/Output Summary (Last 24 hours) at 8/19/2022 1127  Last data filed at 8/18/2022 1700  Gross per 24 hour   Intake 120 ml   Output --   Net 120 ml       Physical Exam:   Physical Exam  Vitals and nursing note reviewed  Constitutional:       General: She is not in acute distress  Appearance: She is obese  She is ill-appearing  Cardiovascular:      Rate and Rhythm: Tachycardia present  Pulses: Normal pulses  Pulmonary:      Effort: Pulmonary effort is normal    Abdominal:      General: Bowel sounds are normal    Musculoskeletal:         General: Normal range of motion  Neurological:      Mental Status: She is alert and oriented to person, place, and time  Psychiatric:         Speech: Speech is delayed  Behavior: Behavior is withdrawn  Thought Content: Thought content is paranoid  Judgment: Judgment is impulsive and inappropriate            Additional Data:     Labs:  Results from last 7 days   Lab Units 08/17/22  0328   WBC Thousand/uL 13 15*   HEMOGLOBIN g/dL 14 8   HEMATOCRIT % 44 5   PLATELETS Thousands/uL 295   NEUTROS PCT % 79*   LYMPHS PCT % 13*   MONOS PCT % 8   EOS PCT % 0     Results from last 7 days   Lab Units 08/17/22  0328   SODIUM mmol/L 141   POTASSIUM mmol/L 3 9   CHLORIDE mmol/L 107   CO2 mmol/L 22   BUN mg/dL 18   CREATININE mg/dL 1 00   ANION GAP mmol/L 12   CALCIUM mg/dL 9 1   ALBUMIN g/dL 4 2   TOTAL BILIRUBIN mg/dL 0 19*   ALK PHOS U/L 99   ALT U/L 34   AST U/L 19   GLUCOSE RANDOM mg/dL 122     Results from last 7 days   Lab Units 08/17/22  0328   INR  1 06             Results from last 7 days   Lab Units 08/17/22  0542 08/17/22  0328   LACTIC ACID mmol/L 0 8 3 6*       Lines/Drains:  Invasive Devices  Report    Peripheral Intravenous Line  Duration           Peripheral IV 08/17/22 Left Antecubital 2 days                  Telemetry:  Telemetry Orders (From admission, onward)             48 Hour Telemetry Monitoring  Continuous x 48 hours        References:    Telemetry Guidelines   Question:  Reason for 48 Hour Telemetry  Answer:  Acute MI, chest pain - R/O MI, or unstable angina                 Telemetry Reviewed: Sinus Tachycardia  Indication for Continued Telemetry Use: No indication for continued use  Will discontinue  Imaging: No pertinent imaging reviewed      Recent Cultures (last 7 days):         Last 24 Hours Medication List:   Current Facility-Administered Medications   Medication Dose Route Frequency Provider Last Rate    carBAMazepine  400 mg Oral HS Raul Gracia MD      dicyclomine  20 mg Oral BID Raul Gracia MD      enoxaparin  40 mg Subcutaneous Daily Raul Gracia MD      labetalol  10 mg Intravenous Q6H JONATHON Sullivan      nicotine  1 patch Transdermal Daily Raul Gracia MD      OLANZapine  10 mg Intramuscular Q8H PRN Raul Gracia MD      OLANZapine  10 mg Oral Daily Raul Gracia MD          Today, Patient Was Seen By: JONATHON Sullivan    **Please Note: This note may have been constructed using a voice recognition system  **

## 2022-08-19 NOTE — MALNUTRITION/BMI
This medical record reflects one or more clinical indicators suggestive of morbid obesity  BMI Findings:  Adult BMI Classifications: Morbid Obesity 40-44 9        Body mass index is 43 9 kg/m²  See Nutrition note dated 8/19/2022 for additional details  Completed nutrition assessment is viewable in the nutrition documentation

## 2022-08-19 NOTE — PLAN OF CARE
Problem: COPING  Goal: Pt/Family able to verbalize concerns and demonstrate effective coping strategies  Description: INTERVENTIONS:  - Assist patient/family to identify coping skills, available support systems and cultural and spiritual values  - Provide emotional support, including active listening and acknowledgement of concerns of patient and caregivers  - Reduce environmental stimuli, as able  - Provide patient education  - Assess for spiritual pain/suffering and initiate spiritual care, including notification of Pastoral Care or jazmin based community as needed  - Assess effectiveness of coping strategies  Outcome: Progressing  Goal: Will report anxiety at manageable levels  Description: INTERVENTIONS:  - Administer medication as ordered  - Teach and encourage coping skills  - Provide emotional support  - Assess patient/family for anxiety and ability to cope  Outcome: Progressing     Problem: DECISION MAKING  Goal: Pt/Family able to effectively weigh alternatives and participate in decision making related to treatment and care  Description: INTERVENTIONS:  - Identify decision maker  - Determine when there are differences among patient's view, family's view, and healthcare provider's view of patient condition and care goals  - Facilitate patient/family articulation of goals for care  - Help patient/family identify pros/cons of alternative solutions  - Provide information as requested by patient/family  - Respect patient/family rights related to privacy and sharing information   - Serve as a liaison between patient, family and health care team  - Initiate consults as appropriate (Ethics Team, Palliative Care, Family Care Conference, etc )  Outcome: Progressing     Problem: CONFUSION/THOUGHT DISTURBANCE  Goal: Thought disturbances (confusion, delirium, depression, dementia or psychosis) are managed to maintain or return to baseline mental status and functional level  Description: INTERVENTIONS:  - Assess for possible contributors to  thought disturbance, including but not limited to medications, infection, impaired vision or hearing, underlying metabolic abnormalities, dehydration, respiratory compromise,  psychiatric diagnoses and notify attending PHYSICAN/AP  - Monitor and intervene to maintain adequate nutrition, hydration, elimination, sleep and activity  - Decrease environmental stimuli, including noise as appropriate  - Provide frequent contacts to provide refocusing, direction and reassurance as needed  Approach patient calmly with eye contact and at their level  - [place] high risk fall precautions, aspiration precautions and other safety measures, as indicated  - If delirium suspected, notify physician/AP of change in condition and request immediate in-person evaluation  - Pursue consults as appropriate including Geriatric (campus dependent), OT for cognitive evaluation/activity planning, psychiatric, pastoral care, etc   Outcome: Progressing     Problem: BEHAVIOR  Goal: Pt/Family maintain appropriate behavior and adhere to behavioral management agreement, if implemented  Description: INTERVENTIONS:  - Assess the family dynamic   - Encourage verbalization of thoughts and concerns in a socially appropriate manner  - Assess patient/family's coping skills and non-compliant behavior (including use of illegal substances)  - Utilize positive, consistent limit setting strategies supporting safety of patient, staff and others  - Initiate consult with Case Management, Spiritual Care or other ancillary services as appropriate  - If a patient's/visitor's behavior jeopardizes the safety of the patient, staff, or others, refer to organization procedure     - Notify Security of behavior or suspected illegal substances which indicate the need for search of the patient and/or belongings  - Encourage participation in the decision making process about a behavioral management agreement; implement if patient meets criteria  Outcome: Progressing     Problem: Depression - IP adult  Goal: Effects of depression will be minimized  Description: INTERVENTIONS:  - Assess impact of patient's symptoms on level of functioning, self-care needs and offer support as indicated  - Assess patient/family knowledge of depression, impact on illness and need for teaching  - Provide emotional support, presence and reassurance  - Assess for possible suicidal thoughts, ideation or self-harm  If patient expresses suicidal thoughts or statements do not leave alone, notify physician/AP immediately, initiate Suicide Precautions, and determine need for continual observation   - Initiate consults and referrals as appropriate (a mental health professional, Spiritual Care)  - Administer medication as ordered  Outcome: Progressing     Problem: SELF HARM  Goal: Effect of psychiatric condition will be minimized and patient will be protected from self harm  Description: INTERVENTIONS:  - Assess impact of patient's symptoms on level of functioning, self-care needs and offer support as indicated  - Assess patient/family knowledge of depression, impact on illness and need for teaching  - Provide emotional support, presence and reassurance  - Assess for possible suicidal thoughts, ideation or self-harm  If patient expresses suicidal thoughts or statements do not leave alone, notify physician/AP immediately, initiate suicide precautions, and determine need for continual observation    - initiate consults and referrals as appropriate (a mental health professional, Spiritual Care  Outcome: Progressing
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
## 2022-08-19 NOTE — CASE MANAGEMENT
Case Management Assessment & Discharge Planning Note    Patient name Servando Nunez  Location [address] [id number] MRN [id number]  : 1980 Date 2022       Current Admission Date: 2022  Current Admission Diagnosis:Bipolar affective disorder, currently manic, mild ([facility] )   Patient Active Problem List    Diagnosis Date Noted    Elevated troponin 2022    Hypertension 2021    Bipolar affective disorder, currently manic, mild ([facility] ) 2021    Nasal congestion 2021    Back pain 2021    Cracking skin 2021    Cannabis abuse 2017    Bipolar I disorder, most recent episode mixed, severe with psychotic features ([facility] ) 2017    Suicidal thoughts 2017      LOS (days): 0  Geometric Mean LOS (GMLOS) (days):   Days to GMLOS:     OBJECTIVE:       Current admission status: Observation    Preferred Pharmacy:   [address] [place] [address] [id number] R R 1 ([phone] [id number] [phone] [id number])  [address] [place] [address]  Phone: [phone] Fax: [phone]    Primary Care Provider: No primary care provider on file  Primary Insurance: [employer] MEDICARE REP  Secondary Insurance: [address]    ASSESSMENT:  Luis Giron Proxies    There are no active Health Care Proxies on file         Advance Directives  Does patient have a [address]?: Yes  Does patient have Advance Directives?: No  Was patient offered paperwork?: No    Readmission Root Cause  30 Day Readmission: No    Patient Information  Admitted from[de-identified] Home  Mental Status: Confused  During Assessment patient was accompanied by: Not accompanied during assessment  Assessment information provided by[de-identified] Spouse  Primary Caregiver: Self  Support Systems: Self, Spouse/significant other, Family members  [place] of Residence: [place] [id number] do you live in?: [address] [place] [address]  Type of Current Residence: [facility]  Living Arrangements: Lives w/ Spouse/significant other, Lives w/ Parent(s)  Is patient a ?: No    Activities of Daily Living Prior to Admission  Functional Status: Independent  Completes ADLs independently?: Yes  Ambulates independently?: Yes  Does patient use assisted devices?: No  Does patient currently own DME?: No  Does patient have a history of Outpatient Therapy (PT/OT)?: No  Does the patient have a history of Short-Term Rehab?: No  Does patient have a history of HHC?: No  Does patient currently have [facility]?: No    Patient Information Continued  Does patient have prescription coverage?: Yes  Does patient receive dialysis treatments?: No  Does patient have a history of substance abuse?: No  Does patient have a history of Mental Health Diagnosis?: Yes (Bipolar Effective disorder currently in mild manic state)  Is patient receiving treatment for mental health?: Yes  Has patient received inpatient treatment related to mental health in the last 2 years?: Yes    Means of Transportation  Means of Transport to Appts[de-identified] Family transport    DISCHARGE DETAILS:    Discharge planning discussed with[de-identified] The patient crissmary Walsh  Freedom of Choice: Yes  Comments - Freedom of Choice: CM met with the patient at the bedside to complete the initial assessment  However, the patient is pacing in the room not able to answer assessment questions  A 1:1 sitter is present in the patient's room  The patient was admitted to the hospital for under OBS for [place] Effective disorder currently in mild manic state  The patients radha Walsh notified to complete the assessment  Per Nissa Walsh, when the patient is not in a manic state she is oriented x4 able to make needs known  The patients demographic sheet was verified  Per Nissa Walsh, the patient lives between himself and her parents house and able to complete her ADLs and IADLs before admission  The patient smokes 1 pack of cigarettes lasting 2 days  Nissa Walsh was advise that the medical team has recommended in patient psych and the marie multani's   Nissa Walsh stated the family would prefer her to stay local and be admitted to [facility] in [place], [place]  The patients discharge disposition will be to admi to in patient psych  CM will continue to follow  CM contacted family/caregiver?: Yes  Were Treatment Team discharge recommendations reviewed with patient/caregiver?: Yes  Did patient/caregiver verbalize understanding of patient care needs?: Yes  Were patient/caregiver advised of the risks associated with not following Treatment Team discharge recommendations?: Yes    Contacts  Patient Contacts: Geeta Phipps  Relationship to Patient[de-identified] Other (Comment)  Contact Method: Phone  Phone Number: [phone]  Reason/Outcome: Continuity of Care, Emergency [address]         Is the patient interested [place] [address] at discharge?: No    DME Referral Provided  Referral made for DME?: No    Other Referral/Resources/Interventions Provided:  Interventions: Psychiatrist  Referral Comments: CM will submit a psych referral to [facility]    Treatment Team Recommendation: Inpatient [address]  Discharge Destination Plan[de-identified] Inpatient [address] vaccines update @ 1009     Per the patients crissmary Nissa Daughters, the patient only received 1 COVID dose, had a reaction to the injection, and did not received a second dose

## 2022-08-19 NOTE — ASSESSMENT & PLAN NOTE
· History of hypertension; uncontrolled on admission  · Unfortunately patient is not taking her oral medications at this time  · Amlodipine and Coreg discontinued until patient is agreeable to taking orals  · Will continue 10 mg of IV labetalol every 6 hours around the clock  · Will start patient on oral medications as soon as she is cooperative

## 2022-08-19 NOTE — ASSESSMENT & PLAN NOTE
· Patient presented to the ED with complaints of paranoia  · Hx of bipolar  · Psych consult; appreciate input  · Recommending 302 in setting of patient lacking insight and judgement at this time  · Recommending 1:1 as indicated  · Zyprexa 10 mg daily as a mood stabilizer and for psychosis  · Can give 10 mg Zyprexa IM every 6 hours as needed, but not to exceed 30 mg in 24 hrs    · Psych re-consult placed today as patient will not take any oral medications and mood does not seem to be improving

## 2022-08-19 NOTE — UTILIZATION REVIEW
Continued Stay Review    Date: 8/19                          OBS order 8/17 @ 0608 converted to IP on 8/19 @  for continued psych consult     Level of Care Med Surg   Estimated length of stay More than 2 Midnights   Certification I certify that inpatient services are medically necessary for this patient for a duration of greater than two midnights  See H&P and MD Progress Notes for additional information about the patient's course of treatment  08/19/22 1305  Inpatient Admission  Once         08/19/22 1305   08/17/22 0618  Place in Observation  Once         08/17/22 0617       Current Patient Class: IP  Current Level of Care: tele     HPI:42 y o  female initially admitted on  8/17    Assessment/Plan: psych re consulted today , as pt does not take any po meds and mood does not seem to be improving   Pt is delayed , withdrawn , paranoid, impulsive and inappropriate        Vital Signs:   08/19/22 11:59:43 -- -- -- 155/110 Abnormal  125 -- -- --   08/19/22 07:24:01 -- 114 Abnormal  -- -- -- 96 % -- --   08/19/22 07:16:02 -- 113 Abnormal  -- 137/98 111 -- --          Pertinent Labs/Diagnostic Results:       Results from last 7 days   Lab Units 08/17/22  0328   WBC Thousand/uL 13 15*   HEMOGLOBIN g/dL 14 8   HEMATOCRIT % 44 5   PLATELETS Thousands/uL 295   NEUTROS ABS Thousands/µL 10 32*     Results from last 7 days   Lab Units 08/17/22  0328   SODIUM mmol/L 141   POTASSIUM mmol/L 3 9   CHLORIDE mmol/L 107   CO2 mmol/L 22   ANION GAP mmol/L 12   BUN mg/dL 18   CREATININE mg/dL 1 00   EGFR ml/min/1 73sq m 70   CALCIUM mg/dL 9 1     Results from last 7 days   Lab Units 08/17/22  0328   AST U/L 19   ALT U/L 34   ALK PHOS U/L 99   TOTAL PROTEIN g/dL 7 2   ALBUMIN g/dL 4 2   TOTAL BILIRUBIN mg/dL 0 19*   AMMONIA umol/L 11     Results from last 7 days   Lab Units 08/17/22  0328   GLUCOSE RANDOM mg/dL 122     Results from last 7 days   Lab Units 08/17/22  0328   CK TOTAL U/L 155   CK MB INDEX % <1 0   CK MB ng/mL 1 0 Results from last 7 days   Lab Units 08/17/22 2001 08/17/22  1813 08/17/22  1544 08/17/22  0736 08/17/22  0542 08/17/22  0328   HS TNI 0HR ng/L  --   --  524*  --   --  78*   HS TNI 2HR ng/L  --  482*  --   --  498*  --    HSTNI D2 ng/L  --  -42  --   --  420*  --    HS TNI 4HR ng/L 479*  --   --  707*  --   --    HSTNI D4 ng/L -45  --   --  629*  --   --      Results from last 7 days   Lab Units 08/17/22  0328   PROTIME seconds 13 6   INR  1 06   PTT seconds 23     Results from last 7 days   Lab Units 08/17/22  0542 08/17/22  0328   LACTIC ACID mmol/L 0 8 3 6*       Results from last 7 days   Lab Units 08/17/22  1300   AMPH/METH  Negative   BARBITURATE UR  Negative   BENZODIAZEPINE UR  Positive*   COCAINE UR  Negative   METHADONE URINE  Negative   OPIATE UR  Negative   PCP UR  Negative   THC UR  Negative     Results from last 7 days   Lab Units 08/17/22  0328   ETHANOL LVL mg/dL <3   ACETAMINOPHEN LVL ug/mL <0 1*   SALICYLATE LVL mg/dL 3 5         Medications:   Scheduled Medications:  carBAMazepine, 400 mg, Oral, HS  dicyclomine, 20 mg, Oral, BID  enoxaparin, 40 mg, Subcutaneous, Daily  labetalol, 10 mg, Intravenous, Q6H  nicotine, 1 patch, Transdermal, Daily  OLANZapine, 10 mg, Oral, Daily      Continuous IV Infusions:     PRN Meds:  OLANZapine, 10 mg, Intramuscular, Q8H PRN        Discharge Plan: D     Network Utilization Review Department  ATTENTION: Please call with any questions or concerns to [phone] and carefully listen to the prompts so that you are directed to the right person  All voicemails are confidential   Shivani Cain all requests for admission clinical reviews, approved or denied determinations and any other requests to dedicated fax number below belonging to the campus where the patient is receiving treatment   List of dedicated fax numbers for the Facilities:  [address] [place] [address] DENIALS (Administrative/Medical Necessity) [phone]   [address] [place] [address] (Maternity/NICU/Pediatrics) [address] [facility],7Th Floor [facility] 40 125 [facility]  [phone]   Olivia Duenas [address] [place] [id number] [place] [address] Beronica Ruth Ann Espinoza [facility] [address] [facility] [phone]

## 2022-08-19 NOTE — PLAN OF CARE
Problem: MOBILITY - ADULT  Goal: Maintain or return to baseline ADL function  Description: INTERVENTIONS:  -  Assess patient's ability to carry out ADLs; assess patient's baseline for ADL function and identify physical deficits which impact ability to perform ADLs (bathing, care of mouth/teeth, toileting, grooming, dressing, etc )  - Assess/evaluate cause of self-care deficits   - Assess range of motion  - Assess patient's mobility; develop plan if impaired  - Assess patient's need for assistive devices and provide as appropriate  - Encourage maximum independence but intervene and supervise when necessary  - Involve family in performance of ADLs  - Assess for home care needs following discharge   - Consider OT consult to assist with ADL evaluation and planning for discharge  - Provide patient education as appropriate  Outcome: Progressing  Goal: Maintains/Returns to pre admission functional level  Description: INTERVENTIONS:  - Perform BMAT or MOVE assessment daily    - Set and communicate daily mobility goal to care team and patient/family/caregiver  - Collaborate with rehabilitation services on mobility goals if consulted  - Perform Range of Motion 3 times a day  - Reposition patient every 2 hours    - Dangle patient 3 times a day  - Stand patient 3 times a day  - Ambulate patient 3 times a day  - Out of bed to chair 3 times a day   - Out of bed for meals 3 times a day  - Out of bed for toileting  - Record patient progress and toleration of activity level   Outcome: Progressing     Problem: Potential for Falls  Goal: Patient will remain free of falls  Description: INTERVENTIONS:  - Educate patient/family on patient safety including physical limitations  - Instruct patient to call for assistance with activity   - Consult OT/PT to assist with strengthening/mobility   - Keep Call bell within reach  - Keep bed low and locked with side rails adjusted as appropriate  - Keep care items and personal belongings within reach  - Initiate and maintain comfort rounds  - Make Fall Risk Sign visible to staff  - Offer Toileting every 2 Hours, in advance of need  - Initiate/Maintain alarm  - Obtain necessary fall risk management equipment  - Apply yellow socks and bracelet for high fall risk patients  - Consider moving patient to room near nurses station  Outcome: Progressing     Problem: Prexisting or High Potential for Compromised Skin Integrity  Goal: Skin integrity is maintained or improved  Description: INTERVENTIONS:  - Identify patients at risk for skin breakdown  - Assess and monitor skin integrity  - Assess and monitor nutrition and hydration status  - Monitor labs   - Assess for incontinence   - Turn and reposition patient  - Assist with mobility/ambulation  - Relieve pressure over bony prominences  - Avoid friction and shearing  - Provide appropriate hygiene as needed including keeping skin clean and dry  - Evaluate need for skin moisturizer/barrier cream  - Collaborate with interdisciplinary team   - Patient/family teaching  - Consider wound care consult   Outcome: Progressing     Problem: COPING  Goal: Pt/Family able to verbalize concerns and demonstrate effective coping strategies  Description: INTERVENTIONS:  - Assist patient/family to identify coping skills, available support systems and cultural and spiritual values  - Provide emotional support, including active listening and acknowledgement of concerns of patient and caregivers  - Reduce environmental stimuli, as able  - Provide patient education  - Assess for spiritual pain/suffering and initiate spiritual care, including notification of Pastoral Care or jazmin based community as needed  - Assess effectiveness of coping strategies  Outcome: Progressing  Goal: Will report anxiety at manageable levels  Description: INTERVENTIONS:  - Administer medication as ordered  - Teach and encourage coping skills  - Provide emotional support  - Assess patient/family for anxiety and ability to cope  Outcome: Progressing     Problem: DECISION MAKING  Goal: Pt/Family able to effectively weigh alternatives and participate in decision making related to treatment and care  Description: INTERVENTIONS:  - Identify decision maker  - Determine when there are differences among patient's view, family's view, and healthcare provider's view of patient condition and care goals  - Facilitate patient/family articulation of goals for care  - Help patient/family identify pros/cons of alternative solutions  - Provide information as requested by patient/family  - Respect patient/family rights related to privacy and sharing information   - Serve as a liaison between patient, family and health care team  - Initiate consults as appropriate (Ethics Team, Palliative Care, Family Care Conference, etc )  Outcome: Progressing     Problem: CONFUSION/THOUGHT DISTURBANCE  Goal: Thought disturbances (confusion, delirium, depression, dementia or psychosis) are managed to maintain or return to baseline mental status and functional level  Description: INTERVENTIONS:  - Assess for possible contributors to  thought disturbance, including but not limited to medications, infection, impaired vision or hearing, underlying metabolic abnormalities, dehydration, respiratory compromise,  psychiatric diagnoses and notify attending PHYSICAN/AP  - Monitor and intervene to maintain adequate nutrition, hydration, elimination, sleep and activity  - Decrease environmental stimuli, including noise as appropriate  - Provide frequent contacts to provide refocusing, direction and reassurance as needed  Approach patient calmly with eye contact and at their level    - [place] high risk fall precautions, aspiration precautions and other safety measures, as indicated  - If delirium suspected, notify physician/AP of change in condition and request immediate in-person evaluation  - Pursue consults as appropriate including Geriatric (campus dependent), OT for cognitive evaluation/activity planning, psychiatric, pastoral care, etc   Outcome: Progressing     Problem: BEHAVIOR  Goal: Pt/Family maintain appropriate behavior and adhere to behavioral management agreement, if implemented  Description: INTERVENTIONS:  - Assess the family dynamic   - Encourage verbalization of thoughts and concerns in a socially appropriate manner  - Assess patient/family's coping skills and non-compliant behavior (including use of illegal substances)  - Utilize positive, consistent limit setting strategies supporting safety of patient, staff and others  - Initiate consult with Case Management, Spiritual Care or other ancillary services as appropriate  - If a patient's/visitor's behavior jeopardizes the safety of the patient, staff, or others, refer to organization procedure  - Notify Security of behavior or suspected illegal substances which indicate the need for search of the patient and/or belongings  - Encourage participation in the decision making process about a behavioral management agreement; implement if patient meets criteria  Outcome: Progressing     Problem: Depression - IP adult  Goal: Effects of depression will be minimized  Description: INTERVENTIONS:  - Assess impact of patient's symptoms on level of functioning, self-care needs and offer support as indicated  - Assess patient/family knowledge of depression, impact on illness and need for teaching  - Provide emotional support, presence and reassurance  - Assess for possible suicidal thoughts, ideation or self-harm   If patient expresses suicidal thoughts or statements do not leave alone, notify physician/AP immediately, initiate Suicide Precautions, and determine need for continual observation   - Initiate consults and referrals as appropriate (a mental health professional, Spiritual Care)  - Administer medication as ordered  Outcome: Progressing     Problem: SELF HARM  Goal: Effect of psychiatric condition will be minimized and patient will be protected from self harm  Description: INTERVENTIONS:  - Assess impact of patient's symptoms on level of functioning, self-care needs and offer support as indicated  - Assess patient/family knowledge of depression, impact on illness and need for teaching  - Provide emotional support, presence and reassurance  - Assess for possible suicidal thoughts, ideation or self-harm  If patient expresses suicidal thoughts or statements do not leave alone, notify physician/AP immediately, initiate suicide precautions, and determine need for continual observation    - initiate consults and referrals as appropriate (a mental health professional, Spiritual Care  Outcome: Progressing

## 2022-08-20 PROBLEM — I5A NON-ISCHEMIC MYOCARDIAL INJURY (NON-TRAUMATIC): Status: ACTIVE | Noted: 2022-08-17

## 2022-08-20 PROCEDURE — 99231 SBSQ HOSP IP/OBS SF/LOW 25: CPT | Performed by: NURSE PRACTITIONER

## 2022-08-20 RX ORDER — WATER 1000 ML/1000ML
INJECTION, SOLUTION INTRAVENOUS
Status: COMPLETED
Start: 2022-08-20 | End: 2022-08-20

## 2022-08-20 RX ADMIN — OLANZAPINE 10 MG: 10 INJECTION, POWDER, FOR SOLUTION INTRAMUSCULAR at 23:01

## 2022-08-20 RX ADMIN — LABETALOL HYDROCHLORIDE 10 MG: 5 INJECTION, SOLUTION INTRAVENOUS at 22:42

## 2022-08-20 RX ADMIN — LABETALOL HYDROCHLORIDE 10 MG: 5 INJECTION, SOLUTION INTRAVENOUS at 17:04

## 2022-08-20 RX ADMIN — NICOTINE 1 PATCH: 21 PATCH, EXTENDED RELEASE TRANSDERMAL at 09:36

## 2022-08-20 RX ADMIN — LABETALOL HYDROCHLORIDE 10 MG: 5 INJECTION, SOLUTION INTRAVENOUS at 09:33

## 2022-08-20 RX ADMIN — WATER 10 ML: 1 INJECTION INTRAMUSCULAR; INTRAVENOUS; SUBCUTANEOUS at 23:01

## 2022-08-20 RX ADMIN — CARBAMAZEPINE 400 MG: 200 TABLET ORAL at 22:42

## 2022-08-20 RX ADMIN — LABETALOL HYDROCHLORIDE 10 MG: 5 INJECTION, SOLUTION INTRAVENOUS at 05:11

## 2022-08-20 NOTE — PLAN OF CARE
Problem: CONFUSION/THOUGHT DISTURBANCE  Goal: Thought disturbances (confusion, delirium, depression, dementia or psychosis) are managed to maintain or return to baseline mental status and functional level  Description: INTERVENTIONS:  - Assess for possible contributors to  thought disturbance, including but not limited to medications, infection, impaired vision or hearing, underlying metabolic abnormalities, dehydration, respiratory compromise,  psychiatric diagnoses and notify attending PHYSICAN/AP  - Monitor and intervene to maintain adequate nutrition, hydration, elimination, sleep and activity  - Decrease environmental stimuli, including noise as appropriate  - Provide frequent contacts to provide refocusing, direction and reassurance as needed  Approach patient calmly with eye contact and at their level  - [place] high risk fall precautions, aspiration precautions and other safety measures, as indicated  - If delirium suspected, notify physician/AP of change in condition and request immediate in-person evaluation  - Pursue consults as appropriate including Geriatric (campus dependent), OT for cognitive evaluation/activity planning, psychiatric, pastoral care, etc   Outcome: Progressing     Problem: BEHAVIOR  Goal: Pt/Family maintain appropriate behavior and adhere to behavioral management agreement, if implemented  Description: INTERVENTIONS:  - Assess the family dynamic   - Encourage verbalization of thoughts and concerns in a socially appropriate manner  - Assess patient/family's coping skills and non-compliant behavior (including use of illegal substances)  - Utilize positive, consistent limit setting strategies supporting safety of patient, staff and others  - Initiate consult with Case Management, Spiritual Care or other ancillary services as appropriate  - If a patient's/visitor's behavior jeopardizes the safety of the patient, staff, or others, refer to organization procedure     - Notify Security of behavior or suspected illegal substances which indicate the need for search of the patient and/or belongings  - Encourage participation in the decision making process about a behavioral management agreement; implement if patient meets criteria  Outcome: Progressing     Problem: Depression - IP adult  Goal: Effects of depression will be minimized  Description: INTERVENTIONS:  - Assess impact of patient's symptoms on level of functioning, self-care needs and offer support as indicated  - Assess patient/family knowledge of depression, impact on illness and need for teaching  - Provide emotional support, presence and reassurance  - Assess for possible suicidal thoughts, ideation or self-harm  If patient expresses suicidal thoughts or statements do not leave alone, notify physician/AP immediately, initiate Suicide Precautions, and determine need for continual observation   - Initiate consults and referrals as appropriate (a mental health professional, Spiritual Care)  - Administer medication as ordered  Outcome: Progressing     Problem: SELF HARM  Goal: Effect of psychiatric condition will be minimized and patient will be protected from self harm  Description: INTERVENTIONS:  - Assess impact of patient's symptoms on level of functioning, self-care needs and offer support as indicated  - Assess patient/family knowledge of depression, impact on illness and need for teaching  - Provide emotional support, presence and reassurance  - Assess for possible suicidal thoughts, ideation or self-harm  If patient expresses suicidal thoughts or statements do not leave alone, notify physician/AP immediately, initiate suicide precautions, and determine need for continual observation    - initiate consults and referrals as appropriate (a mental health professional, Spiritual Care  Outcome: Progressing     Problem: Nutrition/Hydration-ADULT  Goal: Nutrient/Hydration intake appropriate for improving, restoring or maintaining nutritional needs  Description: Monitor and assess patient's nutrition/hydration status for malnutrition  Collaborate with interdisciplinary team and initiate plan and interventions as ordered  Monitor patient's weight and dietary intake as ordered or per policy  Utilize nutrition screening tool and intervene as necessary  Determine patient's food preferences and provide high-protein, high-caloric foods as appropriate       INTERVENTIONS:  - Monitor oral intake, urinary output, labs, and treatment plans  - Assess nutrition and hydration status and recommend course of action  - Evaluate amount of meals eaten  - Assist patient with eating if necessary   - Allow adequate time for meals  - Recommend/ encourage appropriate diets, oral nutritional supplements, and vitamin/mineral supplements  - Order, calculate, and assess calorie counts as needed  - Recommend, monitor, and adjust tube feedings and TPN/PPN based on assessed needs  - Assess need for intravenous fluids  - Provide specific nutrition/hydration education as appropriate  - Include patient/family/caregiver in decisions related to nutrition  Outcome: Progressing

## 2022-08-20 NOTE — ASSESSMENT & PLAN NOTE
· Patient presented to the ED with complaints of paranoia  · Hx of bipolar  · Psych consult; appreciate input  · Recommending 302 in setting of patient lacking insight and judgement at this time  · Recommending 1:1 as indicated  · Zyprexa 10 mg daily as a mood stabilizer and for psychosis  · Can give 10 mg Zyprexa IM every 6 hours as needed, but not to exceed 30 mg in 24 hrs  · Psych re-consult recommending to pursue 302  · Patient is now medically cleared for inpatient psychiatric treatment

## 2022-08-20 NOTE — PLAN OF CARE
Problem: MOBILITY - ADULT  Goal: Maintain or return to baseline ADL function  Description: INTERVENTIONS:  -  Assess patient's ability to carry out ADLs; assess patient's baseline for ADL function and identify physical deficits which impact ability to perform ADLs (bathing, care of mouth/teeth, toileting, grooming, dressing, etc )  - Assess/evaluate cause of self-care deficits   - Assess range of motion  - Assess patient's mobility; develop plan if impaired  - Assess patient's need for assistive devices and provide as appropriate  - Encourage maximum independence but intervene and supervise when necessary  - Involve family in performance of ADLs  - Assess for home care needs following discharge   - Consider OT consult to assist with ADL evaluation and planning for discharge  - Provide patient education as appropriate  Outcome: Progressing  Goal: Maintains/Returns to pre admission functional level  Description: INTERVENTIONS:  - Perform BMAT or MOVE assessment daily    - Set and communicate daily mobility goal to care team and patient/family/caregiver  - Collaborate with rehabilitation services on mobility goals if consulted  - Perform Range of Motion 3 times a day  - Reposition patient every 3 hours    - Dangle patient 3 times a day  - Stand patient 3 times a day  - Ambulate patient 3 times a day  - Out of bed to chair 3 times a day   - Out of bed for meals 3 times a day  - Out of bed for toileting  - Record patient progress and toleration of activity level   Outcome: Progressing     Problem: Potential for Falls  Goal: Patient will remain free of falls  Description: INTERVENTIONS:  - Educate patient/family on patient safety including physical limitations  - Instruct patient to call for assistance with activity   - Consult OT/PT to assist with strengthening/mobility   - Keep Call bell within reach  - Keep bed low and locked with side rails adjusted as appropriate  - Keep care items and personal belongings within reach  - Initiate and maintain comfort rounds  - Make Fall Risk Sign visible to staff  - Offer Toileting every 3 Hours, in advance of need  - Initiate/Maintain bed alarm  - Obtain necessary fall risk management equipment  - Apply yellow socks and bracelet for high fall risk patients  - Consider moving patient to room near nurses station  Outcome: Progressing     Problem: Prexisting or High Potential for Compromised Skin Integrity  Goal: Skin integrity is maintained or improved  Description: INTERVENTIONS:  - Identify patients at risk for skin breakdown  - Assess and monitor skin integrity  - Assess and monitor nutrition and hydration status  - Monitor labs   - Assess for incontinence   - Turn and reposition patient  - Assist with mobility/ambulation  - Relieve pressure over bony prominences  - Avoid friction and shearing  - Provide appropriate hygiene as needed including keeping skin clean and dry  - Evaluate need for skin moisturizer/barrier cream  - Collaborate with interdisciplinary team   - Patient/family teaching  - Consider wound care consult   Outcome: Progressing     Problem: COPING  Goal: Pt/Family able to verbalize concerns and demonstrate effective coping strategies  Description: INTERVENTIONS:  - Assist patient/family to identify coping skills, available support systems and cultural and spiritual values  - Provide emotional support, including active listening and acknowledgement of concerns of patient and caregivers  - Reduce environmental stimuli, as able  - Provide patient education  - Assess for spiritual pain/suffering and initiate spiritual care, including notification of Pastoral Care or jazmin based community as needed  - Assess effectiveness of coping strategies  Outcome: Progressing  Goal: Will report anxiety at manageable levels  Description: INTERVENTIONS:  - Administer medication as ordered  - Teach and encourage coping skills  - Provide emotional support  - Assess patient/family for anxiety and ability to cope  Outcome: Progressing     Problem: DECISION MAKING  Goal: Pt/Family able to effectively weigh alternatives and participate in decision making related to treatment and care  Description: INTERVENTIONS:  - Identify decision maker  - Determine when there are differences among patient's view, family's view, and healthcare provider's view of patient condition and care goals  - Facilitate patient/family articulation of goals for care  - Help patient/family identify pros/cons of alternative solutions  - Provide information as requested by patient/family  - Respect patient/family rights related to privacy and sharing information   - Serve as a liaison between patient, family and health care team  - Initiate consults as appropriate (Ethics Team, Palliative Care, Family Care Conference, etc )  Outcome: Progressing     Problem: CONFUSION/THOUGHT DISTURBANCE  Goal: Thought disturbances (confusion, delirium, depression, dementia or psychosis) are managed to maintain or return to baseline mental status and functional level  Description: INTERVENTIONS:  - Assess for possible contributors to  thought disturbance, including but not limited to medications, infection, impaired vision or hearing, underlying metabolic abnormalities, dehydration, respiratory compromise,  psychiatric diagnoses and notify attending PHYSICAN/AP  - Monitor and intervene to maintain adequate nutrition, hydration, elimination, sleep and activity  - Decrease environmental stimuli, including noise as appropriate  - Provide frequent contacts to provide refocusing, direction and reassurance as needed  Approach patient calmly with eye contact and at their level    - [place] high risk fall precautions, aspiration precautions and other safety measures, as indicated  - If delirium suspected, notify physician/AP of change in condition and request immediate in-person evaluation  - Pursue consults as appropriate including Geriatric (campus dependent), OT for cognitive evaluation/activity planning, psychiatric, pastoral care, etc   Outcome: Progressing     Problem: BEHAVIOR  Goal: Pt/Family maintain appropriate behavior and adhere to behavioral management agreement, if implemented  Description: INTERVENTIONS:  - Assess the family dynamic   - Encourage verbalization of thoughts and concerns in a socially appropriate manner  - Assess patient/family's coping skills and non-compliant behavior (including use of illegal substances)  - Utilize positive, consistent limit setting strategies supporting safety of patient, staff and others  - Initiate consult with Case Management, Spiritual Care or other ancillary services as appropriate  - If a patient's/visitor's behavior jeopardizes the safety of the patient, staff, or others, refer to organization procedure  - Notify Security of behavior or suspected illegal substances which indicate the need for search of the patient and/or belongings  - Encourage participation in the decision making process about a behavioral management agreement; implement if patient meets criteria  Outcome: Progressing     Problem: Depression - IP adult  Goal: Effects of depression will be minimized  Description: INTERVENTIONS:  - Assess impact of patient's symptoms on level of functioning, self-care needs and offer support as indicated  - Assess patient/family knowledge of depression, impact on illness and need for teaching  - Provide emotional support, presence and reassurance  - Assess for possible suicidal thoughts, ideation or self-harm   If patient expresses suicidal thoughts or statements do not leave alone, notify physician/AP immediately, initiate Suicide Precautions, and determine need for continual observation   - Initiate consults and referrals as appropriate (a mental health professional, Spiritual Care)  - Administer medication as ordered  Outcome: Progressing     Problem: SELF HARM  Goal: Effect of psychiatric condition will be minimized and patient will be protected from self harm  Description: INTERVENTIONS:  - Assess impact of patient's symptoms on level of functioning, self-care needs and offer support as indicated  - Assess patient/family knowledge of depression, impact on illness and need for teaching  - Provide emotional support, presence and reassurance  - Assess for possible suicidal thoughts, ideation or self-harm  If patient expresses suicidal thoughts or statements do not leave alone, notify physician/AP immediately, initiate suicide precautions, and determine need for continual observation  - initiate consults and referrals as appropriate (a mental health professional, Spiritual Care  Outcome: Progressing     Problem: Nutrition/Hydration-ADULT  Goal: Nutrient/Hydration intake appropriate for improving, restoring or maintaining nutritional needs  Description: Monitor and assess patient's nutrition/hydration status for malnutrition  Collaborate with interdisciplinary team and initiate plan and interventions as ordered  Monitor patient's weight and dietary intake as ordered or per policy  Utilize nutrition screening tool and intervene as necessary  Determine patient's food preferences and provide high-protein, high-caloric foods as appropriate       INTERVENTIONS:  - Monitor oral intake, urinary output, labs, and treatment plans  - Assess nutrition and hydration status and recommend course of action  - Evaluate amount of meals eaten  - Assist patient with eating if necessary   - Allow adequate time for meals  - Recommend/ encourage appropriate diets, oral nutritional supplements, and vitamin/mineral supplements  - Order, calculate, and assess calorie counts as needed  - Recommend, monitor, and adjust tube feedings and TPN/PPN based on assessed needs  - Assess need for intravenous fluids  - Provide specific nutrition/hydration education as appropriate  - Include patient/family/caregiver in decisions related to nutrition  Outcome: Progressing

## 2022-08-20 NOTE — UTILIZATION REVIEW
Continued Stay Review    Date:     8/20/22                          Current Patient Class:    Inpatient  Current Level of Care:    Med surg    HPI:42 y o  female initially admitted on OBS order 8/17 @ 0608 converted to IP on 8/19 @  for continued psych consult      Assessment/Plan:   8/20     Remains on  1:1  Observation  Waiting   IP pschy placement,  Needs  302  Contineu current meds/treatment plan        Vital Signs:   -- 99 -- 117/83 94 -- -- --   08/20/22 05:04:28 -- 126 Abnormal  -- 135/98 110 94 % --         Pertinent Labs/Diagnostic Results:   Results from last 7 days   Lab Units 08/19/22  1519   SARS-COV-2  Negative     Results from last 7 days   Lab Units 08/17/22  0328   WBC Thousand/uL 13 15*   HEMOGLOBIN g/dL 14 8   HEMATOCRIT % 44 5   PLATELETS Thousands/uL 295   NEUTROS ABS Thousands/µL 10 32*         Results from last 7 days   Lab Units 08/17/22  0328   SODIUM mmol/L 141   POTASSIUM mmol/L 3 9   CHLORIDE mmol/L 107   CO2 mmol/L 22   ANION GAP mmol/L 12   BUN mg/dL 18   CREATININE mg/dL 1 00   EGFR ml/min/1 73sq m 70   CALCIUM mg/dL 9 1     Results from last 7 days   Lab Units 08/17/22  0328   AST U/L 19   ALT U/L 34   ALK PHOS U/L 99   TOTAL PROTEIN g/dL 7 2   ALBUMIN g/dL 4 2   TOTAL BILIRUBIN mg/dL 0 19*   AMMONIA umol/L 11         Results from last 7 days   Lab Units 08/17/22  0328   GLUCOSE RANDOM mg/dL 122           Results from last 7 days   Lab Units 08/17/22  0328   CK TOTAL U/L 155   CK MB INDEX % <1 0   CK MB ng/mL 1 0     Results from last 7 days   Lab Units 08/17/22 2001 08/17/22  1813 08/17/22  1544 08/17/22  0736 08/17/22  0542 08/17/22  0328   HS TNI 0HR ng/L  --   --  524*  --   --  78*   HS TNI 2HR ng/L  --  482*  --   --  498*  --    HSTNI D2 ng/L  --  -42  --   --  420*  --    HS TNI 4HR ng/L 479*  --   --  707*  --   --    HSTNI D4 ng/L -45  --   --  629*  --   --          Results from last 7 days   Lab Units 08/17/22  0328   PROTIME seconds 13 6   INR  1 06   PTT seconds 23 Results from last 7 days   Lab Units 08/17/22  0542 08/17/22  0328   LACTIC ACID mmol/L 0 8 3 6*           Results from last 7 days   Lab Units 08/19/22  1519   INFLUENZA A PCR  Negative   INFLUENZA B PCR  Negative   RSV PCR  Negative         Results from last 7 days   Lab Units 08/17/22  1300   AMPH/METH  Negative   BARBITURATE UR  Negative   BENZODIAZEPINE UR  Positive*   COCAINE UR  Negative   METHADONE URINE  Negative   OPIATE UR  Negative   PCP UR  Negative   THC UR  Negative     Results from last 7 days   Lab Units 08/19/22  1519 08/17/22  0328   ETHANOL LVL mg/dL <3 <3   ACETAMINOPHEN LVL ug/mL  --  <7 0*   SALICYLATE LVL mg/dL  --  3 5         Medications:   Scheduled Medications:  carBAMazepine, 400 mg, Oral, HS  dicyclomine, 20 mg, Oral, BID  enoxaparin, 40 mg, Subcutaneous, Daily  labetalol, 10 mg, Intravenous, Q6H  nicotine, 1 patch, Transdermal, Daily  OLANZapine, 10 mg, Oral, Daily      Continuous IV Infusions:     PRN Meds:  OLANZapine, 10 mg, Intramuscular, Q8H PRN        Discharge Plan:    D    Network Utilization Review Department  ATTENTION: Please call with any questions or concerns to [phone] and carefully listen to the prompts so that you are directed to the right person  All voicemails are confidential   [facility] all requests for admission clinical reviews, approved or denied determinations and any other requests to dedicated fax number below belonging to the campus where the patient is receiving treatment   List of dedicated fax numbers for the Facilities:  [address] DENIALS (Administrative/Medical Necessity) [phone]   1000  [facility] (Maternity/NICU/Pediatrics) [phone]   [address] [place] [address]  [address] Medical [place] [phone]     Truong Bonilla [address] [address] Beronica Espinoza 1481 [address] [facility] [phone]

## 2022-08-20 NOTE — PROGRESS NOTES
[facility]  Progress Note - Aleja Files 1980, 40 y o  female MRN: [id number]  Unit/Bed#: -01 Encounter: [id number]  Primary Care Provider: No primary care provider on file  Date and time admitted to hospital: 8/17/2022  3:09 AM    * Bipolar affective disorder, currently manic, mild ([address] )  Assessment & Plan  · Patient presented to the ED with complaints of paranoia  · Hx of bipolar  · Psych consult; appreciate input  · Recommending 302 in setting of patient lacking insight and judgement at this time  · Recommending 1:1 as indicated  · Zyprexa 10 mg daily as a mood stabilizer and for psychosis  · Can give 10 mg Zyprexa IM every 6 hours as needed, but not to exceed 30 mg in 24 hrs  · Psych re-consult recommending to pursue 302  · Patient is now medically cleared for inpatient psychiatric treatment  Non-ischemic myocardial injury (non-traumatic)  Assessment & Plan  · Present on admission  · Initial trop 78, 2 hr delta 420, 4 hr delta 629  · Cardiology consult, appreciate input  · Echo (8/17/22): Left ventricular cavity size is normal  Wall thickness is normal  The left ventricular ejection fraction is 65% by visual estimation  Systolic function is normal   Wall motion is normal   · Elevated troponin levels likely secondary to hypertension/tachycardia  Hypertension  Assessment & Plan  · History of hypertension; uncontrolled on admission  · Unfortunately patient is not taking her oral medications at this time  · Amlodipine and Coreg discontinued until patient is agreeable to taking orals  · Will continue 10 mg of IV labetalol every 6 hours around the clock  · Will start patient on oral medications as soon as she is cooperative  VTE Pharmacologic Prophylaxis: VTE Score: 3 Moderate Risk (Score 3-4) - Pharmacological DVT Prophylaxis Ordered: enoxaparin (Lovenox)  Patient Centered Rounds: I performed bedside rounds with nursing staff today    Discussions with Specialists or Other Care Team Provider:  Case management    Education and Discussions with Family / Patient: Patient declined call to   Time Spent for Care: 20 minutes  More than 50% of total time spent on counseling and coordination of care as described above  Current Length of Stay: 1 day(s)  Current Patient Status: Inpatient   Certification Statement: The patient will continue to require additional inpatient hospital stay due to Medically stable for psychiatric treatment  Discharge Plan: Patient is medically stable for psychiatric treatment, crisis has been consulted  Code Status: Level 1 - Full Code    Subjective:   Patient seen standing in her room with one-to-one at bedside  No acute distress noted  Objective:     Vitals:   No data recorded  HR:  [] 99  BP: (117-155)/() 117/83  SpO2:  [94 %] 94 %  Body mass index is 43 9 kg/m²  Input and Output Summary (last 24 hours): Intake/Output Summary (Last 24 hours) at 8/20/2022 1102  Last data filed at 8/20/2022 0746  Gross per 24 hour   Intake 620 ml   Output --   Net 620 ml       Physical Exam:   Physical Exam  Vitals and nursing note reviewed  Constitutional:       General: She is not in acute distress  Appearance: She is normal weight  She is ill-appearing  Cardiovascular:      Rate and Rhythm: Normal rate  Pulmonary:      Effort: Pulmonary effort is normal    Musculoskeletal:         General: Normal range of motion  Neurological:      Mental Status: She is alert  Mental status is at baseline  Psychiatric:         Mood and Affect: Mood is anxious  Speech: Speech is delayed  Behavior: Behavior is withdrawn  Judgment: Judgment is impulsive and inappropriate            Additional Data:     Labs:  Results from last 7 days   Lab Units 08/17/22  0328   WBC Thousand/uL 13 15*   HEMOGLOBIN g/dL 14 8   HEMATOCRIT % 44 5   PLATELETS Thousands/uL 295   NEUTROS PCT % 79*   LYMPHS PCT % 13*   MONOS PCT % 8   EOS PCT % 0     Results from last 7 days   Lab Units 08/17/22  0328   SODIUM mmol/L 141   POTASSIUM mmol/L 3 9   CHLORIDE mmol/L 107   CO2 mmol/L 22   BUN mg/dL 18   CREATININE mg/dL 1 00   ANION GAP mmol/L 12   CALCIUM mg/dL 9 1   ALBUMIN g/dL 4 2   TOTAL BILIRUBIN mg/dL 0 19*   ALK PHOS U/L 99   ALT U/L 34   AST U/L 19   GLUCOSE RANDOM mg/dL 122     Results from last 7 days   Lab Units 08/17/22  0328   INR  1 06             Results from last 7 days   Lab Units 08/17/22  0542 08/17/22  0328   LACTIC ACID mmol/L 0 8 3 6*       Lines/Drains:  Invasive Devices  Report    None                   Imaging: No pertinent imaging reviewed  Recent Cultures (last 7 days):         Last 24 Hours Medication List:   Current Facility-Administered Medications   Medication Dose Route Frequency Provider Last Rate    carBAMazepine  400 mg Oral HS Raul Stapleton MD      dicyclomine  20 mg Oral BID Raul Stapleton MD      enoxaparin  40 mg Subcutaneous Daily Raul Stapleton MD      labetalol  10 mg Intravenous Q6H JONATHON Jimenez      nicotine  1 patch Transdermal Daily Raul Stapleton MD      OLANZapine  10 mg Intramuscular Q8H PRN Raul Stapleton MD      OLANZapine  10 mg Oral Daily Raul Stapleton MD          Today, Patient Was Seen By: JONATHON Jimenez    **Please Note: This note may have been constructed using a voice recognition system  **

## 2022-08-20 NOTE — CASE MANAGEMENT
Case Management Discharge Planning Note    Patient name Martin Pinon  Location [address] [id number] MRN [id number]  : 1980 Date 2022       Current Admission Date: 2022  Current Admission Diagnosis:Bipolar affective disorder, currently manic, mild ([facility] )   Patient Active Problem List    Diagnosis Date Noted    Non-ischemic myocardial injury (non-traumatic) 2022    Hypertension 2021    Bipolar affective disorder, currently manic, mild ([facility] ) 2021    Nasal congestion 2021    Back pain 2021    Cracking skin 2021    Cannabis abuse 2017    Bipolar I disorder, most recent episode mixed, severe with psychotic features ([facility] ) 2017    Suicidal thoughts 2017      LOS (days): 1  Geometric Mean LOS (GMLOS) (days): 2 60  Days to GMLOS:1 7     OBJECTIVE:  Risk of Unplanned Readmission Score: 11 15         Current admission status: Inpatient   Preferred Pharmacy:   [address] [place] [address] [id number] R R 1 ([phone] [id number] [phone] [id number])  [address] [place], [place] [id number]  Phone: [phone] Fax: [phone]    Primary Care Provider: No primary care provider on file  Primary Insurance: [employer] MEDICARE REP  Secondary Insurance: [address]    DISCHARGE DETAILS:                                          Other Referral/Resources/Interventions Provided:  Interventions: Psych Rehab  Referral Comments: Pt is medically6 cleared for discharge  CM asked SLIM to place a crisis consult    Handoff given to Crisis via E-mail

## 2022-08-21 PROCEDURE — 99231 SBSQ HOSP IP/OBS SF/LOW 25: CPT | Performed by: NURSE PRACTITIONER

## 2022-08-21 RX ADMIN — OLANZAPINE 10 MG: 10 TABLET, FILM COATED ORAL at 08:07

## 2022-08-21 RX ADMIN — NICOTINE 1 PATCH: 21 PATCH, EXTENDED RELEASE TRANSDERMAL at 08:07

## 2022-08-21 RX ADMIN — LABETALOL HYDROCHLORIDE 10 MG: 5 INJECTION, SOLUTION INTRAVENOUS at 22:12

## 2022-08-21 RX ADMIN — ENOXAPARIN SODIUM 40 MG: 40 INJECTION SUBCUTANEOUS at 08:07

## 2022-08-21 RX ADMIN — DICYCLOMINE HYDROCHLORIDE 20 MG: 20 TABLET ORAL at 08:07

## 2022-08-21 RX ADMIN — LABETALOL HYDROCHLORIDE 10 MG: 5 INJECTION, SOLUTION INTRAVENOUS at 17:13

## 2022-08-21 RX ADMIN — LABETALOL HYDROCHLORIDE 10 MG: 5 INJECTION, SOLUTION INTRAVENOUS at 09:28

## 2022-08-21 NOTE — ED NOTES
CW attempted to speak w/pt who appears to be watching television at this time  Pt did engage in minimal conversation w/this writer  Pt reports, "I don't have any clothes w/me and I have a warrant out for my arrest so I don't think I can go anywhere today " CW asked pt is pt remembered speaking w/Dr Angel Palmer or Dr Jaylon Obrien, both psychiatrists, via iPad's  Pt replies, "Dain Hinojosa is my psychiatrist but this is just a therapy session " CW advised pt if pt had any questions or wanted to discuss IP tx later, pt could request to speak w/crisis  Pt closed her eyes and stated, "I don't have any clothes and I need to take a shower " CW advised pt pt appeared tired and maybe getting some rest at this time would be a good idea   Pt responded, "Yes I will rest now "     TDS, CW

## 2022-08-21 NOTE — UTILIZATION REVIEW
Inpatient Admission Authorization Request   NOTIFICATION OF INPATIENT ADMISSION/INPATIENT AUTHORIZATION REQUEST   SERVICING FACILITY:   [address] [place] [address]  Tax ID: [id number]  NPI: [id number]  Place of Service: Inpatient [facility]  [address]  Place of Service Code: 24     ATTENDING PROVIDER:  Attending Name and NPI#: Karlene Gardner [[id number]]  Address: [address] [place] [address]  Phone: [phone]     UTILIZATION REVIEW CONTACT:  Becca Lainez Utilization   Network Utilization Review Department  Phone: [phone]  Fax [phone]  Email: [email or web address]     PHYSICIAN ADVISORY SERVICES:  FOR [id number] REVIEW - MEDICAL NECESSITY DENIAL  Phone: [phone]  Fax: [phone]  Email: [email or web address]     TYPE OF REQUEST:  Inpatient Status     ADMISSION INFORMATION:  ADMISSION DATE/TIME: 8/19/22  1:05 PM  PATIENT DIAGNOSIS CODE/DESCRIPTION:  Delirium [R41 0]  Elevated troponin [R77 8]  Elevated lactic acid level [R79 89]  Evaluation by psychiatric service required [Z00 8]  DISCHARGE DATE/TIME: No discharge date for patient encounter  IMPORTANT INFORMATION:  Please contact Becca Lainez directly with any questions or concerns regarding this request  Department voicemails are confidential     Send requests for admission clinical reviews, concurrent reviews, approvals, and administrative denials due to lack of clinical to fax [phone]

## 2022-08-21 NOTE — ED NOTES
CW placed prepared 302 commitment form within pt's chart  CW notes, pt does not appear to have the insight or understanding at this time to sign in voluntarily for [facility] tx      Clarence Godoy will advise SLIM of same  TT sent at 1115      TDS, CW

## 2022-08-21 NOTE — PLAN OF CARE
Problem: MOBILITY - ADULT  Goal: Maintain or return to baseline ADL function  Description: INTERVENTIONS:  -  Assess patient's ability to carry out ADLs; assess patient's baseline for ADL function and identify physical deficits which impact ability to perform ADLs (bathing, care of mouth/teeth, toileting, grooming, dressing, etc )  - Assess/evaluate cause of self-care deficits   - Assess range of motion  - Assess patient's mobility; develop plan if impaired  - Assess patient's need for assistive devices and provide as appropriate  - Encourage maximum independence but intervene and supervise when necessary  - Involve family in performance of ADLs  - Assess for home care needs following discharge   - Consider OT consult to assist with ADL evaluation and planning for discharge  - Provide patient education as appropriate  Outcome: Progressing  Goal: Maintains/Returns to pre admission functional level  Description: INTERVENTIONS:  - Perform BMAT or MOVE assessment daily    - Set and communicate daily mobility goal to care team and patient/family/caregiver  - Collaborate with rehabilitation services on mobility goals if consulted  - Perform Range of Motion 3 times a day  - Reposition patient every 2 hours    - Dangle patient 3 times a day  - Stand patient 3 times a day  - Ambulate patient 3 times a day  - Out of bed to chair 3 times a day   - Out of bed for meals 3 times a day  - Out of bed for toileting  - Record patient progress and toleration of activity level   Outcome: Progressing     Problem: Potential for Falls  Goal: Patient will remain free of falls  Description: INTERVENTIONS:  - Educate patient/family on patient safety including physical limitations  - Instruct patient to call for assistance with activity   - Consult OT/PT to assist with strengthening/mobility   - Keep Call bell within reach  - Keep bed low and locked with side rails adjusted as appropriate  - Keep care items and personal belongings within reach  - Initiate and maintain comfort rounds  - Make Fall Risk Sign visible to staff  - Offer Toileting every 2 Hours, in advance of need  - Initiate/Maintain bedalarm  - Obtain necessary fall risk management equipment:   - Apply yellow socks and bracelet for high fall risk patients  - Consider moving patient to room near nurses station  Outcome: Progressing     Problem: Prexisting or High Potential for Compromised Skin Integrity  Goal: Skin integrity is maintained or improved  Description: INTERVENTIONS:  - Identify patients at risk for skin breakdown  - Assess and monitor skin integrity  - Assess and monitor nutrition and hydration status  - Monitor labs   - Assess for incontinence   - Turn and reposition patient  - Assist with mobility/ambulation  - Relieve pressure over bony prominences  - Avoid friction and shearing  - Provide appropriate hygiene as needed including keeping skin clean and dry  - Evaluate need for skin moisturizer/barrier cream  - Collaborate with interdisciplinary team   - Patient/family teaching  - Consider wound care consult   Outcome: Progressing     Problem: COPING  Goal: Pt/Family able to verbalize concerns and demonstrate effective coping strategies  Description: INTERVENTIONS:  - Assist patient/family to identify coping skills, available support systems and cultural and spiritual values  - Provide emotional support, including active listening and acknowledgement of concerns of patient and caregivers  - Reduce environmental stimuli, as able  - Provide patient education  - Assess for spiritual pain/suffering and initiate spiritual care, including notification of Pastoral Care or jazmin based community as needed  - Assess effectiveness of coping strategies  Outcome: Progressing  Goal: Will report anxiety at manageable levels  Description: INTERVENTIONS:  - Administer medication as ordered  - Teach and encourage coping skills  - Provide emotional support  - Assess patient/family for anxiety and ability to cope  Outcome: Progressing     Problem: DECISION MAKING  Goal: Pt/Family able to effectively weigh alternatives and participate in decision making related to treatment and care  Description: INTERVENTIONS:  - Identify decision maker  - Determine when there are differences among patient's view, family's view, and healthcare provider's view of patient condition and care goals  - Facilitate patient/family articulation of goals for care  - Help patient/family identify pros/cons of alternative solutions  - Provide information as requested by patient/family  - Respect patient/family rights related to privacy and sharing information   - Serve as a liaison between patient, family and health care team  - Initiate consults as appropriate (Ethics Team, Palliative Care, Family Care Conference, etc )  Outcome: Progressing     Problem: CONFUSION/THOUGHT DISTURBANCE  Goal: Thought disturbances (confusion, delirium, depression, dementia or psychosis) are managed to maintain or return to baseline mental status and functional level  Description: INTERVENTIONS:  - Assess for possible contributors to  thought disturbance, including but not limited to medications, infection, impaired vision or hearing, underlying metabolic abnormalities, dehydration, respiratory compromise,  psychiatric diagnoses and notify attending PHYSICAN/AP  - Monitor and intervene to maintain adequate nutrition, hydration, elimination, sleep and activity  - Decrease environmental stimuli, including noise as appropriate  - Provide frequent contacts to provide refocusing, direction and reassurance as needed  Approach patient calmly with eye contact and at their level    - [place] high risk fall precautions, aspiration precautions and other safety measures, as indicated  - If delirium suspected, notify physician/AP of change in condition and request immediate in-person evaluation  - Pursue consults as appropriate including Geriatric (campus dependent), OT for cognitive evaluation/activity planning, psychiatric, pastoral care, etc   Outcome: Progressing     Problem: BEHAVIOR  Goal: Pt/Family maintain appropriate behavior and adhere to behavioral management agreement, if implemented  Description: INTERVENTIONS:  - Assess the family dynamic   - Encourage verbalization of thoughts and concerns in a socially appropriate manner  - Assess patient/family's coping skills and non-compliant behavior (including use of illegal substances)  - Utilize positive, consistent limit setting strategies supporting safety of patient, staff and others  - Initiate consult with Case Management, Spiritual Care or other ancillary services as appropriate  - If a patient's/visitor's behavior jeopardizes the safety of the patient, staff, or others, refer to organization procedure  - Notify Security of behavior or suspected illegal substances which indicate the need for search of the patient and/or belongings  - Encourage participation in the decision making process about a behavioral management agreement; implement if patient meets criteria  Outcome: Progressing     Problem: Depression - IP adult  Goal: Effects of depression will be minimized  Description: INTERVENTIONS:  - Assess impact of patient's symptoms on level of functioning, self-care needs and offer support as indicated  - Assess patient/family knowledge of depression, impact on illness and need for teaching  - Provide emotional support, presence and reassurance  - Assess for possible suicidal thoughts, ideation or self-harm   If patient expresses suicidal thoughts or statements do not leave alone, notify physician/AP immediately, initiate Suicide Precautions, and determine need for continual observation   - Initiate consults and referrals as appropriate (a mental health professional, Spiritual Care)  - Administer medication as ordered  Outcome: Progressing     Problem: SELF HARM  Goal: Effect of psychiatric condition will be minimized and patient will be protected from self harm  Description: INTERVENTIONS:  - Assess impact of patient's symptoms on level of functioning, self-care needs and offer support as indicated  - Assess patient/family knowledge of depression, impact on illness and need for teaching  - Provide emotional support, presence and reassurance  - Assess for possible suicidal thoughts, ideation or self-harm  If patient expresses suicidal thoughts or statements do not leave alone, notify physician/AP immediately, initiate suicide precautions, and determine need for continual observation  - initiate consults and referrals as appropriate (a mental health professional, Spiritual Care  Outcome: Progressing     Problem: Nutrition/Hydration-ADULT  Goal: Nutrient/Hydration intake appropriate for improving, restoring or maintaining nutritional needs  Description: Monitor and assess patient's nutrition/hydration status for malnutrition  Collaborate with interdisciplinary team and initiate plan and interventions as ordered  Monitor patient's weight and dietary intake as ordered or per policy  Utilize nutrition screening tool and intervene as necessary  Determine patient's food preferences and provide high-protein, high-caloric foods as appropriate       INTERVENTIONS:  - Monitor oral intake, urinary output, labs, and treatment plans  - Assess nutrition and hydration status and recommend course of action  - Evaluate amount of meals eaten  - Assist patient with eating if necessary   - Allow adequate time for meals  - Recommend/ encourage appropriate diets, oral nutritional supplements, and vitamin/mineral supplements  - Order, calculate, and assess calorie counts as needed  - Recommend, monitor, and adjust tube feedings and TPN/PPN based on assessed needs  - Assess need for intravenous fluids  - Provide specific nutrition/hydration education as appropriate  - Include patient/family/caregiver in decisions related to nutrition  Outcome: Progressing

## 2022-08-21 NOTE — PROGRESS NOTES
[address]  Progress Note - Renee Yolanda 1980, 40 y o  female MRN: [id number]  Unit/Bed#: -01 Encounter: [id number]  Primary Care Provider: No primary care provider on file  Date and time admitted to hospital: 8/17/2022  3:09 AM    * Bipolar affective disorder, currently manic, mild ([address] )  Assessment & Plan  · Patient presented to the ED with complaints of paranoia  · Hx of bipolar  · Psych consult; appreciate input  · Recommending 302 in setting of patient lacking insight and judgement at this time  · Recommending 1:1 as indicated  · Zyprexa 10 mg daily as a mood stabilizer and for psychosis  · Can give 10 mg Zyprexa IM every 6 hours as needed, but not to exceed 30 mg in 24 hrs  · Psych re-consult recommending to pursue 302  · Patient is now medically cleared for inpatient psychiatric treatment  Non-ischemic myocardial injury (non-traumatic)  Assessment & Plan  · Present on admission  · Initial trop 78, 2 hr delta 420, 4 hr delta 629  · Cardiology consult, appreciate input  · Echo (8/17/22): Left ventricular cavity size is normal  Wall thickness is normal  The left ventricular ejection fraction is 65% by visual estimation  Systolic function is normal   Wall motion is normal   · Elevated troponin levels likely secondary to hypertension/tachycardia  Hypertension  Assessment & Plan  · History of hypertension; uncontrolled on admission  · Unfortunately patient is not taking her oral medications at this time  · Amlodipine and Coreg discontinued until patient is agreeable to taking orals  · Will continue 10 mg of IV labetalol every 6 hours around the clock  · Blood pressures have improved  · Will start patient on oral medications as soon as she is cooperative  VTE Pharmacologic Prophylaxis: VTE Score: 3 Moderate Risk (Score 3-4) - Pharmacological DVT Prophylaxis Ordered: enoxaparin (Lovenox)      Patient Centered Rounds: I performed bedside rounds with nursing staff today   Discussions with Specialists or Other Care Team Provider:     Education and Discussions with Family / Patient: Attempted to update  (significant other) via phone  Left voicemail  Time Spent for Care: 20 minutes  More than 50% of total time spent on counseling and coordination of care as described above  Current Length of Stay: 2 day(s)  Current Patient Status: Inpatient   Certification Statement: The patient will continue to require additional inpatient hospital stay due to ongoing treatment in setting of need for psych placement  Discharge Plan: TBD    Code Status: Level 1 - Full Code    Subjective:   Patient seen Ruben Kingston in bed, resting calmly  No acute complaints    Objective:     Vitals:   Temp (24hrs), Av 5 °F (36 9 °C), Min:98 5 °F (36 9 °C), Max:98 5 °F (36 9 °C)    Temp:  [98 5 °F (36 9 °C)] 98 5 °F (36 9 °C)  HR:  [] 113  Resp:  [18-22] 18  BP: (116-170)/() 143/93  SpO2:  [95 %-98 %] 98 %  Body mass index is 43 9 kg/m²  Input and Output Summary (last 24 hours): Intake/Output Summary (Last 24 hours) at 2022 1257  Last data filed at 2022 2333  Gross per 24 hour   Intake 480 ml   Output --   Net 480 ml       Physical Exam:   Physical Exam  Vitals and nursing note reviewed  Constitutional:       General: She is not in acute distress  Appearance: She is normal weight  She is ill-appearing  Cardiovascular:      Pulses: Normal pulses  Pulmonary:      Effort: Pulmonary effort is normal    Musculoskeletal:         General: Normal range of motion  Skin:     General: Skin is warm and dry  Neurological:      Mental Status: She is alert and oriented to person, place, and time  Psychiatric:         Mood and Affect: Affect is labile  Speech: Speech is rapid and pressured  Behavior: Behavior is uncooperative  Thought Content: Thought content is paranoid  Judgment: Judgment is impulsive and inappropriate  Additional Data:     Labs:  Results from last 7 days   Lab Units 08/17/22  0328   WBC Thousand/uL 13 15*   HEMOGLOBIN g/dL 14 8   HEMATOCRIT % 44 5   PLATELETS Thousands/uL 295   NEUTROS PCT % 79*   LYMPHS PCT % 13*   MONOS PCT % 8   EOS PCT % 0     Results from last 7 days   Lab Units 08/17/22  0328   SODIUM mmol/L 141   POTASSIUM mmol/L 3 9   CHLORIDE mmol/L 107   CO2 mmol/L 22   BUN mg/dL 18   CREATININE mg/dL 1 00   ANION GAP mmol/L 12   CALCIUM mg/dL 9 1   ALBUMIN g/dL 4 2   TOTAL BILIRUBIN mg/dL 0 19*   ALK PHOS U/L 99   ALT U/L 34   AST U/L 19   GLUCOSE RANDOM mg/dL 122     Results from last 7 days   Lab Units 08/17/22  0328   INR  1 06             Results from last 7 days   Lab Units 08/17/22  0542 08/17/22  0328   LACTIC ACID mmol/L 0 8 3 6*       Lines/Drains:  Invasive Devices  Report    Peripheral Intravenous Line  Duration           Peripheral IV 08/21/22 Left;Ventral (anterior) Forearm <1 day                      Imaging: No pertinent imaging reviewed  Recent Cultures (last 7 days):         Last 24 Hours Medication List:   Current Facility-Administered Medications   Medication Dose Route Frequency Provider Last Rate    carBAMazepine  400 mg Oral HS Raul Loera MD      dicyclomine  20 mg Oral BID Raul Loera MD      enoxaparin  40 mg Subcutaneous Daily Raul Loera MD      labetalol  10 mg Intravenous Q6H JONATHON Sherwood      nicotine  1 patch Transdermal Daily Raul Loera MD      OLANZapine  10 mg Intramuscular Q8H PRN Raul Loera MD      OLANZapine  10 mg Oral Daily Raul Loera MD          Today, Patient Was Seen By: JONATHON Sherwood    **Please Note: This note may have been constructed using a voice recognition system  **

## 2022-08-21 NOTE — ED NOTES
CW notes, 2 physician 302 commitment has been completed  CW attempted to review 302 commitment and read rights w/pt; however, pt does not appear to understand at this time      TDS, CW

## 2022-08-21 NOTE — ED NOTES
CW attempted to speak with pt to assess mental status and offer 201  Pt was standing upon arrival and appeared anxious  The 1:1 tech relayed that pt has not been sleeping nor sitting down for hours and has been anxious, as well intermittently responding to questions  Pt was reported to be calm and cooperative  CW was  introduced to pt and asked if it was ok to sit and talk with her, pt responded "no"  CW asked pt if she was nervous, but pt did not respond and looked away  CW attempted contact once more, but pt was not responding  The 1:1 relayed that pt's care team were not on duty during this time to discuss concerns  CW to follow-up at a later time       [place], [address]  8/20/22; 22:20

## 2022-08-21 NOTE — ASSESSMENT & PLAN NOTE
· History of hypertension; uncontrolled on admission  · Unfortunately patient is not taking her oral medications at this time  · Amlodipine and Coreg discontinued until patient is agreeable to taking orals  · Will continue 10 mg of IV labetalol every 6 hours around the clock  · Blood pressures have improved  · Will start patient on oral medications as soon as she is cooperative

## 2022-08-21 NOTE — ED NOTES
CW spoke with: Diann DE LA CRUZ of Intake - No in-network beds tonight  [facility] - [facility]; [id number] [employer] faxed chart for review      Flor Marroquin CW  08/21/22 18:08

## 2022-08-21 NOTE — PLAN OF CARE
Problem: MOBILITY - ADULT  Goal: Maintain or return to baseline ADL function  Description: INTERVENTIONS:  -  Assess patient's ability to carry out ADLs; assess patient's baseline for ADL function and identify physical deficits which impact ability to perform ADLs (bathing, care of mouth/teeth, toileting, grooming, dressing, etc )  - Assess/evaluate cause of self-care deficits   - Assess range of motion  - Assess patient's mobility; develop plan if impaired  - Assess patient's need for assistive devices and provide as appropriate  - Encourage maximum independence but intervene and supervise when necessary  - Involve family in performance of ADLs  - Assess for home care needs following discharge   - Consider OT consult to assist with ADL evaluation and planning for discharge  - Provide patient education as appropriate  Outcome: Progressing  Goal: Maintains/Returns to pre admission functional level  Description: INTERVENTIONS:  - Perform BMAT or MOVE assessment daily    - Set and communicate daily mobility goal to care team and patient/family/caregiver  - Collaborate with rehabilitation services on mobility goals if consulted  - Perform Range of Motion 3 times a day  - Reposition patient every 2 hours    - Dangle patient 3 times a day  - Stand patient 3 times a day  - Ambulate patient 3 times a day  - Out of bed to chair 3 times a day   - Out of bed for meals 3 times a day  - Out of bed for toileting  - Record patient progress and toleration of activity level   Outcome: Progressing     Problem: Potential for Falls  Goal: Patient will remain free of falls  Description: INTERVENTIONS:  - Educate patient/family on patient safety including physical limitations  - Instruct patient to call for assistance with activity   - Consult OT/PT to assist with strengthening/mobility   - Keep Call bell within reach  - Keep bed low and locked with side rails adjusted as appropriate  - Keep care items and personal belongings within reach  - Initiate and maintain comfort rounds  - Make Fall Risk Sign visible to staff  - Offer Toileting every 2 Hours, in advance of need  - Initiate/Maintain bedalarm  - Obtain necessary fall risk management equipment:   - Apply yellow socks and bracelet for high fall risk patients  - Consider moving patient to room near nurses station  Outcome: Progressing     Problem: Prexisting or High Potential for Compromised Skin Integrity  Goal: Skin integrity is maintained or improved  Description: INTERVENTIONS:  - Identify patients at risk for skin breakdown  - Assess and monitor skin integrity  - Assess and monitor nutrition and hydration status  - Monitor labs   - Assess for incontinence   - Turn and reposition patient  - Assist with mobility/ambulation  - Relieve pressure over bony prominences  - Avoid friction and shearing  - Provide appropriate hygiene as needed including keeping skin clean and dry  - Evaluate need for skin moisturizer/barrier cream  - Collaborate with interdisciplinary team   - Patient/family teaching  - Consider wound care consult   Outcome: Progressing     Problem: COPING  Goal: Pt/Family able to verbalize concerns and demonstrate effective coping strategies  Description: INTERVENTIONS:  - Assist patient/family to identify coping skills, available support systems and cultural and spiritual values  - Provide emotional support, including active listening and acknowledgement of concerns of patient and caregivers  - Reduce environmental stimuli, as able  - Provide patient education  - Assess for spiritual pain/suffering and initiate spiritual care, including notification of Pastoral Care or jazmin based community as needed  - Assess effectiveness of coping strategies  Outcome: Progressing  Goal: Will report anxiety at manageable levels  Description: INTERVENTIONS:  - Administer medication as ordered  - Teach and encourage coping skills  - Provide emotional support  - Assess patient/family for anxiety and ability to cope  Outcome: Progressing     Problem: DECISION MAKING  Goal: Pt/Family able to effectively weigh alternatives and participate in decision making related to treatment and care  Description: INTERVENTIONS:  - Identify decision maker  - Determine when there are differences among patient's view, family's view, and healthcare provider's view of patient condition and care goals  - Facilitate patient/family articulation of goals for care  - Help patient/family identify pros/cons of alternative solutions  - Provide information as requested by patient/family  - Respect patient/family rights related to privacy and sharing information   - Serve as a liaison between patient, family and health care team  - Initiate consults as appropriate (Ethics Team, Palliative Care, Family Care Conference, etc )  Outcome: Progressing     Problem: CONFUSION/THOUGHT DISTURBANCE  Goal: Thought disturbances (confusion, delirium, depression, dementia or psychosis) are managed to maintain or return to baseline mental status and functional level  Description: INTERVENTIONS:  - Assess for possible contributors to  thought disturbance, including but not limited to medications, infection, impaired vision or hearing, underlying metabolic abnormalities, dehydration, respiratory compromise,  psychiatric diagnoses and notify attending PHYSICAN/AP  - Monitor and intervene to maintain adequate nutrition, hydration, elimination, sleep and activity  - Decrease environmental stimuli, including noise as appropriate  - Provide frequent contacts to provide refocusing, direction and reassurance as needed  Approach patient calmly with eye contact and at their level    - [place] high risk fall precautions, aspiration precautions and other safety measures, as indicated  - If delirium suspected, notify physician/AP of change in condition and request immediate in-person evaluation  - Pursue consults as appropriate including Geriatric (campus dependent), OT for cognitive evaluation/activity planning, psychiatric, pastoral care, etc   Outcome: Progressing     Problem: BEHAVIOR  Goal: Pt/Family maintain appropriate behavior and adhere to behavioral management agreement, if implemented  Description: INTERVENTIONS:  - Assess the family dynamic   - Encourage verbalization of thoughts and concerns in a socially appropriate manner  - Assess patient/family's coping skills and non-compliant behavior (including use of illegal substances)  - Utilize positive, consistent limit setting strategies supporting safety of patient, staff and others  - Initiate consult with Case Management, Spiritual Care or other ancillary services as appropriate  - If a patient's/visitor's behavior jeopardizes the safety of the patient, staff, or others, refer to organization procedure  - Notify Security of behavior or suspected illegal substances which indicate the need for search of the patient and/or belongings  - Encourage participation in the decision making process about a behavioral management agreement; implement if patient meets criteria  Outcome: Progressing     Problem: Depression - IP adult  Goal: Effects of depression will be minimized  Description: INTERVENTIONS:  - Assess impact of patient's symptoms on level of functioning, self-care needs and offer support as indicated  - Assess patient/family knowledge of depression, impact on illness and need for teaching  - Provide emotional support, presence and reassurance  - Assess for possible suicidal thoughts, ideation or self-harm   If patient expresses suicidal thoughts or statements do not leave alone, notify physician/AP immediately, initiate Suicide Precautions, and determine need for continual observation   - Initiate consults and referrals as appropriate (a mental health professional, Spiritual Care)  - Administer medication as ordered  Outcome: Progressing     Problem: SELF HARM  Goal: Effect of psychiatric condition will be minimized and patient will be protected from self harm  Description: INTERVENTIONS:  - Assess impact of patient's symptoms on level of functioning, self-care needs and offer support as indicated  - Assess patient/family knowledge of depression, impact on illness and need for teaching  - Provide emotional support, presence and reassurance  - Assess for possible suicidal thoughts, ideation or self-harm  If patient expresses suicidal thoughts or statements do not leave alone, notify physician/AP immediately, initiate suicide precautions, and determine need for continual observation  - initiate consults and referrals as appropriate (a mental health professional, Spiritual Care  Outcome: Progressing     Problem: Nutrition/Hydration-ADULT  Goal: Nutrient/Hydration intake appropriate for improving, restoring or maintaining nutritional needs  Description: Monitor and assess patient's nutrition/hydration status for malnutrition  Collaborate with interdisciplinary team and initiate plan and interventions as ordered  Monitor patient's weight and dietary intake as ordered or per policy  Utilize nutrition screening tool and intervene as necessary  Determine patient's food preferences and provide high-protein, high-caloric foods as appropriate       INTERVENTIONS:  - Monitor oral intake, urinary output, labs, and treatment plans  - Assess nutrition and hydration status and recommend course of action  - Evaluate amount of meals eaten  - Assist patient with eating if necessary   - Allow adequate time for meals  - Recommend/ encourage appropriate diets, oral nutritional supplements, and vitamin/mineral supplements  - Order, calculate, and assess calorie counts as needed  - Recommend, monitor, and adjust tube feedings and TPN/PPN based on assessed needs  - Assess need for intravenous fluids  - Provide specific nutrition/hydration education as appropriate  - Include patient/family/caregiver in decisions related to nutrition  Outcome: Progressing

## 2022-08-22 VITALS
BODY MASS INDEX: 43.71 KG/M2 | DIASTOLIC BLOOD PRESSURE: 93 MMHG | HEIGHT: 66 IN | WEIGHT: 272 LBS | HEART RATE: 94 BPM | TEMPERATURE: 98.7 F | OXYGEN SATURATION: 94 % | RESPIRATION RATE: 18 BRPM | SYSTOLIC BLOOD PRESSURE: 148 MMHG

## 2022-08-22 PROCEDURE — 99238 HOSP IP/OBS DSCHRG MGMT 30/<: CPT | Performed by: PHYSICIAN ASSISTANT

## 2022-08-22 RX ADMIN — NICOTINE 1 PATCH: 21 PATCH, EXTENDED RELEASE TRANSDERMAL at 10:22

## 2022-08-22 RX ADMIN — LABETALOL HYDROCHLORIDE 10 MG: 5 INJECTION, SOLUTION INTRAVENOUS at 10:24

## 2022-08-22 RX ADMIN — LABETALOL HYDROCHLORIDE 10 MG: 5 INJECTION, SOLUTION INTRAVENOUS at 04:35

## 2022-08-22 RX ADMIN — ENOXAPARIN SODIUM 40 MG: 40 INJECTION SUBCUTANEOUS at 10:32

## 2022-08-22 NOTE — ED NOTES
Patient is accepted at [facility]  Patient is accepted by Dr Lul Baron per Joel  Transportation is arranged with **     Transportation is scheduled for **  Patient may go to the floor after 10:00 AM       Aubrie Alcantar is working on scheduling a p/u time with CTS for tomorrow morning        Mukul Parkmary, [facility], [address]  08/21/22 21:17

## 2022-08-22 NOTE — ED NOTES
Patient is accepted at [facility]  Patient is accepted by Dr Ashtyn Quintero        Transportation is arranged with [address] per [facility] at [facility]  Transportation is scheduled for 11am      Transport packet tubed up to [facility], on [address]  [facility] aware that patient will be p/u shortly  No N2N Report is required         Flor Whittaker, PEGGY  08/22/22 21:17

## 2022-08-22 NOTE — CASE MANAGEMENT
Case Management Discharge Planning Note    Patient name Jana Kenney  Location [id number] MRN [id number]  : 1980 Date 2022       Current Admission Date: 2022  Current Admission Diagnosis:Bipolar affective disorder, currently manic, mild ([facility] [address] )   Patient Active Problem List    Diagnosis Date Noted    Non-ischemic myocardial injury (non-traumatic) 2022    Hypertension 2021    Bipolar affective disorder, currently manic, mild ([facility] [address] ) 2021    Nasal congestion 2021    Back pain 2021    Cracking skin 2021    Cannabis abuse 2017    Bipolar I disorder, most recent episode mixed, severe with psychotic features ([facility] ) 2017    Suicidal thoughts 2017      LOS (days): 3  Geometric Mean LOS (GMLOS) (days): 2 80  Days to GMLOS:-0 1     OBJECTIVE:  Risk of Unplanned Readmission Score: 11 5      Current admission status: Inpatient   Preferred Pharmacy:   [address] [place], [place] [id number] R R 1 ([phone] R R [phone])  [facility]  Phone: [phone] Fax: [phone]    Primary Care Provider: No primary care provider on file  Primary Insurance: [employer] MEDICARE REP  Secondary Insurance: [address]    DISCHARGE DETAILS:     Additional Comments: Per the medical team, the patient is medically ready to discharge to in patient Psych today  Per crisis the patient is all set to be discharge to [facility] in patient Psych  Transportation has been set up for 10:30am  The patients bedside nurse and the medical team are ware

## 2022-08-22 NOTE — ED NOTES
CW completed COB on pt with Carla Hughes of [facility]  CW informed Herson Og of [facility] of the above and of the pre-cert attempt      Jeramie Palumbo, [facility], [address]  08/21/22 21:37

## 2022-08-22 NOTE — ED NOTES
Insurance Authorization for admission:   Phone call placed to [employer] (Valerio Tapia)  Phone number: [phone]  Option 4, 3,   Spoke to Crovat  who requested clinicals be faxed to [phone]    3 days approved  Level of care: 302  Review on 8/24  Authorization # [id number]  On day 3, UR to contact # above to either d/c, or complete concurrent review  Ayana najera  Admissions aware that transport is arranged for 10am, and was provided "[employer]  Marisa Jarquin's UR:    [address]  [phone] Phone  [phone] Fax  Tax ID: [id number]  NPI: [id number]    EVS (Eligibility Verification System) called - [phone]  Automated system indicates: MA eligible  Insurance Authorization for Transportation:    Not required for CTS       Jeff Winston, [facility]  08/22/22  9248

## 2022-08-22 NOTE — ASSESSMENT & PLAN NOTE
· Patient presented to the ED with complaints of paranoia  · Hx of bipolar  · Psych consult; appreciate input  · Recommending 302 in setting of patient lacking insight and judgement at this time  · Recommending 1:1 as indicated  · Currently on tegretol 400 mg HS, zyprexa 10 mg daily  · Home regimen listed as zyprexa 15 mg HS, seroquel 200 mg BID, hydroxyzine 50 mg q12h prn anxiety  · Patient is medically cleared for inpatient psychiatric treatment

## 2022-08-22 NOTE — ED NOTES
Insurance Authorization for admission:   Phone call placed to [employer] [facility]  Phone number: [phone]  Spoke to [facility]  ** days approved  Level of care: 302  Review on **  Authorization # **         CW attempted to complete pre-cert with Oswaldo Rasmussen, but she noted the pre-cert dep't is closed on weekends, but is open Mon - Fri from 8 AM - 8 PM  AM CW to call in the morning to complete pre-cert  Gayatri Jarquin's UR:    [address]  [phone] Phone  [phone] Fax  Tax ID: [id number]  NPI: [id number]    EVS (Eligibility Verification System) called - [phone]  Automated system indicates: MA eligible      Insurance Authorization for Transportation:      Dashawn Regalado [place], [place]  08/21/22 21:36

## 2022-08-22 NOTE — DISCHARGE SUMMARY
[facility] Discharge- [facility] 1980, 40 y o  female MRN: [id number]  Unit/Bed#: -01 Encounter: [id number]  Primary Care Provider: No primary care provider on file  Date and time admitted to hospital: 8/17/2022  3:09 AM    * Bipolar affective disorder, currently manic, mild ([address] )  Assessment & Plan  · Patient presented to the ED with complaints of paranoia  · Hx of bipolar  · Psych consult; appreciate input  · Recommending 302 in setting of patient lacking insight and judgement at this time  · Recommending 1:1 as indicated  · Currently on tegretol 400 mg HS, zyprexa 10 mg daily  · Home regimen listed as zyprexa 15 mg HS, seroquel 200 mg BID, hydroxyzine 50 mg q12h prn anxiety  · Patient is medically cleared for inpatient psychiatric treatment  Non-ischemic myocardial injury (non-traumatic)  Assessment & Plan  · Present on admission  · Initial trop 78, 2 hr delta 420, 4 hr delta 629  · Cardiology consult, appreciate input  · Echo (8/17/22): Left ventricular cavity size is normal  Wall thickness is normal  The left ventricular ejection fraction is 65% by visual estimation  Systolic function is normal   Wall motion is normal   · Elevated troponin levels likely secondary to hypertension/tachycardia  · No further inpatient workup recommended     Hypertension  Assessment & Plan  · History of hypertension; uncontrolled on admission  · Unfortunately patient not taking her oral medications at time of presentation and still refusing   · Amlodipine and Coreg per home regimen  · Restart patient on oral medications as soon as she is cooperative  Medical Problems             Resolved Problems  Date Reviewed: 8/22/2022   None               Discharging Physician / Practitioner: Tani Dunn PA-C  PCP: No primary care provider on file    Admission Date:   Admission Orders (From admission, onward)     Ordered        08/19/22 1305  Inpatient Admission  Once 08/17/22 0617  Place in Observation  Once                      Discharge Date: 08/22/22    Consultations During Hospital Stay:  IP CONSULT TO CARDIOLOGY  IP CONSULT TO PSYCHIATRY  IP CONSULT TO PSYCHIATRY  · IP CONSULT TO ED CRISIS WORKER     Procedures Performed:   · None     Significant Findings / Test Results:   CT head without contrast   Final Result by Andry Lynne MD (08/17 0443)      No acute intracranial abnormality  Workstation performed: [id number]              Incidental Findings:   · None      Test Results Pending at Discharge (will require follow up): · None      Outpatient Tests Requested:  · Psych follow up  · PCP follow up     Complications:  None     Reason for Admission: paranoia, bipolar     Hospital Course:   Breanna Schaeffer is a 40 y o  female patient who originally presented to the hospital on 8/17/2022 due to acute paranoia  Patient is a history of bipolar disorder, she was unable to provide any history on presentation but kept stating I am going to snf on   According to her significant other, patient was fine in the morning but started showing erratic behavior, agitation and paranoia  For these reasons she was brought into the hospital by EMS  Patient was seen in consultation by Psychiatry x2, she is medically stable for discharge at this time  She does require inpatient psychiatry treatment, under 302 given patient's resistance to recommendation and imminent threat to self  Please see above list of diagnoses and related plan for additional information  Condition at Discharge: stable    Discharge Day Visit / Exam:   * Please refer to separate progress note for these details *  Patient was transferred to psychiatry treatment center prior to my being able to physically evaluate her    /92   Pulse 88   Temp 98 7 °F (37 1 °C)   Resp 18   Ht 5' 6" (1 676 m)   Wt 123 kg (272 lb)   SpO2 95%   BMI 43 90 kg/m²       Discussion with Family: Attempted to update  (significant other) via phone  Left voicemail  Discharge instructions/Information to patient and family:   See after visit summary for information provided to patient and family  Provisions for Follow-Up Care:  See after visit summary for information related to follow-up care and any pertinent home health orders  Disposition:   Inpatient Psychiatry at [facility]    Planned Readmission: none      Discharge Statement:  I spent 20 minutes discharging the patient  This time was spent on the day of discharge  I had direct contact with the patient on the day of discharge  Greater than 50% of the total time was spent examining patient, answering all patient questions, arranging and discussing plan of care with patient as well as directly providing post-discharge instructions  Additional time then spent on discharge activities  Discharge Medications:  See after visit summary for reconciled discharge medications provided to patient and/or family        **Please Note: This note may have been constructed using a voice recognition system**

## 2022-08-22 NOTE — ASSESSMENT & PLAN NOTE
· History of hypertension; uncontrolled on admission  · Unfortunately patient not taking her oral medications at time of presentation and still refusing   · Amlodipine and Coreg per home regimen  · Restart patient on oral medications as soon as she is cooperative

## 2022-08-22 NOTE — ED NOTES
CW faxed pt's [phone] petition, Rights, CRF and ACT 77 to DS      Rayna Vieira, [facility], [address]  08/21/22 22:54

## 2022-08-24 NOTE — ED NOTES
8/24/22 - 1315: CW received call from [address] [place] [address]  Insurance Authorization for admission:   Phone call placed to [place] [address]  Phone number: [phone]     Spoke to Horn Sachin     4 days approved  Level of care: 302  Review on 8/25/2022   Authorization # [id number]        EVS (Eligibility Verification System) called - [phone]  Automated system indicates: **    Insurance Authorization for Transportation:    Phone call placed to **  Phone number **  Spoke to **     Authorization #: **    TDS, CW

## 2022-08-24 NOTE — UTILIZATION REVIEW
Juwan Peguero PA-C   Physician Assistant   Hospitalist   Discharge Summary       Attested Addendum   Date of Service:  8/22/2022  9:41 AM                     Attestation signed by Hugo Hooker MD at 8/22/2022  6:46 PM     I did not see or examine the patient myself  The patient was independently seen and assessed by the advanced practitioner  The A&P was independently formulated by the advanced practitioner  I am co-singing this note for administrative purposes only after the fact  Show:Clear all  [x]Manual[x]Template[x]Copied    Added by:  Jessica Suh PA-C      []Jeffrey for details    [facility] Discharge- Yeny Carrion 1980, 40 y o  female MRN: [id number]  Unit/Bed#: -01 Encounter: [id number]  Primary Care Provider: No primary care provider on file  Date and time admitted to hospital: 8/17/2022  3:09 AM     * Bipolar affective disorder, currently manic, mild ([address] )  Assessment & Plan  · Patient presented to the ED with complaints of paranoia  · Hx of bipolar  · Psych consult; appreciate input  ? Recommending 302 in setting of patient lacking insight and judgement at this time  ? Recommending 1:1 as indicated  ? Currently on tegretol 400 mg HS, zyprexa 10 mg daily  § Home regimen listed as zyprexa 15 mg HS, seroquel 200 mg BID, hydroxyzine 50 mg q12h prn anxiety  § Patient is medically cleared for inpatient psychiatric treatment      Non-ischemic myocardial injury (non-traumatic)  Assessment & Plan  · Present on admission  · Initial trop 78, 2 hr delta 420, 4 hr delta 629  · Cardiology consult, appreciate input  ? Echo (8/17/22): Left ventricular cavity size is normal  Wall thickness is normal  The left ventricular ejection fraction is 65% by visual estimation  Systolic function is normal   Wall motion is normal   ? Elevated troponin levels likely secondary to hypertension/tachycardia  ?  No further inpatient workup recommended    Hypertension  Assessment & Plan  · History of hypertension; uncontrolled on admission  · Unfortunately patient not taking her oral medications at time of presentation and still refusing   ? Amlodipine and Coreg per home regimen  ? Restart patient on oral medications as soon as she is cooperative                Medical Problems                  Resolved Problems  Date Reviewed: 8/22/2022   None                   Discharging Physician / Practitioner: Cathy Orr PA-C  PCP: No primary care provider on file  Admission Date:       Admission Orders (From admission, onward)              Ordered          08/19/22 1305   Inpatient Admission  Once              08/17/22 0617   Place in Observation  Once                          Discharge Date: 08/22/22     Consultations During Hospital Stay:  · IP CONSULT TO CARDIOLOGY  · IP CONSULT TO PSYCHIATRY  · IP CONSULT TO PSYCHIATRY  · IP CONSULT TO ED CRISIS WORKER      Procedures Performed:   · None      Significant Findings / Test Results:   CT head without contrast   Final Result by Claudia Parrish MD (08/17 0443)       No acute intracranial abnormality                        Workstation performed: [id number]                 Incidental Findings:   · None       Test Results Pending at Discharge (will require follow up): · None      Outpatient Tests Requested:  · Psych follow up  · PCP follow up      Complications:  None      Reason for Admission: paranoia, bipolar      Hospital Course:   Becki Henry is a 40 y o  female patient who originally presented to the hospital on 8/17/2022 due to acute paranoia  Patient is a history of bipolar disorder, she was unable to provide any history on presentation but kept stating I am going to nursing home on   According to her significant other, patient was fine in the morning but started showing erratic behavior, agitation and paranoia  For these reasons she was brought into the hospital by EMS       Patient was seen in consultation by Psychiatry x2, she is medically stable for discharge at this time  She does require inpatient psychiatry treatment, under 302 given patient's resistance to recommendation and imminent threat to self         Please see above list of diagnoses and related plan for additional information       Condition at Discharge: stable     Discharge Day Visit / Exam:   * Please refer to separate progress note for these details *  Patient was transferred to psychiatry treatment center prior to my being able to physically evaluate her  /92   Pulse 88   Temp 98 7 °F (37 1 °C)   Resp 18   Ht 5' 6" (1 676 m)   Wt 123 kg (272 lb)   SpO2 95%   BMI 43 90 kg/m²         Discussion with Family: Attempted to update  (significant other) via phone  Left voicemail       Discharge instructions/Information to patient and family:   See after visit summary for information provided to patient and family        Provisions for Follow-Up Care:  See after visit summary for information related to follow-up care and any pertinent home health orders  Disposition:   Inpatient Psychiatry at [facility]     Planned Readmission: none      Discharge Statement:  I spent 20 minutes discharging the patient  This time was spent on the day of discharge  I had direct contact with the patient on the day of discharge  Greater than 50% of the total time was spent examining patient, answering all patient questions, arranging and discussing plan of care with patient as well as directly providing post-discharge instructions    Additional time then spent on discharge activities      Discharge Medications:  See after visit summary for reconciled discharge medications provided to patient and/or family        **Please Note: This note may have been constructed using a voice recognition system**         Cosigned by: Denisha Moura MD at 8/22/2022  6:46 PM       Revision History

## 2022-11-02 NOTE — NURSING NOTE
Patient discharged at this time for transport to inpatient facilty  Include Pregnancy/Lactation Warning?: No

## 2023-09-25 ENCOUNTER — APPOINTMENT (OUTPATIENT)
Dept: LAB | Facility: HOSPITAL | Age: 43
End: 2023-09-25
Payer: MEDICARE

## 2023-09-25 DIAGNOSIS — Z00.00 ROUTINE GENERAL MEDICAL EXAMINATION AT A HEALTH CARE FACILITY: ICD-10-CM

## 2023-09-25 LAB
ALBUMIN SERPL BCP-MCNC: 4.3 G/DL (ref 3.5–5)
ALP SERPL-CCNC: 62 U/L (ref 34–104)
ALT SERPL W P-5'-P-CCNC: 20 U/L (ref 7–52)
ANION GAP SERPL CALCULATED.3IONS-SCNC: 4 MMOL/L
AST SERPL W P-5'-P-CCNC: 16 U/L (ref 13–39)
BASOPHILS # BLD AUTO: 0.03 THOUSANDS/ÂΜL (ref 0–0.1)
BASOPHILS NFR BLD AUTO: 1 % (ref 0–1)
BILIRUB SERPL-MCNC: 0.46 MG/DL (ref 0.2–1)
BUN SERPL-MCNC: 13 MG/DL (ref 5–25)
CALCIUM SERPL-MCNC: 9.1 MG/DL (ref 8.4–10.2)
CHLORIDE SERPL-SCNC: 111 MMOL/L (ref 96–108)
CHOLEST SERPL-MCNC: 222 MG/DL
CO2 SERPL-SCNC: 24 MMOL/L (ref 21–32)
CREAT SERPL-MCNC: 0.99 MG/DL (ref 0.6–1.3)
EOSINOPHIL # BLD AUTO: 0.09 THOUSAND/ÂΜL (ref 0–0.61)
EOSINOPHIL NFR BLD AUTO: 2 % (ref 0–6)
ERYTHROCYTE [DISTWIDTH] IN BLOOD BY AUTOMATED COUNT: 12.5 % (ref 11.6–15.1)
GFR SERPL CREATININE-BSD FRML MDRD: 70 ML/MIN/1.73SQ M
GLUCOSE P FAST SERPL-MCNC: 106 MG/DL (ref 65–99)
HCT VFR BLD AUTO: 45.2 % (ref 34.8–46.1)
HDLC SERPL-MCNC: 51 MG/DL
HGB BLD-MCNC: 14.7 G/DL (ref 11.5–15.4)
IMM GRANULOCYTES # BLD AUTO: 0.01 THOUSAND/UL (ref 0–0.2)
IMM GRANULOCYTES NFR BLD AUTO: 0 % (ref 0–2)
LDLC SERPL CALC-MCNC: 139 MG/DL (ref 0–100)
LYMPHOCYTES # BLD AUTO: 1.87 THOUSANDS/ÂΜL (ref 0.6–4.47)
LYMPHOCYTES NFR BLD AUTO: 30 % (ref 14–44)
MCH RBC QN AUTO: 29.4 PG (ref 26.8–34.3)
MCHC RBC AUTO-ENTMCNC: 32.5 G/DL (ref 31.4–37.4)
MCV RBC AUTO: 90 FL (ref 82–98)
MONOCYTES # BLD AUTO: 0.43 THOUSAND/ÂΜL (ref 0.17–1.22)
MONOCYTES NFR BLD AUTO: 7 % (ref 4–12)
NEUTROPHILS # BLD AUTO: 3.74 THOUSANDS/ÂΜL (ref 1.85–7.62)
NEUTS SEG NFR BLD AUTO: 60 % (ref 43–75)
NONHDLC SERPL-MCNC: 171 MG/DL
NRBC BLD AUTO-RTO: 0 /100 WBCS
PLATELET # BLD AUTO: 294 THOUSANDS/UL (ref 149–390)
PMV BLD AUTO: 10.5 FL (ref 8.9–12.7)
POTASSIUM SERPL-SCNC: 4.2 MMOL/L (ref 3.5–5.3)
PROT SERPL-MCNC: 7 G/DL (ref 6.4–8.4)
RBC # BLD AUTO: 5 MILLION/UL (ref 3.81–5.12)
SODIUM SERPL-SCNC: 139 MMOL/L (ref 135–147)
TRIGL SERPL-MCNC: 161 MG/DL
WBC # BLD AUTO: 6.17 THOUSAND/UL (ref 4.31–10.16)

## 2023-09-25 PROCEDURE — 80061 LIPID PANEL: CPT

## 2023-09-25 PROCEDURE — 36415 COLL VENOUS BLD VENIPUNCTURE: CPT

## 2023-09-25 PROCEDURE — 80053 COMPREHEN METABOLIC PANEL: CPT

## 2023-09-25 PROCEDURE — 83036 HEMOGLOBIN GLYCOSYLATED A1C: CPT

## 2023-09-25 PROCEDURE — 85025 COMPLETE CBC W/AUTO DIFF WBC: CPT

## 2023-09-26 LAB
EST. AVERAGE GLUCOSE BLD GHB EST-MCNC: 103 MG/DL
HBA1C MFR BLD: 5.2 %

## 2024-04-22 ENCOUNTER — APPOINTMENT (OUTPATIENT)
Dept: LAB | Facility: HOSPITAL | Age: 44
End: 2024-04-22
Payer: MEDICARE

## 2024-04-22 DIAGNOSIS — E11.9 TYPE 2 DIABETES MELLITUS WITHOUT COMPLICATION, WITH LONG-TERM CURRENT USE OF INSULIN (HCC): ICD-10-CM

## 2024-04-22 DIAGNOSIS — Z79.4 TYPE 2 DIABETES MELLITUS WITHOUT COMPLICATION, WITH LONG-TERM CURRENT USE OF INSULIN (HCC): ICD-10-CM

## 2024-04-22 LAB
CREAT UR-MCNC: 69.3 MG/DL
MICROALBUMIN UR-MCNC: <7 MG/L
MICROALBUMIN/CREAT 24H UR: <10 MG/G CREATININE (ref 0–30)

## 2024-04-22 PROCEDURE — 82043 UR ALBUMIN QUANTITATIVE: CPT

## 2024-04-22 PROCEDURE — 82570 ASSAY OF URINE CREATININE: CPT

## 2024-05-16 NOTE — ASSESSMENT & PLAN NOTE
· Present on admission  · Initial trop 78, 2 hr delta 420, 4 hr delta 629  · Cardiology consult, appreciate input  · Echo (8/17/22): Left ventricular cavity size is normal  Wall thickness is normal  The left ventricular ejection fraction is 65% by visual estimation  Systolic function is normal   Wall motion is normal   · Elevated troponin levels likely secondary to hypertension/tachycardia    · No further inpatient workup recommended 190

## 2024-07-16 ENCOUNTER — APPOINTMENT (EMERGENCY)
Dept: CT IMAGING | Facility: HOSPITAL | Age: 44
End: 2024-07-16
Payer: MEDICARE

## 2024-07-16 ENCOUNTER — HOSPITAL ENCOUNTER (EMERGENCY)
Facility: HOSPITAL | Age: 44
Discharge: HOME | End: 2024-07-16
Attending: EMERGENCY MEDICINE | Admitting: EMERGENCY MEDICINE
Payer: MEDICARE

## 2024-07-16 ENCOUNTER — APPOINTMENT (EMERGENCY)
Dept: RADIOLOGY | Facility: HOSPITAL | Age: 44
End: 2024-07-16
Payer: MEDICARE

## 2024-07-16 VITALS
RESPIRATION RATE: 18 BRPM | DIASTOLIC BLOOD PRESSURE: 72 MMHG | SYSTOLIC BLOOD PRESSURE: 115 MMHG | OXYGEN SATURATION: 100 % | WEIGHT: 246 LBS | HEART RATE: 65 BPM | BODY MASS INDEX: 40.98 KG/M2 | HEIGHT: 65 IN | TEMPERATURE: 98 F

## 2024-07-16 DIAGNOSIS — S20.211A RIB CONTUSION, RIGHT, INITIAL ENCOUNTER: Primary | ICD-10-CM

## 2024-07-16 DIAGNOSIS — M79.605 LEFT LEG PAIN: ICD-10-CM

## 2024-07-16 LAB
ANION GAP SERPL CALCULATED.3IONS-SCNC: 7 MMOL/L (ref 4–13)
BUN SERPL-MCNC: 12 MG/DL (ref 5–25)
CALCIUM SERPL-MCNC: 9.1 MG/DL (ref 8.4–10.2)
CHLORIDE SERPL-SCNC: 112 MMOL/L (ref 96–108)
CO2 SERPL-SCNC: 22 MMOL/L (ref 21–32)
CREAT SERPL-MCNC: 0.94 MG/DL (ref 0.6–1.3)
GFR SERPL CREATININE-BSD FRML MDRD: 74 ML/MIN/1.73SQ M
GLUCOSE SERPL-MCNC: 95 MG/DL (ref 65–140)
HCG SERPL QL: NEGATIVE
POTASSIUM SERPL-SCNC: 4.1 MMOL/L (ref 3.5–5.3)
SODIUM SERPL-SCNC: 141 MMOL/L (ref 135–147)

## 2024-07-16 PROCEDURE — 99284 EMERGENCY DEPT VISIT MOD MDM: CPT | Performed by: EMERGENCY MEDICINE

## 2024-07-16 PROCEDURE — 96374 THER/PROPH/DIAG INJ IV PUSH: CPT

## 2024-07-16 PROCEDURE — 73552 X-RAY EXAM OF FEMUR 2/>: CPT

## 2024-07-16 PROCEDURE — 84703 CHORIONIC GONADOTROPIN ASSAY: CPT | Performed by: EMERGENCY MEDICINE

## 2024-07-16 PROCEDURE — 36415 COLL VENOUS BLD VENIPUNCTURE: CPT | Performed by: EMERGENCY MEDICINE

## 2024-07-16 PROCEDURE — 71260 CT THORAX DX C+: CPT

## 2024-07-16 PROCEDURE — 80048 BASIC METABOLIC PNL TOTAL CA: CPT | Performed by: EMERGENCY MEDICINE

## 2024-07-16 PROCEDURE — 99284 EMERGENCY DEPT VISIT MOD MDM: CPT

## 2024-07-16 PROCEDURE — 74177 CT ABD & PELVIS W/CONTRAST: CPT

## 2024-07-16 RX ORDER — KETOROLAC TROMETHAMINE 30 MG/ML
15 INJECTION, SOLUTION INTRAMUSCULAR; INTRAVENOUS ONCE
Status: COMPLETED | OUTPATIENT
Start: 2024-07-16 | End: 2024-07-16

## 2024-07-16 RX ADMIN — IOHEXOL 100 ML: 350 INJECTION, SOLUTION INTRAVENOUS at 17:19

## 2024-07-16 RX ADMIN — KETOROLAC TROMETHAMINE 15 MG: 30 INJECTION, SOLUTION INTRAMUSCULAR; INTRAVENOUS at 17:10

## 2024-07-16 NOTE — DISCHARGE INSTRUCTIONS
Your CT scan showed Small lung nodules. Based on current Fleischner Society 2017 Guidelines on incidental pulmonary nodule, no routine follow-up is needed if the patient is low risk. If the patient is high risk, optional follow-up chest CT at 12 months can be   considered.  Please follow up with your PCP in regards to these findings and follow up recommendations
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
## 2025-05-22 ENCOUNTER — APPOINTMENT (OUTPATIENT)
Dept: LAB | Facility: HOSPITAL | Age: 45
End: 2025-05-22
Payer: COMMERCIAL

## 2025-05-22 DIAGNOSIS — E11.9 TYPE 2 DIABETES MELLITUS WITHOUT COMPLICATION, WITH LONG-TERM CURRENT USE OF INSULIN (HCC): ICD-10-CM

## 2025-05-22 DIAGNOSIS — Z13.6 SCREENING FOR CARDIOVASCULAR CONDITION: ICD-10-CM

## 2025-05-22 DIAGNOSIS — R80.9 ALBUMINURIA: ICD-10-CM

## 2025-05-22 DIAGNOSIS — Z79.4 TYPE 2 DIABETES MELLITUS WITHOUT COMPLICATION, WITH LONG-TERM CURRENT USE OF INSULIN (HCC): ICD-10-CM

## 2025-05-22 DIAGNOSIS — Z00.00 PHYSICAL EXAM: ICD-10-CM

## 2025-05-22 LAB
ALBUMIN SERPL BCG-MCNC: 4.3 G/DL (ref 3.5–5)
ALP SERPL-CCNC: 71 U/L (ref 34–104)
ALT SERPL W P-5'-P-CCNC: 15 U/L (ref 7–52)
ANION GAP SERPL CALCULATED.3IONS-SCNC: 4 MMOL/L (ref 4–13)
AST SERPL W P-5'-P-CCNC: 13 U/L (ref 13–39)
BASOPHILS # BLD AUTO: 0.03 THOUSANDS/ÂΜL (ref 0–0.1)
BASOPHILS NFR BLD AUTO: 0 % (ref 0–1)
BILIRUB SERPL-MCNC: 0.46 MG/DL (ref 0.2–1)
BUN SERPL-MCNC: 9 MG/DL (ref 5–25)
CALCIUM SERPL-MCNC: 9 MG/DL (ref 8.4–10.2)
CHLORIDE SERPL-SCNC: 111 MMOL/L (ref 96–108)
CHOLEST SERPL-MCNC: 145 MG/DL (ref ?–200)
CO2 SERPL-SCNC: 26 MMOL/L (ref 21–32)
CREAT SERPL-MCNC: 0.92 MG/DL (ref 0.6–1.3)
CREAT UR-MCNC: 98.3 MG/DL
EOSINOPHIL # BLD AUTO: 0.18 THOUSAND/ÂΜL (ref 0–0.61)
EOSINOPHIL NFR BLD AUTO: 2 % (ref 0–6)
ERYTHROCYTE [DISTWIDTH] IN BLOOD BY AUTOMATED COUNT: 13.2 % (ref 11.6–15.1)
EST. AVERAGE GLUCOSE BLD GHB EST-MCNC: 103 MG/DL
GFR SERPL CREATININE-BSD FRML MDRD: 75 ML/MIN/1.73SQ M
GLUCOSE P FAST SERPL-MCNC: 95 MG/DL (ref 65–99)
HBA1C MFR BLD: 5.2 %
HCT VFR BLD AUTO: 42.6 % (ref 34.8–46.1)
HDLC SERPL-MCNC: 59 MG/DL
HGB BLD-MCNC: 13.8 G/DL (ref 11.5–15.4)
IMM GRANULOCYTES # BLD AUTO: 0.01 THOUSAND/UL (ref 0–0.2)
IMM GRANULOCYTES NFR BLD AUTO: 0 % (ref 0–2)
LDLC SERPL CALC-MCNC: 68 MG/DL (ref 0–100)
LYMPHOCYTES # BLD AUTO: 2.23 THOUSANDS/ÂΜL (ref 0.6–4.47)
LYMPHOCYTES NFR BLD AUTO: 30 % (ref 14–44)
MCH RBC QN AUTO: 29.6 PG (ref 26.8–34.3)
MCHC RBC AUTO-ENTMCNC: 32.4 G/DL (ref 31.4–37.4)
MCV RBC AUTO: 91 FL (ref 82–98)
MICROALBUMIN UR-MCNC: <7 MG/L
MONOCYTES # BLD AUTO: 0.65 THOUSAND/ÂΜL (ref 0.17–1.22)
MONOCYTES NFR BLD AUTO: 9 % (ref 4–12)
NEUTROPHILS # BLD AUTO: 4.47 THOUSANDS/ÂΜL (ref 1.85–7.62)
NEUTS SEG NFR BLD AUTO: 59 % (ref 43–75)
NONHDLC SERPL-MCNC: 86 MG/DL
NRBC BLD AUTO-RTO: 0 /100 WBCS
PLATELET # BLD AUTO: 259 THOUSANDS/UL (ref 149–390)
PMV BLD AUTO: 10.3 FL (ref 8.9–12.7)
POTASSIUM SERPL-SCNC: 4 MMOL/L (ref 3.5–5.3)
PROT SERPL-MCNC: 6.5 G/DL (ref 6.4–8.4)
RBC # BLD AUTO: 4.66 MILLION/UL (ref 3.81–5.12)
SODIUM SERPL-SCNC: 141 MMOL/L (ref 135–147)
TRIGL SERPL-MCNC: 89 MG/DL (ref ?–150)
TSH SERPL DL<=0.05 MIU/L-ACNC: 1.92 UIU/ML (ref 0.45–4.5)
WBC # BLD AUTO: 7.57 THOUSAND/UL (ref 4.31–10.16)

## 2025-05-22 PROCEDURE — 83036 HEMOGLOBIN GLYCOSYLATED A1C: CPT

## 2025-05-22 PROCEDURE — 85025 COMPLETE CBC W/AUTO DIFF WBC: CPT

## 2025-05-22 PROCEDURE — 80053 COMPREHEN METABOLIC PANEL: CPT

## 2025-05-22 PROCEDURE — 82043 UR ALBUMIN QUANTITATIVE: CPT

## 2025-05-22 PROCEDURE — 84443 ASSAY THYROID STIM HORMONE: CPT

## 2025-05-22 PROCEDURE — 80061 LIPID PANEL: CPT

## 2025-05-22 PROCEDURE — 36415 COLL VENOUS BLD VENIPUNCTURE: CPT

## 2025-05-22 PROCEDURE — 82570 ASSAY OF URINE CREATININE: CPT

## 2025-05-28 NOTE — PROGRESS NOTES
She gained more  weight between visits.  BMI 41.09.  Type 2 diabetes with morbid obesity associated with hyperlipiidemia and cataracts well-controlled and within goal.  Fasting glucose 129.  A1c 6.3 %.  Lipids within goal.  Normal urine microalbumin.  She gets nausea with Mounjaro.  She is considering giving it another trial after she returns from vacation.  I recommended that she eat healthier, lose weight and get 30 minutes of exercise 5 days a week.  I recommended a return appointment in 6 months and recheck fasting laboratory work again at that time.. .       She had an eye exam with mild cataracts and without retinopathy on 4/18/2025 by Dr. Yong Jaquez.   In the milieu all evening  Interacts with peers  Still complaining about having to take medications but she did comply with all scheduled medications